# Patient Record
Sex: FEMALE | Race: WHITE | NOT HISPANIC OR LATINO
[De-identification: names, ages, dates, MRNs, and addresses within clinical notes are randomized per-mention and may not be internally consistent; named-entity substitution may affect disease eponyms.]

---

## 2019-09-24 ENCOUNTER — APPOINTMENT (OUTPATIENT)
Dept: INTERNAL MEDICINE | Facility: CLINIC | Age: 34
End: 2019-09-24

## 2019-11-01 ENCOUNTER — INPATIENT (INPATIENT)
Facility: HOSPITAL | Age: 34
LOS: 5 days | Discharge: HOME HEALTH | DRG: 908 | End: 2019-11-07
Attending: COLON & RECTAL SURGERY | Admitting: COLON & RECTAL SURGERY
Payer: MEDICAID

## 2019-11-01 ENCOUNTER — APPOINTMENT (OUTPATIENT)
Dept: COLORECTAL SURGERY | Facility: CLINIC | Age: 34
End: 2019-11-01
Payer: MEDICAID

## 2019-11-01 ENCOUNTER — TRANSCRIPTION ENCOUNTER (OUTPATIENT)
Age: 34
End: 2019-11-01

## 2019-11-01 VITALS
HEIGHT: 63 IN | SYSTOLIC BLOOD PRESSURE: 152 MMHG | DIASTOLIC BLOOD PRESSURE: 98 MMHG | OXYGEN SATURATION: 98 % | TEMPERATURE: 98 F | WEIGHT: 266.1 LBS | HEART RATE: 93 BPM | RESPIRATION RATE: 16 BRPM

## 2019-11-01 VITALS
DIASTOLIC BLOOD PRESSURE: 85 MMHG | BODY MASS INDEX: 48.02 KG/M2 | WEIGHT: 271 LBS | TEMPERATURE: 98 F | HEIGHT: 63 IN | HEART RATE: 94 BPM | SYSTOLIC BLOOD PRESSURE: 130 MMHG

## 2019-11-01 DIAGNOSIS — Z98.890 OTHER SPECIFIED POSTPROCEDURAL STATES: Chronic | ICD-10-CM

## 2019-11-01 DIAGNOSIS — F17.210 NICOTINE DEPENDENCE, CIGARETTES, UNCOMPLICATED: ICD-10-CM

## 2019-11-01 PROCEDURE — 99285 EMERGENCY DEPT VISIT HI MDM: CPT

## 2019-11-01 PROCEDURE — 99205 OFFICE O/P NEW HI 60 MIN: CPT

## 2019-11-01 PROCEDURE — 71045 X-RAY EXAM CHEST 1 VIEW: CPT | Mod: 26

## 2019-11-01 PROCEDURE — 93010 ELECTROCARDIOGRAM REPORT: CPT

## 2019-11-01 RX ORDER — PIPERACILLIN AND TAZOBACTAM 4; .5 G/20ML; G/20ML
3.38 INJECTION, POWDER, LYOPHILIZED, FOR SOLUTION INTRAVENOUS EVERY 6 HOURS
Refills: 0 | Status: DISCONTINUED | OUTPATIENT
Start: 2019-11-01 | End: 2019-11-05

## 2019-11-01 RX ORDER — PIPERACILLIN AND TAZOBACTAM 4; .5 G/20ML; G/20ML
3.38 INJECTION, POWDER, LYOPHILIZED, FOR SOLUTION INTRAVENOUS ONCE
Refills: 0 | Status: COMPLETED | OUTPATIENT
Start: 2019-11-01 | End: 2019-11-01

## 2019-11-01 RX ORDER — IBUPROFEN 200 MG
600 TABLET ORAL EVERY 6 HOURS
Refills: 0 | Status: DISCONTINUED | OUTPATIENT
Start: 2019-11-01 | End: 2019-11-05

## 2019-11-01 RX ORDER — ACETAMINOPHEN 500 MG
650 TABLET ORAL EVERY 6 HOURS
Refills: 0 | Status: DISCONTINUED | OUTPATIENT
Start: 2019-11-01 | End: 2019-11-03

## 2019-11-01 RX ORDER — IBUPROFEN 200 MG
800 TABLET ORAL DAILY
Refills: 0 | Status: DISCONTINUED | OUTPATIENT
Start: 2019-11-01 | End: 2019-11-01

## 2019-11-01 RX ORDER — SODIUM CHLORIDE 9 MG/ML
1000 INJECTION INTRAMUSCULAR; INTRAVENOUS; SUBCUTANEOUS
Refills: 0 | Status: DISCONTINUED | OUTPATIENT
Start: 2019-11-01 | End: 2019-11-05

## 2019-11-01 RX ORDER — ONDANSETRON 8 MG/1
4 TABLET, FILM COATED ORAL EVERY 6 HOURS
Refills: 0 | Status: DISCONTINUED | OUTPATIENT
Start: 2019-11-01 | End: 2019-11-05

## 2019-11-01 RX ORDER — DIPHENHYDRAMINE HCL 50 MG
25 CAPSULE ORAL EVERY 6 HOURS
Refills: 0 | Status: DISCONTINUED | OUTPATIENT
Start: 2019-11-01 | End: 2019-11-05

## 2019-11-01 RX ORDER — HEPARIN SODIUM 5000 [USP'U]/ML
5000 INJECTION INTRAVENOUS; SUBCUTANEOUS EVERY 8 HOURS
Refills: 0 | Status: DISCONTINUED | OUTPATIENT
Start: 2019-11-01 | End: 2019-11-04

## 2019-11-01 RX ORDER — INFLUENZA VIRUS VACCINE 15; 15; 15; 15 UG/.5ML; UG/.5ML; UG/.5ML; UG/.5ML
0.5 SUSPENSION INTRAMUSCULAR ONCE
Refills: 0 | Status: DISCONTINUED | OUTPATIENT
Start: 2019-11-01 | End: 2019-11-07

## 2019-11-01 RX ORDER — ALPRAZOLAM 0.25 MG
0.5 TABLET ORAL
Refills: 0 | Status: DISCONTINUED | OUTPATIENT
Start: 2019-11-01 | End: 2019-11-05

## 2019-11-01 RX ADMIN — Medication 0.5 MILLIGRAM(S): at 21:25

## 2019-11-01 RX ADMIN — SODIUM CHLORIDE 160 MILLILITER(S): 9 INJECTION INTRAMUSCULAR; INTRAVENOUS; SUBCUTANEOUS at 21:07

## 2019-11-01 RX ADMIN — HEPARIN SODIUM 5000 UNIT(S): 5000 INJECTION INTRAVENOUS; SUBCUTANEOUS at 23:44

## 2019-11-01 RX ADMIN — PIPERACILLIN AND TAZOBACTAM 200 GRAM(S): 4; .5 INJECTION, POWDER, LYOPHILIZED, FOR SOLUTION INTRAVENOUS at 21:06

## 2019-11-01 RX ADMIN — Medication 600 MILLIGRAM(S): at 21:26

## 2019-11-01 NOTE — REASON FOR VISIT
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

## 2019-11-01 NOTE — H&P ADULT - NSHPPHYSICALEXAM_GEN_ALL_CORE
Physical Exam:  General: NAD  Pulmonary: Nonlabored breathing, no respiratory distress  Abdominal: Soft, NT/ND, obese, 5cm horizontal incision beneath pannus with feculent output and surrounding erythema/edema  Neuro: AAO x3, no focal deficits

## 2019-11-01 NOTE — PROGRESS NOTE ADULT - SUBJECTIVE AND OBJECTIVE BOX
See my AllScripts full note from this afternoon.  32 yo woman with complex pelvic cyst that was removed by GYN surgeon in June 2019. Developed Pffansnstiehl infection and then colocutaneous fistula (rectosigmoid colon) about 6 weeks after surgery. Patient is morbidly obese.  Has has a pelvic and lower abdominal phlegmon and complex abscess all summer.  IR drainage attempts have failed.  Fistulograms and gastrograffin trans anal studies and CT scans have documented comlex abscess and ? connection to bladder.  No SB or other colonic fistulas noted.  Minimal fistula output, as per pateint, until last few days when stool was noted coming via opening.  In office today, large amount of semiformed stool drained.  No UTI recently but had one initially postop. Pneumaturia x 1-2 times only over last months.    PMH:  smoker  obese    Meds  Ibuprofen  (no antibiotics x 3 weeks    PSH  Above GYN surgery (no ESTEPHANIA or BSO done to our knowledge.  Op report not available.  Path as per patient was benign. )    Afeb, VSS in office  Vital Signs Last 24 Hrs  T(C): 36.7 (01 Nov 2019 16:53), Max: 36.7 (01 Nov 2019 16:53)  T(F): 98.1 (01 Nov 2019 16:53), Max: 98.1 (01 Nov 2019 16:53)  HR: 93 (01 Nov 2019 16:53) (93 - 93)  BP: 152/98 (01 Nov 2019 16:53) (152/98 - 152/98)  BP(mean): --  RR: 16 (01 Nov 2019 16:53) (16 - 16)  SpO2: 98% (01 Nov 2019 16:53) (98% - 98%)    lungs clear  cor  S1S2  abd: obese, non tender, large pannus.  Fistula and open transverse wound at base of pannus close to pubic symphisis  recta: not performed  ext: distal pulses intact                          12.0   16.50 )-----------( 265      ( 01 Nov 2019 17:24 )             38.7   11-01    138  |  102  |  17  ----------------------------<  98  SEE NOTE   |  24  |  0.61    Ca    9.2      01 Nov 2019 17:23    TPro  8.8<H>  /  Alb  3.9  /  TBili  0.2  /  DBili  x   /  AST  SEE NOTE  /  ALT  SEE NOTE  /  AlkPhos  90  11-01

## 2019-11-01 NOTE — PHYSICAL EXAM
[Exam Deferred] : exam was deferred [Normal Breath Sounds] : Normal breath sounds [Normal Heart Sounds] : normal heart sounds [No Rash or Lesion] : No rash or lesion [Alert] : alert [Oriented to Person] : oriented to person [Oriented to Place] : oriented to place [Oriented to Time] : oriented to time [Depressed] : depressed [Stool Sample Taken] : no stool obtained on rectal exam [JVD] : no jugular venous distention  [Thyroid] : the thyroid was abnormal [Carotid Bruits] : no carotid bruits [de-identified] : low pffanenstiehl incision that is open x 10 cm.  Stool exiting from the right side of it.. Very obese. non tender upper abdomen from above the suprapubic area.   [de-identified] : NAD

## 2019-11-01 NOTE — PROGRESS NOTE ADULT - ASSESSMENT
A/P  Complex pelvic phelgmon and colocutaneous fistula with increasing stool output via wound.  Last CT (has had 6-7 over summer) was in early October and showed phlegmon.  Abscessograms and enemas show complex infection and ? bladder connection (must be small or intermittent as she has no bladder sx's presenlty).  I feel that a slow cleanout over next 2-3 days with patient on clear liquids and IV hydration and antibiotcis followed by limited upper abdominal laparotomy and formation of loop tranverse colostomy is the best course of action.    After long period, then she will need to have definitive pelvic operation and takedown of fistula.  This should not be attempted now, in my opinion.   Admit, IV hydration  Golytely, 1 quart tomorrow, another Sunday and Monday  I/O's

## 2019-11-01 NOTE — ED ADULT NURSE NOTE - NSIMPLEMENTINTERV_GEN_ALL_ED
Implemented All Universal Safety Interventions:  [place] to call system. Call bell, personal items and telephone within reach. Instruct patient to call for assistance. Room bathroom lighting operational. Non-slip footwear when patient is off stretcher. Physically safe environment: no spills, clutter or unnecessary equipment. Stretcher in lowest position, wheels locked, appropriate side rails in place.

## 2019-11-01 NOTE — ED ADULT TRIAGE NOTE - CHIEF COMPLAINT QUOTE
pt sent by MD Saeed for colostomy on tuesday; pt with Hx of incisional dehiscence and fistula s/p ovarian cyst removal; denies f/c, abd pain at this time

## 2019-11-01 NOTE — H&P ADULT - NSHPLABSRESULTS_GEN_ALL_CORE
Vital Signs:  Vital Signs Last 24 Hrs  T(C): 36.7 (01 Nov 2019 16:53), Max: 36.7 (01 Nov 2019 16:53)  T(F): 98.1 (01 Nov 2019 16:53), Max: 98.1 (01 Nov 2019 16:53)  HR: 93 (01 Nov 2019 16:53) (93 - 93)  BP: 152/98 (01 Nov 2019 16:53) (152/98 - 152/98)  BP(mean): --  RR: 16 (01 Nov 2019 16:53) (16 - 16)  SpO2: 98% (01 Nov 2019 16:53) (98% - 98%)      Labs:                        12.0   16.50 )-----------( 265      ( 01 Nov 2019 17:24 )             38.7     11-01    138  |  102  |  17  ----------------------------<  98  SEE NOTE   |  24  |  0.61    Ca    9.2      01 Nov 2019 17:23    TPro  8.8<H>  /  Alb  3.9  /  TBili  0.2  /  DBili  x   /  AST  SEE NOTE  /  ALT  SEE NOTE  /  AlkPhos  90  11-01    PT/INR - ( 01 Nov 2019 17:24 )   PT: 13.0 sec;   INR: 1.15          PTT - ( 01 Nov 2019 17:24 )  PTT:34.1 sec    CAPILLARY BLOOD GLUCOSE        LIVER FUNCTIONS - ( 01 Nov 2019 17:23 )  Alb: 3.9 g/dL / Pro: 8.8 g/dL / ALK PHOS: 90 U/L / ALT: SEE NOTE U/L / AST: SEE NOTE U/L / GGT: x

## 2019-11-01 NOTE — ED PROVIDER NOTE - OBJECTIVE STATEMENT
32 yo F, PMH of sciatica, s/p multiple left ovarian cystectomy surgery by obgyn in June 2019 sec to complex cysts complicated by wound dehiscence and drainage with infection and abx at home via PICC line and frequent ED visits and hospitalizations since then with fistulogram done approx 3 weeks ago which showed colon and abd wall fistula presents from Dr. Saeed's office (colorectal surgery) for pre-op and admission to Dr. Saeed for surgery and colostomy placement. Pt admits to purulent dc from abd incision site- today at doctor's office she had fecal matter and food particles from her surgical site. No fevers, chills, abd pain. urinary sxs. No complaints.

## 2019-11-01 NOTE — ED PROVIDER NOTE - CLINICAL SUMMARY MEDICAL DECISION MAKING FREE TEXT BOX
34 yo F, PMH of sciatica, s/p multiple left ovarian cystectomy surgery by obgyn in June 2019 sec to complex cysts complicated by wound dehiscence and drainage with infection and abx at home via PICC line and frequent ED visits and hospitalizations since then with fistulogram done approx 3 weeks ago which showed colon and abd wall fistula presents from Dr. Saeed's office (colorectal surgery) for pre-op and admission to Dr. Saeed for surgery and colostomy placement. Pt admits to purulent dc from abd incision site- today at doctor's office she had fecal matter and food particles from her surgical site. No fevers, chills, abd pain. urinary sxs. No complaints.

## 2019-11-01 NOTE — ED ADULT NURSE NOTE - CHPI ED NUR SYMPTOMS NEG
no dizziness/no fever/no vomiting/no tingling/no weakness/no nausea/no decreased eating/drinking
none

## 2019-11-01 NOTE — ASSESSMENT
[FreeTextEntry1] : Morbidly obese young woman with colocutaneous fistula.\par Early October 2019 CT scan seen. Has complex phlegmon in pelvis and colocutaneous fistula after having a complex L adnexal cyst excised via a low transverse incision (no ESTEPHANIA done).  Multipe IR attempts to place drain - all were unsucessful.  Today, in office, for the first time it started putting out actual stool.  She has been off antibiotics x 3 weeks after having been on and off them all summer.  No fever with stable VS today.  Pathology from GYN op (original op) was peritoneal cyst and ovarian cyst.  No malignancy.  \par Benign middle and upper abdominal exam. \par Advised admission emergently and then IV hydration and slow bowel cleanout and then transverse colostomy via open surgery.  Clears only. \par To ER\par Residents aware of patient. \par NPO minus sips water. \par IV antbioitcs.  \par Long term plan would be to wait at least 6 months after diversion and then consider laparotomy and takedown of colocutaneous fistula.

## 2019-11-01 NOTE — HISTORY OF PRESENT ILLNESS
[FreeTextEntry1] : 33 year old female who developed low pelvic pain in February 2019,  she had pain and fever then. . . Trans vaginal sono found large comple  left adenexal mass. sono identified a large septated adenexal mass.  CT scan was done in March which questions a  left tubo ovarian abscess with a questioned closed loop sbo. (she had no  nausea or vomitng and was having bowel movements.) She was hospitalized and treated with IV antibiotics. IR drainage was attempted april 1st, drain was left in, but didn't drain.  she remained on antibiotics,  Repeat  CT 4/16/19 shows abscess minimally smaller with multiple  interloop abscesses, CT on 4/17 questioned multiple  abscesses with probable fistula to  sigmoid colon . Throughout all has been having bowel movements. was kept on antibiotics from February thru October.  In June they went in and removed  pelvi cyst. path showed benign peritoneal and ovarian cyst.  After op, she  did well for a while , but after 6 weeks she developed what she thought was an abscess and spontaneously drained.  she returned weekly for wound checks.  After a month she returned to the OR for a wound debridement.  Discharged with [facility]. After third dressing change she started draining  pus, and was put on antibiotics.  wound cleaned up and was discharged on dressing changes. Shortly thereafter she  developed a fistula.  Went to IR, was ultimately discharged on antibiotics again. 10/4/19  CT identified multiple abscesses, "Absessogram" finds large complex fistulous network. does not pass stool, but doesn't pass food depending on what she eats.  Is still having normal bms' once or twice a day.   Was to hav esurgery after most recent ED admission but discovered the general surgeon doesn't take her insurance. \par \par Christophe note: 35 yo woman with hx of 9.1 cm complex left adnexal pelvic mass/cyst (imaged 2/8/19, 3/21/19).  The had ? tuboovarian abscess (A/F levels) noted on 3/31/19.  IR did percutaneous drainage 4/1/19 of L hemipelvic collection.  N 4/16 had 11 x 7.9 x 9,7 cm abscess [fatty liver also noted] and interloop abscesses.  Abscess got bigger still.  Had open surgery in June by GYN team and had the abscess and cyst removed.  Initial post op course ok.  Developed wound infection weeks out.  Then open wound. Then wound debridement and then colocutaneous fistula.  Had colonic imaging that showed rectosigmoid fistula.  In and out of hospital.  Low output fistula.  Has persistent abdominal abscesses on f/u CT scans. Been mostly on antibiotics all summer.  CT abscessogram was done 10/7/19 and showed no fistula to SB or colon. Rectal contrast stiudy showed a complex fistulous communication between rectosigmoid colon (probably) and abdominal wall and also linear tracts extending to small collection in the pelvis.and ? communication to the bladder.  Attempts to cannulate the fistula from the skin level were not successful.\par Sees ID MD for infections.  Had IR attempt (?) drainage.  No drains currently. \par Has 1-2 BM’s per day.\par \par

## 2019-11-01 NOTE — H&P ADULT - HISTORY OF PRESENT ILLNESS
33F PMH possible L tuboovarian abscess s/p IR drain 04/01/2019 c/b multiple abscesses and sigmoid colon fistula treated with chronic antibiotics s/p ovarian cyst removal 06/2019 c/b abscess s/p operative debridement c/b multiple abscesses and fistulae presents with colocutaneous fistula. She had a 9.1cm complex L adnexal pelvic mass/cyst (imaged 2/8/2019 and 3/21/2019). She had a questionable tuboovarian abscess noted on 3/31/2019. IR performed a percutaneous drainage of the left hemipelvic collection on 4/1/2019. This was complicated by an abscess that grew.  In 06/2019, gynecology performed an ovarian cystectomy and abscess drainage. This was complicated by a wound infection about 6 weeks postoperatively, that was debrided and then complicated by a colocutaneous fistula. The rectosigmoid fistula is low output with persistent abdominal abscesses on CT imaging. A CT abscessogram on 10/7/2019 showed no fistula between the small bowel or colon, but rectal contrast study showed a complex fistulous communication between the rectosigmoid colon and abdominal wall with tracking to a small collection in the pelvis and possible bladder. Attempts to cannulate the fistula from the skin level were not successful. The patient currently does not have any drains and is having 1-2 bowel movements per day. 33F PMH possible L tuboovarian abscess s/p IR drain 04/01/2019 c/b multiple abscesses and sigmoid colon fistula treated with chronic antibiotics s/p ovarian cyst removal 06/2019 c/b abscess s/p operative debridement c/b multiple abscesses and fistulae presents with colocutaneous fistula. She had a 9.1cm complex L adnexal pelvic mass/cyst (imaged 2/8/2019 and 3/21/2019). She had a questionable tuboovarian abscess noted on 3/31/2019. IR performed a percutaneous drainage of the left hemipelvic collection on 4/1/2019. This was complicated by an abscess that grew.  In 06/2019, gynecology performed an ovarian cystectomy and abscess drainage. This was complicated by a wound infection about 6 weeks postoperatively, that was debrided and then complicated by a colocutaneous fistula. The rectosigmoid fistula is low output with persistent abdominal abscesses on CT imaging. A CT abscessogram on 10/7/2019 showed no fistula between the small bowel or colon, but rectal contrast study showed a complex fistulous communication between the rectosigmoid colon and abdominal wall with tracking to a small collection in the pelvis and possible bladder. Attempts to cannulate the fistula from the skin level were not successful. The patient currently does not have any drains. She has been able to tolerate PO and is having 1-2 bowel movements per day without nausea/vomiting. Her wound drainage has been purulent, but was feculent today. She endorses urinary frequency, but denies other LUTS.

## 2019-11-01 NOTE — ED ADULT NURSE NOTE - OBJECTIVE STATEMENT
33 year old F patient, A+OX3, ambulatory w steady gait, presents to ED with c/o of "my surgeon sent me in for a colostomy due to my fistula".  Pt had ovarian cyst removal surgery in June and has been having complications since.  No distress noted.  Breathing easily and unlabored, denies chest pain.  Has been having BM with no difficulty.  C/o of mild abd pain.

## 2019-11-01 NOTE — H&P ADULT - ASSESSMENT
A/P: 33F PMH possible L tuboovarian abscess s/p IR drain 04/01/2019 c/b multiple abscesses and sigmoid colon fistula treated with chronic antibiotics s/p ovarian cyst removal 06/2019 c/b abscess s/p operative debridement c/b multiple abscesses and fistulae presents with colocutaneous fistula    - Admit to regional bed under Dr. Saeed  - Pain/nausea PRN  - Sips/chips and IVF  - Zosyn  - OOBA/SCDs/SQH  - Tentative plan for diverting colostomy 11/05/2019  - AM labs  - Case discussed with chief resident

## 2019-11-02 LAB
ANION GAP SERPL CALC-SCNC: 9 MMOL/L — SIGNIFICANT CHANGE UP (ref 5–17)
BUN SERPL-MCNC: 12 MG/DL — SIGNIFICANT CHANGE UP (ref 7–23)
CALCIUM SERPL-MCNC: 8.7 MG/DL — SIGNIFICANT CHANGE UP (ref 8.4–10.5)
CHLORIDE SERPL-SCNC: 103 MMOL/L — SIGNIFICANT CHANGE UP (ref 96–108)
CO2 SERPL-SCNC: 27 MMOL/L — SIGNIFICANT CHANGE UP (ref 22–31)
CREAT SERPL-MCNC: 0.69 MG/DL — SIGNIFICANT CHANGE UP (ref 0.5–1.3)
GLUCOSE SERPL-MCNC: 102 MG/DL — HIGH (ref 70–99)
HCT VFR BLD CALC: 36.5 % — SIGNIFICANT CHANGE UP (ref 34.5–45)
HGB BLD-MCNC: 10.7 G/DL — LOW (ref 11.5–15.5)
MAGNESIUM SERPL-MCNC: 1.8 MG/DL — SIGNIFICANT CHANGE UP (ref 1.6–2.6)
MCHC RBC-ENTMCNC: 23.5 PG — LOW (ref 27–34)
MCHC RBC-ENTMCNC: 29.3 GM/DL — LOW (ref 32–36)
MCV RBC AUTO: 80 FL — SIGNIFICANT CHANGE UP (ref 80–100)
NRBC # BLD: 0 /100 WBCS — SIGNIFICANT CHANGE UP (ref 0–0)
PHOSPHATE SERPL-MCNC: 4.3 MG/DL — SIGNIFICANT CHANGE UP (ref 2.5–4.5)
PLATELET # BLD AUTO: 230 K/UL — SIGNIFICANT CHANGE UP (ref 150–400)
POTASSIUM SERPL-MCNC: 4 MMOL/L — SIGNIFICANT CHANGE UP (ref 3.5–5.3)
POTASSIUM SERPL-SCNC: 4 MMOL/L — SIGNIFICANT CHANGE UP (ref 3.5–5.3)
RBC # BLD: 4.56 M/UL — SIGNIFICANT CHANGE UP (ref 3.8–5.2)
RBC # FLD: 15.7 % — HIGH (ref 10.3–14.5)
SODIUM SERPL-SCNC: 139 MMOL/L — SIGNIFICANT CHANGE UP (ref 135–145)
WBC # BLD: 7.94 K/UL — SIGNIFICANT CHANGE UP (ref 3.8–10.5)
WBC # FLD AUTO: 7.94 K/UL — SIGNIFICANT CHANGE UP (ref 3.8–10.5)

## 2019-11-02 PROCEDURE — 99024 POSTOP FOLLOW-UP VISIT: CPT

## 2019-11-02 RX ORDER — HYDROMORPHONE HYDROCHLORIDE 2 MG/ML
0.5 INJECTION INTRAMUSCULAR; INTRAVENOUS; SUBCUTANEOUS ONCE
Refills: 0 | Status: DISCONTINUED | OUTPATIENT
Start: 2019-11-02 | End: 2019-11-02

## 2019-11-02 RX ORDER — SOD SULF/SODIUM/NAHCO3/KCL/PEG
4000 SOLUTION, RECONSTITUTED, ORAL ORAL ONCE
Refills: 0 | Status: DISCONTINUED | OUTPATIENT
Start: 2019-11-02 | End: 2019-11-02

## 2019-11-02 RX ORDER — SOD SULF/SODIUM/NAHCO3/KCL/PEG
2000 SOLUTION, RECONSTITUTED, ORAL ORAL ONCE
Refills: 0 | Status: COMPLETED | OUTPATIENT
Start: 2019-11-03 | End: 2019-11-03

## 2019-11-02 RX ORDER — SOD SULF/SODIUM/NAHCO3/KCL/PEG
2000 SOLUTION, RECONSTITUTED, ORAL ORAL ONCE
Refills: 0 | Status: COMPLETED | OUTPATIENT
Start: 2019-11-02 | End: 2019-11-02

## 2019-11-02 RX ORDER — MAGNESIUM SULFATE 500 MG/ML
2 VIAL (ML) INJECTION ONCE
Refills: 0 | Status: COMPLETED | OUTPATIENT
Start: 2019-11-02 | End: 2019-11-02

## 2019-11-02 RX ADMIN — PIPERACILLIN AND TAZOBACTAM 200 GRAM(S): 4; .5 INJECTION, POWDER, LYOPHILIZED, FOR SOLUTION INTRAVENOUS at 13:44

## 2019-11-02 RX ADMIN — HYDROMORPHONE HYDROCHLORIDE 0.5 MILLIGRAM(S): 2 INJECTION INTRAMUSCULAR; INTRAVENOUS; SUBCUTANEOUS at 16:26

## 2019-11-02 RX ADMIN — Medication 0.5 MILLIGRAM(S): at 22:16

## 2019-11-02 RX ADMIN — HEPARIN SODIUM 5000 UNIT(S): 5000 INJECTION INTRAVENOUS; SUBCUTANEOUS at 13:43

## 2019-11-02 RX ADMIN — PIPERACILLIN AND TAZOBACTAM 200 GRAM(S): 4; .5 INJECTION, POWDER, LYOPHILIZED, FOR SOLUTION INTRAVENOUS at 08:30

## 2019-11-02 RX ADMIN — Medication 600 MILLIGRAM(S): at 15:29

## 2019-11-02 RX ADMIN — Medication 2000 MILLILITER(S): at 16:16

## 2019-11-02 RX ADMIN — PIPERACILLIN AND TAZOBACTAM 200 GRAM(S): 4; .5 INJECTION, POWDER, LYOPHILIZED, FOR SOLUTION INTRAVENOUS at 20:33

## 2019-11-02 RX ADMIN — Medication 0.5 MILLIGRAM(S): at 09:26

## 2019-11-02 RX ADMIN — Medication 50 GRAM(S): at 13:43

## 2019-11-02 RX ADMIN — HEPARIN SODIUM 5000 UNIT(S): 5000 INJECTION INTRAVENOUS; SUBCUTANEOUS at 06:51

## 2019-11-02 RX ADMIN — PIPERACILLIN AND TAZOBACTAM 200 GRAM(S): 4; .5 INJECTION, POWDER, LYOPHILIZED, FOR SOLUTION INTRAVENOUS at 02:33

## 2019-11-02 NOTE — PROGRESS NOTE ADULT - ASSESSMENT
33F PMH possible L tuboovarian abscess s/p IR drain 04/01/2019 c/b multiple abscesses and sigmoid colon fistula treated with chronic antibiotics s/p ovarian cyst removal 06/2019 c/b abscess s/p operative debridement c/b multiple abscesses and fistulae presents with colocutaneous fistula    - Pain/nausea PRN  - Sips/chips and IVF  - Zosyn  - Consult ostomy nurses for stoma markings  - BID dressing changes  - OOBA/SCDs/SQH  - Tentative plan for diverting colostomy 11/05/2019  - AM labs

## 2019-11-02 NOTE — PROGRESS NOTE ADULT - SUBJECTIVE AND OBJECTIVE BOX
HD 2  Patient admitted last PM.  On IV zosyn.  Been on water p/o only.  Has continued to pass some stool via the fistula (open Pffanenstiehl incision)  No abdominal pain but has sciatica related pain for which she takes ibuprofen  Has had a BM this AM. No N/V.    Ambulating in room.  voiding well and frequently.  NO pneumaturia.  Has passed some gas via the fistula.      Vital Signs Last 24 Hrs  T(C): 36.4 (02 Nov 2019 12:29), Max: 36.8 (01 Nov 2019 21:30)  T(F): 97.6 (02 Nov 2019 12:29), Max: 98.3 (01 Nov 2019 21:30)  HR: 68 (02 Nov 2019 12:29) (58 - 93)  BP: 119/69 (02 Nov 2019 12:29) (106/69 - 152/98)  BP(mean): --  RR: 17 (02 Nov 2019 12:29) (16 - 18)  SpO2: 99% (02 Nov 2019 12:29) (96% - 99%)    exam:  abd: obese, non tender, open wound as before, some stool on dressing which is being changed frequently  ext: without calf tenderness    UO adequate                          10.7   7.94  )-----------( 230      ( 02 Nov 2019 06:54 )             36.5   11-02    139  |  103  |  12  ----------------------------<  102<H>  4.0   |  27  |  0.69    Ca    8.7      02 Nov 2019 06:54  Phos  4.3     11-02  Mg     1.8     11-02    TPro  8.8<H>  /  Alb  3.9  /  TBili  0.2  /  DBili  x   /  AST  SEE NOTE  /  ALT  SEE NOTE  /  AlkPhos  90  11-01

## 2019-11-02 NOTE — PROGRESS NOTE ADULT - ASSESSMENT
A/P  Has colocutaneous fistula with increasing amounts of stool draining via fistula.  Also having BM's.  Most of stool exiting via anus, thankfully.  WBC down and remains afebrile.  Holding on repeat imaging since I dont think it will change plan and she is doing ok overall minus the stool drainage and open wound.  Will give 1 quart golytely po today and allow clear liquids.  Second quart prep tomorrow, 2 quarts on Monday.  Surgery (ex lap, limited, and formation of loop stoma) planned for Tuesday afternoon.    Urged to ambulate more in hallway.  Follow exam.

## 2019-11-02 NOTE — PROGRESS NOTE ADULT - SUBJECTIVE AND OBJECTIVE BOX
INTERVAL HPI/OVERNIGHT EVENTS: admitted for management of colocutaneous fistula    SUBJECTIVE:  pt seen at bedside, without complaints at this time. wound clean    MEDICATIONS  (STANDING):  heparin  Injectable 5000 Unit(s) SubCutaneous every 8 hours  influenza   Vaccine 0.5 milliLiter(s) IntraMuscular once  piperacillin/tazobactam IVPB.. 3.375 Gram(s) IV Intermittent every 6 hours  sodium chloride 0.9%. 1000 milliLiter(s) (160 mL/Hr) IV Continuous <Continuous>    MEDICATIONS  (PRN):  acetaminophen   Tablet .. 650 milliGRAM(s) Oral every 6 hours PRN Severe Pain (7 - 10)  ALPRAZolam 0.5 milliGRAM(s) Oral two times a day PRN Anxiety  diphenhydrAMINE   Injectable 25 milliGRAM(s) IV Push every 6 hours PRN Rash and/or Itching  ibuprofen  Tablet. 600 milliGRAM(s) Oral every 6 hours PRN Mild Pain (1 - 3)  ondansetron Injectable 4 milliGRAM(s) IV Push every 6 hours PRN Nausea      Vital Signs Last 24 Hrs  T(C): 36.4 (02 Nov 2019 05:42), Max: 36.8 (01 Nov 2019 21:30)  T(F): 97.5 (02 Nov 2019 05:42), Max: 98.3 (01 Nov 2019 21:30)  HR: 58 (02 Nov 2019 05:42) (58 - 93)  BP: 109/67 (02 Nov 2019 05:42) (109/67 - 152/98)  BP(mean): --  RR: 18 (02 Nov 2019 05:42) (16 - 18)  SpO2: 96% (02 Nov 2019 05:42) (96% - 98%)    PHYSICAL EXAM:      Constitutional: A&Ox3    Respiratory: non labored breathing, no respiratory distress    Cardiovascular: NSR, RRR    Gastrointestinal: soft, nontender, nondistended, wound at suprapubic area clean, without erythema or active discharge    Extremities: (-) edema                  I&O's Detail    01 Nov 2019 07:01  -  02 Nov 2019 07:00  --------------------------------------------------------  IN:    sodium chloride 0.9%.: 1120 mL    Solution: 100 mL  Total IN: 1220 mL    OUT:    Voided: 200 mL  Total OUT: 200 mL    Total NET: 1020 mL          LABS:                        10.7   7.94  )-----------( 230      ( 02 Nov 2019 06:54 )             36.5     11-02    139  |  103  |  12  ----------------------------<  102<H>  4.0   |  27  |  0.69    Ca    8.7      02 Nov 2019 06:54  Phos  4.3     11-02  Mg     1.8     11-02    TPro  8.8<H>  /  Alb  3.9  /  TBili  0.2  /  DBili  x   /  AST  SEE NOTE  /  ALT  SEE NOTE  /  AlkPhos  90  11-01    PT/INR - ( 01 Nov 2019 17:24 )   PT: 13.0 sec;   INR: 1.15          PTT - ( 01 Nov 2019 17:24 )  PTT:34.1 sec      RADIOLOGY & ADDITIONAL STUDIES:

## 2019-11-03 LAB
ANION GAP SERPL CALC-SCNC: 10 MMOL/L — SIGNIFICANT CHANGE UP (ref 5–17)
BUN SERPL-MCNC: 4 MG/DL — LOW (ref 7–23)
CALCIUM SERPL-MCNC: 8.5 MG/DL — SIGNIFICANT CHANGE UP (ref 8.4–10.5)
CHLORIDE SERPL-SCNC: 106 MMOL/L — SIGNIFICANT CHANGE UP (ref 96–108)
CO2 SERPL-SCNC: 23 MMOL/L — SIGNIFICANT CHANGE UP (ref 22–31)
CREAT SERPL-MCNC: 0.57 MG/DL — SIGNIFICANT CHANGE UP (ref 0.5–1.3)
GLUCOSE SERPL-MCNC: 111 MG/DL — HIGH (ref 70–99)
HCT VFR BLD CALC: 32.7 % — LOW (ref 34.5–45)
HGB BLD-MCNC: 9.9 G/DL — LOW (ref 11.5–15.5)
MAGNESIUM SERPL-MCNC: 2 MG/DL — SIGNIFICANT CHANGE UP (ref 1.6–2.6)
MCHC RBC-ENTMCNC: 23.9 PG — LOW (ref 27–34)
MCHC RBC-ENTMCNC: 30.3 GM/DL — LOW (ref 32–36)
MCV RBC AUTO: 78.8 FL — LOW (ref 80–100)
NRBC # BLD: 0 /100 WBCS — SIGNIFICANT CHANGE UP (ref 0–0)
PHOSPHATE SERPL-MCNC: 3.1 MG/DL — SIGNIFICANT CHANGE UP (ref 2.5–4.5)
PLATELET # BLD AUTO: 196 K/UL — SIGNIFICANT CHANGE UP (ref 150–400)
POTASSIUM SERPL-MCNC: 3.6 MMOL/L — SIGNIFICANT CHANGE UP (ref 3.5–5.3)
POTASSIUM SERPL-SCNC: 3.6 MMOL/L — SIGNIFICANT CHANGE UP (ref 3.5–5.3)
RBC # BLD: 4.15 M/UL — SIGNIFICANT CHANGE UP (ref 3.8–5.2)
RBC # FLD: 15.6 % — HIGH (ref 10.3–14.5)
SODIUM SERPL-SCNC: 139 MMOL/L — SIGNIFICANT CHANGE UP (ref 135–145)
WBC # BLD: 5.98 K/UL — SIGNIFICANT CHANGE UP (ref 3.8–10.5)
WBC # FLD AUTO: 5.98 K/UL — SIGNIFICANT CHANGE UP (ref 3.8–10.5)

## 2019-11-03 PROCEDURE — 99024 POSTOP FOLLOW-UP VISIT: CPT

## 2019-11-03 RX ORDER — HYDROMORPHONE HYDROCHLORIDE 2 MG/ML
0.5 INJECTION INTRAMUSCULAR; INTRAVENOUS; SUBCUTANEOUS ONCE
Refills: 0 | Status: DISCONTINUED | OUTPATIENT
Start: 2019-11-03 | End: 2019-11-03

## 2019-11-03 RX ORDER — ACETAMINOPHEN 500 MG
650 TABLET ORAL EVERY 6 HOURS
Refills: 0 | Status: DISCONTINUED | OUTPATIENT
Start: 2019-11-03 | End: 2019-11-05

## 2019-11-03 RX ORDER — HYDROMORPHONE HYDROCHLORIDE 2 MG/ML
0.5 INJECTION INTRAMUSCULAR; INTRAVENOUS; SUBCUTANEOUS EVERY 6 HOURS
Refills: 0 | Status: DISCONTINUED | OUTPATIENT
Start: 2019-11-03 | End: 2019-11-05

## 2019-11-03 RX ORDER — HYDROMORPHONE HYDROCHLORIDE 2 MG/ML
1 INJECTION INTRAMUSCULAR; INTRAVENOUS; SUBCUTANEOUS ONCE
Refills: 0 | Status: DISCONTINUED | OUTPATIENT
Start: 2019-11-03 | End: 2019-11-03

## 2019-11-03 RX ORDER — POTASSIUM CHLORIDE 20 MEQ
40 PACKET (EA) ORAL ONCE
Refills: 0 | Status: COMPLETED | OUTPATIENT
Start: 2019-11-03 | End: 2019-11-03

## 2019-11-03 RX ADMIN — HYDROMORPHONE HYDROCHLORIDE 0.5 MILLIGRAM(S): 2 INJECTION INTRAMUSCULAR; INTRAVENOUS; SUBCUTANEOUS at 00:54

## 2019-11-03 RX ADMIN — HYDROMORPHONE HYDROCHLORIDE 0.5 MILLIGRAM(S): 2 INJECTION INTRAMUSCULAR; INTRAVENOUS; SUBCUTANEOUS at 09:37

## 2019-11-03 RX ADMIN — Medication 0.5 MILLIGRAM(S): at 07:07

## 2019-11-03 RX ADMIN — HEPARIN SODIUM 5000 UNIT(S): 5000 INJECTION INTRAVENOUS; SUBCUTANEOUS at 14:40

## 2019-11-03 RX ADMIN — HYDROMORPHONE HYDROCHLORIDE 0.5 MILLIGRAM(S): 2 INJECTION INTRAMUSCULAR; INTRAVENOUS; SUBCUTANEOUS at 00:24

## 2019-11-03 RX ADMIN — Medication 650 MILLIGRAM(S): at 17:53

## 2019-11-03 RX ADMIN — PIPERACILLIN AND TAZOBACTAM 200 GRAM(S): 4; .5 INJECTION, POWDER, LYOPHILIZED, FOR SOLUTION INTRAVENOUS at 01:02

## 2019-11-03 RX ADMIN — HYDROMORPHONE HYDROCHLORIDE 1 MILLIGRAM(S): 2 INJECTION INTRAMUSCULAR; INTRAVENOUS; SUBCUTANEOUS at 12:58

## 2019-11-03 RX ADMIN — Medication 40 MILLIEQUIVALENT(S): at 09:37

## 2019-11-03 RX ADMIN — Medication 650 MILLIGRAM(S): at 18:53

## 2019-11-03 RX ADMIN — HYDROMORPHONE HYDROCHLORIDE 1 MILLIGRAM(S): 2 INJECTION INTRAMUSCULAR; INTRAVENOUS; SUBCUTANEOUS at 12:43

## 2019-11-03 RX ADMIN — PIPERACILLIN AND TAZOBACTAM 200 GRAM(S): 4; .5 INJECTION, POWDER, LYOPHILIZED, FOR SOLUTION INTRAVENOUS at 14:40

## 2019-11-03 RX ADMIN — HYDROMORPHONE HYDROCHLORIDE 0.5 MILLIGRAM(S): 2 INJECTION INTRAMUSCULAR; INTRAVENOUS; SUBCUTANEOUS at 09:52

## 2019-11-03 RX ADMIN — PIPERACILLIN AND TAZOBACTAM 200 GRAM(S): 4; .5 INJECTION, POWDER, LYOPHILIZED, FOR SOLUTION INTRAVENOUS at 20:44

## 2019-11-03 RX ADMIN — PIPERACILLIN AND TAZOBACTAM 200 GRAM(S): 4; .5 INJECTION, POWDER, LYOPHILIZED, FOR SOLUTION INTRAVENOUS at 07:00

## 2019-11-03 RX ADMIN — Medication 2000 MILLILITER(S): at 09:38

## 2019-11-03 RX ADMIN — Medication 0.5 MILLIGRAM(S): at 20:43

## 2019-11-03 NOTE — PROGRESS NOTE ADULT - ASSESSMENT
33F PMH possible L tuboovarian abscess s/p IR drain 04/01/2019 c/b multiple abscesses and sigmoid colon fistula treated with chronic antibiotics s/p ovarian cyst removal 06/2019 c/b abscess s/p operative debridement c/b multiple abscesses and fistulae presents with colocutaneous fistula    - Pain/nausea PRN  - Sips/chips and IVF  - Zosyn  - Consult ostomy nurses for stoma markings  - BID dressing changes  - OOBA/SCDs/SQH  - Tentative plan for diverting colostomy 11/05/2019  - AM labs  - Continue with bowel prep for OR on 11/05

## 2019-11-03 NOTE — PROGRESS NOTE ADULT - SUBJECTIVE AND OBJECTIVE BOX
HD 3  Taking the bowel prep and having BM's via anus and via fistula.  Consistency of stool now is thinner.  She c/o pain in pelvis and fistula when she strains to have a BM (she feels need to strain).  For this pain the HO have been giving her dilaudid.  She came in on advil.  She is also on clears.  She is ambulating and voiding ok.    Vital Signs Last 24 Hrs  T(C): 36.9 (03 Nov 2019 13:02), Max: 37.1 (03 Nov 2019 09:00)  T(F): 98.4 (03 Nov 2019 13:02), Max: 98.7 (03 Nov 2019 09:00)  HR: 77 (03 Nov 2019 13:02) (68 - 77)  BP: 129/54 (03 Nov 2019 09:00) (115/78 - 131/77)  BP(mean): --  RR: 15 (03 Nov 2019 13:02) (15 - 18)  SpO2: 99% (03 Nov 2019 13:02) (97% - 99%)    abd: no change, no tenderness except near fistula beneath the panus.    ext: without calf tenderness                          9.9    5.98  )-----------( 196      ( 03 Nov 2019 07:22 )             32.7   11-03    139  |  106  |  4<L>  ----------------------------<  111<H>  3.6   |  23  |  0.57    Ca    8.5      03 Nov 2019 07:22  Phos  3.1     11-03  Mg     2.0     11-03    TPro  8.8<H>  /  Alb  3.9  /  TBili  0.2  /  DBili  x   /  AST  SEE NOTE  /  ALT  SEE NOTE  /  AlkPhos  90  11-01

## 2019-11-03 NOTE — PROGRESS NOTE ADULT - ASSESSMENT
A/P Doing ok.  Will give ibuprofen 600 mg q 6 hrs and then use 0.5 mg dialudid for break through pain.   Continue prep  Clear liquids  Ambulate  Watch K levels  Surgery planned for Tuesday late afternoon.

## 2019-11-03 NOTE — PROGRESS NOTE ADULT - SUBJECTIVE AND OBJECTIVE BOX
24 hr events:  O/N: pt drank 1/2 the golytely, dressing changed, AWA   11/2: dressing changed by patient, 1/2 golytely today and 1/2 tomorrow as per Dr. Saeed    SUBJECTIVE:  Pt seen and examined by chief resident. Pt is doing well. Clear liquid diet tolerated. Ambulating out of bed. Drinking prep. No nausea or vomiting. No complaints at this time.    Vital Signs Last 24 Hrs  T(C): 36.7 (03 Nov 2019 05:06), Max: 36.9 (02 Nov 2019 20:15)  T(F): 98 (03 Nov 2019 05:06), Max: 98.5 (02 Nov 2019 20:15)  HR: 70 (03 Nov 2019 05:06) (59 - 76)  BP: 115/78 (03 Nov 2019 05:06) (106/69 - 131/77)  RR: 17 (03 Nov 2019 05:06) (17 - 18)  SpO2: 99% (03 Nov 2019 05:06) (97% - 99%)    Physical Exam:  General: NAD  Pulmonary: Nonlabored breathing, no respiratory distress  Abdominal: soft, nontender, nondistended, wound at suprapubic with minimal active discharge  Extremities: warm, well perfused. SCDs in place.     I&O's Summary    02 Nov 2019 08:01  -  03 Nov 2019 07:00  --------------------------------------------------------  IN: 5820 mL / OUT: 2700 mL / NET: 3120 mL        LABS:                        9.9    5.98  )-----------( 196      ( 03 Nov 2019 07:22 )             32.7     11-02    139  |  103  |  12  ----------------------------<  102<H>  4.0   |  27  |  0.69    Ca    8.7      02 Nov 2019 06:54  Phos  4.3     11-02  Mg     1.8     11-02    TPro  8.8<H>  /  Alb  3.9  /  TBili  0.2  /  DBili  x   /  AST  SEE NOTE  /  ALT  SEE NOTE  /  AlkPhos  90  11-01  PT/INR - ( 01 Nov 2019 17:24 )   PT: 13.0 sec;   INR: 1.15     PTT - ( 01 Nov 2019 17:24 )  PTT:34.1 sec    LIVER FUNCTIONS - ( 01 Nov 2019 17:23 )  Alb: 3.9 g/dL / Pro: 8.8 g/dL / ALK PHOS: 90 U/L / ALT: SEE NOTE U/L / AST: SEE NOTE U/L / GGT: x

## 2019-11-04 PROBLEM — N83.209 UNSPECIFIED OVARIAN CYST, UNSPECIFIED SIDE: Chronic | Status: ACTIVE | Noted: 2019-11-01

## 2019-11-04 PROBLEM — N70.93 SALPINGITIS AND OOPHORITIS, UNSPECIFIED: Chronic | Status: ACTIVE | Noted: 2019-11-01

## 2019-11-04 LAB
ANION GAP SERPL CALC-SCNC: 10 MMOL/L — SIGNIFICANT CHANGE UP (ref 5–17)
BUN SERPL-MCNC: 2 MG/DL — LOW (ref 7–23)
CALCIUM SERPL-MCNC: 8.3 MG/DL — LOW (ref 8.4–10.5)
CHLORIDE SERPL-SCNC: 105 MMOL/L — SIGNIFICANT CHANGE UP (ref 96–108)
CO2 SERPL-SCNC: 25 MMOL/L — SIGNIFICANT CHANGE UP (ref 22–31)
CREAT SERPL-MCNC: 0.58 MG/DL — SIGNIFICANT CHANGE UP (ref 0.5–1.3)
GLUCOSE SERPL-MCNC: 88 MG/DL — SIGNIFICANT CHANGE UP (ref 70–99)
HCT VFR BLD CALC: 31.9 % — LOW (ref 34.5–45)
HGB BLD-MCNC: 9.4 G/DL — LOW (ref 11.5–15.5)
MAGNESIUM SERPL-MCNC: 1.9 MG/DL — SIGNIFICANT CHANGE UP (ref 1.6–2.6)
MCHC RBC-ENTMCNC: 23.5 PG — LOW (ref 27–34)
MCHC RBC-ENTMCNC: 29.5 GM/DL — LOW (ref 32–36)
MCV RBC AUTO: 79.8 FL — LOW (ref 80–100)
NRBC # BLD: 0 /100 WBCS — SIGNIFICANT CHANGE UP (ref 0–0)
PHOSPHATE SERPL-MCNC: 3.8 MG/DL — SIGNIFICANT CHANGE UP (ref 2.5–4.5)
PLATELET # BLD AUTO: 192 K/UL — SIGNIFICANT CHANGE UP (ref 150–400)
POTASSIUM SERPL-MCNC: 3.8 MMOL/L — SIGNIFICANT CHANGE UP (ref 3.5–5.3)
POTASSIUM SERPL-SCNC: 3.8 MMOL/L — SIGNIFICANT CHANGE UP (ref 3.5–5.3)
RBC # BLD: 4 M/UL — SIGNIFICANT CHANGE UP (ref 3.8–5.2)
RBC # FLD: 15.4 % — HIGH (ref 10.3–14.5)
SODIUM SERPL-SCNC: 140 MMOL/L — SIGNIFICANT CHANGE UP (ref 135–145)
WBC # BLD: 5.4 K/UL — SIGNIFICANT CHANGE UP (ref 3.8–10.5)
WBC # FLD AUTO: 5.4 K/UL — SIGNIFICANT CHANGE UP (ref 3.8–10.5)

## 2019-11-04 PROCEDURE — 93010 ELECTROCARDIOGRAM REPORT: CPT

## 2019-11-04 PROCEDURE — 71045 X-RAY EXAM CHEST 1 VIEW: CPT | Mod: 26

## 2019-11-04 RX ORDER — METRONIDAZOLE 500 MG
500 TABLET ORAL ONCE
Refills: 0 | Status: COMPLETED | OUTPATIENT
Start: 2019-11-04 | End: 2019-11-04

## 2019-11-04 RX ORDER — POTASSIUM CHLORIDE 20 MEQ
20 PACKET (EA) ORAL ONCE
Refills: 0 | Status: COMPLETED | OUTPATIENT
Start: 2019-11-04 | End: 2019-11-04

## 2019-11-04 RX ORDER — NEOMYCIN SULFATE 500 MG/1
1000 TABLET ORAL ONCE
Refills: 0 | Status: COMPLETED | OUTPATIENT
Start: 2019-11-04 | End: 2019-11-04

## 2019-11-04 RX ORDER — ENOXAPARIN SODIUM 100 MG/ML
40 INJECTION SUBCUTANEOUS EVERY 12 HOURS
Refills: 0 | Status: DISCONTINUED | OUTPATIENT
Start: 2019-11-04 | End: 2019-11-05

## 2019-11-04 RX ORDER — SOD SULF/SODIUM/NAHCO3/KCL/PEG
4000 SOLUTION, RECONSTITUTED, ORAL ORAL ONCE
Refills: 0 | Status: COMPLETED | OUTPATIENT
Start: 2019-11-04 | End: 2019-11-04

## 2019-11-04 RX ADMIN — Medication 600 MILLIGRAM(S): at 19:29

## 2019-11-04 RX ADMIN — Medication 600 MILLIGRAM(S): at 03:06

## 2019-11-04 RX ADMIN — Medication 500 MILLIGRAM(S): at 15:14

## 2019-11-04 RX ADMIN — NEOMYCIN SULFATE 1000 MILLIGRAM(S): 500 TABLET ORAL at 21:27

## 2019-11-04 RX ADMIN — NEOMYCIN SULFATE 1000 MILLIGRAM(S): 500 TABLET ORAL at 15:14

## 2019-11-04 RX ADMIN — PIPERACILLIN AND TAZOBACTAM 200 GRAM(S): 4; .5 INJECTION, POWDER, LYOPHILIZED, FOR SOLUTION INTRAVENOUS at 07:00

## 2019-11-04 RX ADMIN — Medication 600 MILLIGRAM(S): at 10:42

## 2019-11-04 RX ADMIN — Medication 0.5 MILLIGRAM(S): at 07:37

## 2019-11-04 RX ADMIN — PIPERACILLIN AND TAZOBACTAM 200 GRAM(S): 4; .5 INJECTION, POWDER, LYOPHILIZED, FOR SOLUTION INTRAVENOUS at 02:07

## 2019-11-04 RX ADMIN — Medication 500 MILLIGRAM(S): at 21:27

## 2019-11-04 RX ADMIN — Medication 600 MILLIGRAM(S): at 09:52

## 2019-11-04 RX ADMIN — Medication 4000 MILLILITER(S): at 09:51

## 2019-11-04 RX ADMIN — Medication 500 MILLIGRAM(S): at 13:41

## 2019-11-04 RX ADMIN — NEOMYCIN SULFATE 1000 MILLIGRAM(S): 500 TABLET ORAL at 13:42

## 2019-11-04 RX ADMIN — SODIUM CHLORIDE 160 MILLILITER(S): 9 INJECTION INTRAMUSCULAR; INTRAVENOUS; SUBCUTANEOUS at 09:51

## 2019-11-04 RX ADMIN — Medication 600 MILLIGRAM(S): at 02:06

## 2019-11-04 RX ADMIN — Medication 0.5 MILLIGRAM(S): at 19:29

## 2019-11-04 RX ADMIN — SODIUM CHLORIDE 160 MILLILITER(S): 9 INJECTION INTRAMUSCULAR; INTRAVENOUS; SUBCUTANEOUS at 19:29

## 2019-11-04 RX ADMIN — Medication 20 MILLIEQUIVALENT(S): at 09:08

## 2019-11-04 RX ADMIN — HEPARIN SODIUM 5000 UNIT(S): 5000 INJECTION INTRAVENOUS; SUBCUTANEOUS at 15:13

## 2019-11-04 RX ADMIN — PIPERACILLIN AND TAZOBACTAM 200 GRAM(S): 4; .5 INJECTION, POWDER, LYOPHILIZED, FOR SOLUTION INTRAVENOUS at 15:14

## 2019-11-04 RX ADMIN — ENOXAPARIN SODIUM 40 MILLIGRAM(S): 100 INJECTION SUBCUTANEOUS at 21:27

## 2019-11-04 RX ADMIN — PIPERACILLIN AND TAZOBACTAM 200 GRAM(S): 4; .5 INJECTION, POWDER, LYOPHILIZED, FOR SOLUTION INTRAVENOUS at 21:22

## 2019-11-04 NOTE — PROGRESS NOTE ADULT - SUBJECTIVE AND OBJECTIVE BOX
HD 4  Doing well overall.  Taking second gallon Golytely  Having looser BM's and more liquid fistula output.  Most fecal material passing via the anus.  Still having pain and cramps when has BM's but is straining less.  Ambulating  On clears  On same antibiotics     Vital Signs Last 24 Hrs  T(C): 36.2 (04 Nov 2019 13:03), Max: 36.9 (03 Nov 2019 17:51)  T(F): 97.2 (04 Nov 2019 13:03), Max: 98.5 (03 Nov 2019 17:51)  HR: 64 (04 Nov 2019 13:03) (50 - 66)  BP: 125/68 (04 Nov 2019 13:03) (112/68 - 139/80)  BP(mean): --  RR: 17 (04 Nov 2019 13:03) (17 - 18)  SpO2: 99% (04 Nov 2019 13:03) (97% - 99%)    abd: no change, not tender, obese, fistula with dressing in place, brown liquid drainage  no blood                          9.4    5.40  )-----------( 192      ( 04 Nov 2019 07:18 )             31.9   11-04    140  |  105  |  2<L>  ----------------------------<  88  3.8   |  25  |  0.58    Ca    8.3<L>      04 Nov 2019 07:18  Phos  3.8     11-04  Mg     1.9     11-04

## 2019-11-04 NOTE — PROGRESS NOTE ADULT - ASSESSMENT
To complete prep  IV hydration  NPO after midnight  To be marked for trasnverse loop stoma by Sharron the WOC in hospital  Continue antibiotics  Open transverse colectomy planned for tommorow PM.

## 2019-11-04 NOTE — PROGRESS NOTE ADULT - SUBJECTIVE AND OBJECTIVE BOX
INTERVAL HPI/OVERNIGHT EVENTS: NAEO. AFVSS.    SUBJECTIVE: Pt seen and examined at bedside this AM by surgery team. Reports changing dressings herself without difficulty. Pain well controlled, tolerating diet. Bowel prepped as directed. Will give golytely and abx bowel prep today. Denies f/n/v/cp/sob.    MEDICATIONS  (STANDING):  acetaminophen   Tablet .. 650 milliGRAM(s) Oral every 6 hours  heparin  Injectable 5000 Unit(s) SubCutaneous every 8 hours  influenza   Vaccine 0.5 milliLiter(s) IntraMuscular once  piperacillin/tazobactam IVPB.. 3.375 Gram(s) IV Intermittent every 6 hours  polyethylene glycol/electrolyte Solution. 4000 milliLiter(s) Oral once  potassium chloride    Tablet ER 20 milliEquivalent(s) Oral once  sodium chloride 0.9%. 1000 milliLiter(s) (160 mL/Hr) IV Continuous <Continuous>    MEDICATIONS  (PRN):  ALPRAZolam 0.5 milliGRAM(s) Oral two times a day PRN Anxiety  diphenhydrAMINE   Injectable 25 milliGRAM(s) IV Push every 6 hours PRN Rash and/or Itching  HYDROmorphone  Injectable 0.5 milliGRAM(s) IV Push every 6 hours PRN Severe Pain (7 - 10)  ibuprofen  Tablet. 600 milliGRAM(s) Oral every 6 hours PRN Mild Pain (1 - 3)  ondansetron Injectable 4 milliGRAM(s) IV Push every 6 hours PRN Nausea      Vital Signs Last 24 Hrs  T(C): 36.6 (04 Nov 2019 08:48), Max: 36.9 (03 Nov 2019 13:02)  T(F): 97.9 (04 Nov 2019 08:48), Max: 98.5 (03 Nov 2019 17:51)  HR: 62 (04 Nov 2019 08:48) (50 - 77)  BP: 131/75 (04 Nov 2019 08:48) (112/68 - 139/80)  BP(mean): --  RR: 18 (04 Nov 2019 08:48) (15 - 18)  SpO2: 98% (04 Nov 2019 08:48) (97% - 99%)    PHYSICAL EXAM:    Constitutional: A&Ox3, NAD     Respiratory: non labored breathing, no respiratory distress    Cardiovascular: NSR, RRR    Gastrointestinal: Abdomen soft and obese, nd, nt. Wound at suprapubic site, overlying ABD w/ active feculent drainage    Extremities: wwp, no calf tenderness or edema. SCDs in place.      I&O's Detail    03 Nov 2019 07:01  -  04 Nov 2019 07:00  --------------------------------------------------------  IN:    Oral Fluid: 820 mL    sodium chloride 0.9%.: 3680 mL    Solution: 200 mL  Total IN: 4700 mL    OUT:    Voided: 3000 mL  Total OUT: 3000 mL    Total NET: 1700 mL      04 Nov 2019 07:01  -  04 Nov 2019 09:09  --------------------------------------------------------  IN:    Oral Fluid: 340 mL  Total IN: 340 mL    OUT:  Total OUT: 0 mL    Total NET: 340 mL          LABS:                        9.4    5.40  )-----------( 192      ( 04 Nov 2019 07:18 )             31.9     11-04    140  |  105  |  2<L>  ----------------------------<  88  3.8   |  25  |  0.58    Ca    8.3<L>      04 Nov 2019 07:18  Phos  3.8     11-04  Mg     1.9     11-04            RADIOLOGY & ADDITIONAL STUDIES:

## 2019-11-04 NOTE — PROGRESS NOTE ADULT - ASSESSMENT
33F PMH possible L tuboovarian abscess s/p IR drain 04/01/2019 c/b multiple abscesses and sigmoid colon fistula treated with chronic antibiotics s/p ovarian cyst removal 06/2019 c/b abscess s/p operative debridement c/b multiple abscesses and fistulae presents with colocutaneous fistula    - Pain/nausea PRN  - CLD/IVF  - Zosyn  - Golytely/abx bowel prep today  - Consult ostomy nurses for stoma markings  - BID dressing changes  - OOBA/SCDs/SQH  - Plan for diverting colostomy 11/05/2019  - Pre op   - AM labs

## 2019-11-05 LAB
ANION GAP SERPL CALC-SCNC: 12 MMOL/L — SIGNIFICANT CHANGE UP (ref 5–17)
BLD GP AB SCN SERPL QL: NEGATIVE — SIGNIFICANT CHANGE UP
BUN SERPL-MCNC: 2 MG/DL — LOW (ref 7–23)
CALCIUM SERPL-MCNC: 8.4 MG/DL — SIGNIFICANT CHANGE UP (ref 8.4–10.5)
CHLORIDE SERPL-SCNC: 107 MMOL/L — SIGNIFICANT CHANGE UP (ref 96–108)
CO2 SERPL-SCNC: 23 MMOL/L — SIGNIFICANT CHANGE UP (ref 22–31)
CREAT SERPL-MCNC: 0.6 MG/DL — SIGNIFICANT CHANGE UP (ref 0.5–1.3)
GLUCOSE SERPL-MCNC: 90 MG/DL — SIGNIFICANT CHANGE UP (ref 70–99)
HCT VFR BLD CALC: 29.7 % — LOW (ref 34.5–45)
HGB BLD-MCNC: 9.2 G/DL — LOW (ref 11.5–15.5)
INR BLD: 1.31 — HIGH (ref 0.88–1.16)
MAGNESIUM SERPL-MCNC: 1.8 MG/DL — SIGNIFICANT CHANGE UP (ref 1.6–2.6)
MCHC RBC-ENTMCNC: 24.3 PG — LOW (ref 27–34)
MCHC RBC-ENTMCNC: 31 GM/DL — LOW (ref 32–36)
MCV RBC AUTO: 78.6 FL — LOW (ref 80–100)
NRBC # BLD: 0 /100 WBCS — SIGNIFICANT CHANGE UP (ref 0–0)
PHOSPHATE SERPL-MCNC: 3.9 MG/DL — SIGNIFICANT CHANGE UP (ref 2.5–4.5)
PLATELET # BLD AUTO: 176 K/UL — SIGNIFICANT CHANGE UP (ref 150–400)
POTASSIUM SERPL-MCNC: 3.7 MMOL/L — SIGNIFICANT CHANGE UP (ref 3.5–5.3)
POTASSIUM SERPL-SCNC: 3.7 MMOL/L — SIGNIFICANT CHANGE UP (ref 3.5–5.3)
PROTHROM AB SERPL-ACNC: 14.9 SEC — HIGH (ref 10–12.9)
RBC # BLD: 3.78 M/UL — LOW (ref 3.8–5.2)
RBC # FLD: 15.4 % — HIGH (ref 10.3–14.5)
RH IG SCN BLD-IMP: POSITIVE — SIGNIFICANT CHANGE UP
SODIUM SERPL-SCNC: 142 MMOL/L — SIGNIFICANT CHANGE UP (ref 135–145)
WBC # BLD: 4.13 K/UL — SIGNIFICANT CHANGE UP (ref 3.8–10.5)
WBC # FLD AUTO: 4.13 K/UL — SIGNIFICANT CHANGE UP (ref 3.8–10.5)

## 2019-11-05 PROCEDURE — 44320 COLOSTOMY: CPT

## 2019-11-05 RX ORDER — POTASSIUM CHLORIDE 20 MEQ
20 PACKET (EA) ORAL
Refills: 0 | Status: DISCONTINUED | OUTPATIENT
Start: 2019-11-05 | End: 2019-11-05

## 2019-11-05 RX ORDER — SODIUM CHLORIDE 9 MG/ML
1000 INJECTION, SOLUTION INTRAVENOUS
Refills: 0 | Status: DISCONTINUED | OUTPATIENT
Start: 2019-11-05 | End: 2019-11-05

## 2019-11-05 RX ORDER — ACETAMINOPHEN 500 MG
1000 TABLET ORAL ONCE
Refills: 0 | Status: COMPLETED | OUTPATIENT
Start: 2019-11-05 | End: 2019-11-05

## 2019-11-05 RX ORDER — HYDROMORPHONE HYDROCHLORIDE 2 MG/ML
0.25 INJECTION INTRAMUSCULAR; INTRAVENOUS; SUBCUTANEOUS
Refills: 0 | Status: COMPLETED | OUTPATIENT
Start: 2019-11-05 | End: 2019-11-05

## 2019-11-05 RX ORDER — MAGNESIUM SULFATE 500 MG/ML
4 VIAL (ML) INJECTION ONCE
Refills: 0 | Status: DISCONTINUED | OUTPATIENT
Start: 2019-11-05 | End: 2019-11-05

## 2019-11-05 RX ORDER — HEPARIN SODIUM 5000 [USP'U]/ML
5000 INJECTION INTRAVENOUS; SUBCUTANEOUS EVERY 8 HOURS
Refills: 0 | Status: DISCONTINUED | OUTPATIENT
Start: 2019-11-05 | End: 2019-11-05

## 2019-11-05 RX ORDER — OXYCODONE AND ACETAMINOPHEN 5; 325 MG/1; MG/1
1 TABLET ORAL EVERY 4 HOURS
Refills: 0 | Status: DISCONTINUED | OUTPATIENT
Start: 2019-11-05 | End: 2019-11-07

## 2019-11-05 RX ORDER — MAGNESIUM SULFATE 500 MG/ML
1 VIAL (ML) INJECTION ONCE
Refills: 0 | Status: DISCONTINUED | OUTPATIENT
Start: 2019-11-05 | End: 2019-11-05

## 2019-11-05 RX ORDER — HEPARIN SODIUM 5000 [USP'U]/ML
7500 INJECTION INTRAVENOUS; SUBCUTANEOUS EVERY 8 HOURS
Refills: 0 | Status: DISCONTINUED | OUTPATIENT
Start: 2019-11-05 | End: 2019-11-07

## 2019-11-05 RX ADMIN — SODIUM CHLORIDE 120 MILLILITER(S): 9 INJECTION, SOLUTION INTRAVENOUS at 14:54

## 2019-11-05 RX ADMIN — OXYCODONE AND ACETAMINOPHEN 1 TABLET(S): 5; 325 TABLET ORAL at 19:06

## 2019-11-05 RX ADMIN — HEPARIN SODIUM 7500 UNIT(S): 5000 INJECTION INTRAVENOUS; SUBCUTANEOUS at 21:33

## 2019-11-05 RX ADMIN — HEPARIN SODIUM 7500 UNIT(S): 5000 INJECTION INTRAVENOUS; SUBCUTANEOUS at 14:48

## 2019-11-05 RX ADMIN — HYDROMORPHONE HYDROCHLORIDE 0.25 MILLIGRAM(S): 2 INJECTION INTRAMUSCULAR; INTRAVENOUS; SUBCUTANEOUS at 12:52

## 2019-11-05 RX ADMIN — Medication 400 MILLIGRAM(S): at 14:54

## 2019-11-05 RX ADMIN — HYDROMORPHONE HYDROCHLORIDE 0.25 MILLIGRAM(S): 2 INJECTION INTRAMUSCULAR; INTRAVENOUS; SUBCUTANEOUS at 12:30

## 2019-11-05 RX ADMIN — HYDROMORPHONE HYDROCHLORIDE 0.25 MILLIGRAM(S): 2 INJECTION INTRAMUSCULAR; INTRAVENOUS; SUBCUTANEOUS at 12:50

## 2019-11-05 RX ADMIN — PIPERACILLIN AND TAZOBACTAM 200 GRAM(S): 4; .5 INJECTION, POWDER, LYOPHILIZED, FOR SOLUTION INTRAVENOUS at 03:32

## 2019-11-05 RX ADMIN — Medication 1000 MILLIGRAM(S): at 15:10

## 2019-11-05 RX ADMIN — OXYCODONE AND ACETAMINOPHEN 1 TABLET(S): 5; 325 TABLET ORAL at 23:23

## 2019-11-05 NOTE — PROGRESS NOTE ADULT - SUBJECTIVE AND OBJECTIVE BOX
INTERVAL HPI: Patient was prepped and marked by the stoma nurses yesterday evening in preparation for the OR this AM. Had the rest of her bowel prep yesterday and finished it completely. Has some cramping abdominal pain. No N/V. No other complaints.     piperacillin/tazobactam IVPB.. 3.375  enoxaparin Injectable 40  piperacillin/tazobactam IVPB.. 3.375        Vital Signs Last 24 Hrs  T(C): 36.4 (05 Nov 2019 05:05), Max: 36.6 (04 Nov 2019 08:48)  T(F): 97.5 (05 Nov 2019 05:05), Max: 97.9 (04 Nov 2019 08:48)  HR: 72 (05 Nov 2019 05:05) (58 - 72)  BP: 130/73 (05 Nov 2019 05:05) (121/77 - 131/75)  BP(mean): --  RR: 17 (05 Nov 2019 05:05) (17 - 18)  SpO2: 98% (05 Nov 2019 05:05) (98% - 99%)  I&O's Summary    04 Nov 2019 07:01  -  05 Nov 2019 07:00  --------------------------------------------------------  IN: 2520 mL / OUT: 850 mL / NET: 1670 mL        Physical Exam:  General: NAD  Pulmonary: Nonlabored breathing, no respiratory distress  Abdominal: Soft, obese, markings on abdomen from stoma nurses, minimal tenderness,  fistula w/ dressing in place, brown liquid drainage.  Extremities: WWP        LABS:                        9.2    4.13  )-----------( 176      ( 05 Nov 2019 05:51 )             29.7     11-05  	  142  |  107  |  2<L>  ----------------------------<  90  3.7   |  23  |  0.60    Ca    8.4      05 Nov 2019 05:51  Phos  3.9     11-05  Mg     1.8     11-05      PT/INR - ( 05 Nov 2019 05:51 )   PT: 14.9 sec;   INR: 1.31                Assessment and Plan:

## 2019-11-05 NOTE — PROGRESS NOTE ADULT - ASSESSMENT
33F PMH possible L tuboovarian abscess s/p IR drain 04/01/2019 c/b multiple abscesses and sigmoid colon fistula treated with chronic antibiotics s/p ovarian cyst removal 06/2019 c/b abscess s/p operative debridement c/b multiple abscesses and fistulae presents with colocutaneous fistula    - NPO for OR this AM  - IV Zosyn  - Pain/nausea PRN  - BID dressing changes  - OOBA/SCDs/SQH  - AM labs

## 2019-11-05 NOTE — PROGRESS NOTE ADULT - SUBJECTIVE AND OBJECTIVE BOX
POST-OPERATIVE NOTE    Procedure: Creation of diverting loop colostomy    Diagnosis/Indication: Colocutaneous fistula     Surgeon: Christophe    S: Pt has no complaints. Denies CP, SOB, nausea, or vomiting. Pain controlled with medication.    O:  T(C): 36.7 (11-05-19 @ 14:58), Max: 36.7 (11-05-19 @ 14:58)  T(F): 98.1 (11-05-19 @ 14:58), Max: 98.1 (11-05-19 @ 14:58)  HR: 55 (11-05-19 @ 14:58) (50 - 60)  BP: 117/72 (11-05-19 @ 14:58) (111/73 - 128/70)  RR: 17 (11-05-19 @ 14:58) (13 - 23)  SpO2: 96% (11-05-19 @ 14:58) (96% - 97%)  Wt(kg): --                        11.1   11.34 )-----------( 223      ( 05 Nov 2019 14:17 )             36.2     11-05    142  |  107  |  2<L>  ----------------------------<  90  3.7   |  23  |  0.60    Ca    8.4      05 Nov 2019 05:51  Phos  3.9     11-05  Mg     1.8     11-05        Gen: NAD, resting comfortably in bed  C/V: NSR  Pulm: Nonlabored breathing, no respiratory distress  Abd: soft, obese abdomen, ND, attp, midline incision C/D/I, ostomy with appliance without significant output  Extrem: WWP

## 2019-11-05 NOTE — PRE-OP CHECKLIST - SELECT TESTS ORDERED
CXR/BMP/CBC/PT/PTT/INR/CMP/Type and Screen/Urinalysis/UCG/EKG CXR/EKG/BMP/PT/PTT/CBC/Type and Screen/Urinalysis/INR/CMP/HCG

## 2019-11-05 NOTE — BRIEF OPERATIVE NOTE - OPERATION/FINDINGS
Exploratory laparotomy made at midline. Transverse colon mobilized by lysis of adhesions at omentum using ligasure and bovie electrocautery. Transverse colon brought through left sided abdominal wall incision through rectus, ensuring bowel was under no tension. Midline incision fascia closed with 1PDS. Wound irrigated with betadyne. Drains placed over fascia and skin closed loosely with staples. Incision covered with 4x8 gauze and tegaderm. Next, ostomy was brooked over plastic vivek. Ostomy appliance applied.

## 2019-11-05 NOTE — BRIEF OPERATIVE NOTE - NSICDXBRIEFPROCEDURE_GEN_ALL_CORE_FT
PROCEDURES:  Exploratory laparotomy 05-Nov-2019 12:31:07  Alma Mcintosh  Creation of diverting loop colostomy 05-Nov-2019 12:30:57  Alma Mcintosh

## 2019-11-05 NOTE — PROGRESS NOTE ADULT - ASSESSMENT
33F w/ multiple previous intra-abdominal abscesses & fistulas who presented with colocutaneous fistula now s/p creation of diverting loop colostomy, POD0    - CLD  - continue IV LR until increased PO intake  - post-op labs  - Walters  - Pain/nausea PRN  - Ostomy teaching  - AM labs

## 2019-11-06 LAB
ANION GAP SERPL CALC-SCNC: 11 MMOL/L — SIGNIFICANT CHANGE UP (ref 5–17)
BUN SERPL-MCNC: 2 MG/DL — LOW (ref 7–23)
CALCIUM SERPL-MCNC: 8.4 MG/DL — SIGNIFICANT CHANGE UP (ref 8.4–10.5)
CHLORIDE SERPL-SCNC: 102 MMOL/L — SIGNIFICANT CHANGE UP (ref 96–108)
CO2 SERPL-SCNC: 24 MMOL/L — SIGNIFICANT CHANGE UP (ref 22–31)
CREAT SERPL-MCNC: 0.64 MG/DL — SIGNIFICANT CHANGE UP (ref 0.5–1.3)
GLUCOSE SERPL-MCNC: 101 MG/DL — HIGH (ref 70–99)
HCT VFR BLD CALC: 30.9 % — LOW (ref 34.5–45)
HGB BLD-MCNC: 9.4 G/DL — LOW (ref 11.5–15.5)
MAGNESIUM SERPL-MCNC: 1.8 MG/DL — SIGNIFICANT CHANGE UP (ref 1.6–2.6)
MCHC RBC-ENTMCNC: 23.7 PG — LOW (ref 27–34)
MCHC RBC-ENTMCNC: 30.4 GM/DL — LOW (ref 32–36)
MCV RBC AUTO: 78 FL — LOW (ref 80–100)
NRBC # BLD: 0 /100 WBCS — SIGNIFICANT CHANGE UP (ref 0–0)
PHOSPHATE SERPL-MCNC: 4 MG/DL — SIGNIFICANT CHANGE UP (ref 2.5–4.5)
PLATELET # BLD AUTO: 199 K/UL — SIGNIFICANT CHANGE UP (ref 150–400)
POTASSIUM SERPL-MCNC: 3.8 MMOL/L — SIGNIFICANT CHANGE UP (ref 3.5–5.3)
POTASSIUM SERPL-SCNC: 3.8 MMOL/L — SIGNIFICANT CHANGE UP (ref 3.5–5.3)
RBC # BLD: 3.96 M/UL — SIGNIFICANT CHANGE UP (ref 3.8–5.2)
RBC # FLD: 15.4 % — HIGH (ref 10.3–14.5)
SODIUM SERPL-SCNC: 137 MMOL/L — SIGNIFICANT CHANGE UP (ref 135–145)
WBC # BLD: 7.72 K/UL — SIGNIFICANT CHANGE UP (ref 3.8–10.5)
WBC # FLD AUTO: 7.72 K/UL — SIGNIFICANT CHANGE UP (ref 3.8–10.5)

## 2019-11-06 RX ORDER — POTASSIUM CHLORIDE 20 MEQ
20 PACKET (EA) ORAL ONCE
Refills: 0 | Status: COMPLETED | OUTPATIENT
Start: 2019-11-06 | End: 2019-11-06

## 2019-11-06 RX ORDER — KETOROLAC TROMETHAMINE 30 MG/ML
15 SYRINGE (ML) INJECTION EVERY 6 HOURS
Refills: 0 | Status: DISCONTINUED | OUTPATIENT
Start: 2019-11-06 | End: 2019-11-07

## 2019-11-06 RX ORDER — ALPRAZOLAM 0.25 MG
0.5 TABLET ORAL
Refills: 0 | Status: DISCONTINUED | OUTPATIENT
Start: 2019-11-06 | End: 2019-11-07

## 2019-11-06 RX ORDER — MAGNESIUM SULFATE 500 MG/ML
1 VIAL (ML) INJECTION ONCE
Refills: 0 | Status: COMPLETED | OUTPATIENT
Start: 2019-11-06 | End: 2019-11-06

## 2019-11-06 RX ADMIN — OXYCODONE AND ACETAMINOPHEN 1 TABLET(S): 5; 325 TABLET ORAL at 19:24

## 2019-11-06 RX ADMIN — HEPARIN SODIUM 7500 UNIT(S): 5000 INJECTION INTRAVENOUS; SUBCUTANEOUS at 05:38

## 2019-11-06 RX ADMIN — Medication 15 MILLIGRAM(S): at 13:38

## 2019-11-06 RX ADMIN — Medication 15 MILLIGRAM(S): at 20:36

## 2019-11-06 RX ADMIN — OXYCODONE AND ACETAMINOPHEN 1 TABLET(S): 5; 325 TABLET ORAL at 04:09

## 2019-11-06 RX ADMIN — Medication 100 GRAM(S): at 07:49

## 2019-11-06 RX ADMIN — HEPARIN SODIUM 7500 UNIT(S): 5000 INJECTION INTRAVENOUS; SUBCUTANEOUS at 20:36

## 2019-11-06 RX ADMIN — OXYCODONE AND ACETAMINOPHEN 1 TABLET(S): 5; 325 TABLET ORAL at 18:30

## 2019-11-06 RX ADMIN — OXYCODONE AND ACETAMINOPHEN 1 TABLET(S): 5; 325 TABLET ORAL at 09:14

## 2019-11-06 RX ADMIN — OXYCODONE AND ACETAMINOPHEN 1 TABLET(S): 5; 325 TABLET ORAL at 00:23

## 2019-11-06 RX ADMIN — Medication 0.5 MILLIGRAM(S): at 21:37

## 2019-11-06 RX ADMIN — OXYCODONE AND ACETAMINOPHEN 1 TABLET(S): 5; 325 TABLET ORAL at 05:09

## 2019-11-06 RX ADMIN — Medication 15 MILLIGRAM(S): at 14:09

## 2019-11-06 RX ADMIN — Medication 20 MILLIEQUIVALENT(S): at 07:49

## 2019-11-06 RX ADMIN — Medication 15 MILLIGRAM(S): at 20:59

## 2019-11-06 RX ADMIN — OXYCODONE AND ACETAMINOPHEN 1 TABLET(S): 5; 325 TABLET ORAL at 10:20

## 2019-11-06 RX ADMIN — HEPARIN SODIUM 7500 UNIT(S): 5000 INJECTION INTRAVENOUS; SUBCUTANEOUS at 13:43

## 2019-11-06 NOTE — PROGRESS NOTE ADULT - ASSESSMENT
A/P Ok thus far  Ambulate  Clears as tolerated  Enterostomal instruction needed  Low residue diet once passes flatus

## 2019-11-06 NOTE — ADVANCED PRACTICE NURSE CONSULT - ASSESSMENT
33F w/ multiple previous intra-abdominal abscesses & fistulas who presented with colocutaneous fistula now s/p creation of diverting loop colostomy, POD1. Pt up and ambulating, no c/o pain. Stoma pink and functioning, measures 2", serosanguinous drainage noted in old pouch, no gas. New 2 3/4" Barak 2 piece appliance used, pt assisted partially in procedure. Reviewed frequency of emptying and changing appliance. Stoma vivek in place beneath stoma, stoma ring used around stoma prior to applying new appliance. Extra supplies left at bedside.

## 2019-11-06 NOTE — DIETITIAN INITIAL EVALUATION ADULT. - OTHER INFO
33F PMH possible L tuboovarian abscess s/p IR drain 04/01/2019 c/b multiple abscesses and sigmoid colon fistula treated with chronic antibiotics s/p ovarian cyst removal 06/2019 c/b abscess s/p operative debridement c/b multiple abscesses and fistulae now presents with colocutaneous fistula. S/p ex lap, creation of diverting loop colostomy 11/5. Pt feeling well after surgery, Pain and nausea well controlled, but some c/o mild cramping. Minimal drainage through fistula. On assessment, pt resting in bed. Currently on CLD tolerating PO well, denying N/V/Abd pain. Pt consuming 75% of CLD tray and wishing to be advanced to a solid food diet. Discussed diet advancement process from CLD to low fiber diet per MDs discression. Educated pt on colostomy nutrition therapy and low fiber diet- patient appears receptive to education, all questions answered. Educational handout left at bedside. NKFA. Pt following a general healthy diet at home cutting out concentrated sweets. Pt noted intentional weight loss over the last few years. Per Pt, she used to weigh 400lbs and changed her diet in attempt to lose weight. Weight loss was exacerbated 2/2 fistula x1 year ago in conjunction with healthy diet. Admit weight is 266lbs (-134lbs/ 33.5% intentional weight loss over 2-3 years). Pt notes good appetite at home. NFPE was unremarkable. GI: BM, colostomy. Skin: WDL with suprapubic wound, rené score 20. Pain: denied at this time. Please see nutrition recommendations below. RD to follow per protocol.

## 2019-11-06 NOTE — PROGRESS NOTE ADULT - SUBJECTIVE AND OBJECTIVE BOX
POD 1 s/p colostomy formation.  Ambulating a bit.  Walters out.  On clears.  On toradol and prn narcotics for pain.  No N/V    Vital Signs Last 24 Hrs  T(C): 37.4 (06 Nov 2019 15:59), Max: 37.4 (06 Nov 2019 15:59)  T(F): 99.3 (06 Nov 2019 15:59), Max: 99.3 (06 Nov 2019 15:59)  HR: 63 (06 Nov 2019 15:59) (58 - 63)  BP: 133/68 (06 Nov 2019 15:59) (118/73 - 133/68)  BP(mean): --  RR: 18 (06 Nov 2019 15:59) (17 - 18)  SpO2: 96% (06 Nov 2019 15:59) (96% - 97%)    exam  Abd: dressing intact, stoma viable minus ouitput  non tender except at incision                          9.4    7.72  )-----------( 199      ( 06 Nov 2019 05:39 )             30.9   11-06    137  |  102  |  2<L>  ----------------------------<  101<H>  3.8   |  24  |  0.64    Ca    8.4      06 Nov 2019 05:39  Phos  4.0     11-06  Mg     1.8     11-06

## 2019-11-06 NOTE — PROGRESS NOTE ADULT - SUBJECTIVE AND OBJECTIVE BOX
STATUS POST:  11/5: Diverting transverse loop colostomy creation     SUBJECTIVE: Patient seen and examined bedside by chief resident.  patient feel well. pain well controlled. some mild cramping however feeling overall well. minimal drainage from fistula, slowing down significantly since surgery. some incisional pain     heparin  Injectable 7500 Unit(s) SubCutaneous every 8 hours      Vital Signs Last 24 Hrs  T(C): 37.2 (06 Nov 2019 05:51), Max: 37.2 (06 Nov 2019 05:51)  T(F): 98.9 (06 Nov 2019 05:51), Max: 98.9 (06 Nov 2019 05:51)  HR: 58 (06 Nov 2019 05:51) (50 - 60)  BP: 132/76 (06 Nov 2019 05:51) (111/73 - 132/76)  BP(mean): 87 (05 Nov 2019 14:30) (84 - 98)  RR: 17 (06 Nov 2019 05:51) (13 - 23)  SpO2: 97% (06 Nov 2019 05:51) (96% - 98%)  I&O's Detail    05 Nov 2019 07:01  -  06 Nov 2019 07:00  --------------------------------------------------------  IN:    Other: 1600 mL  Total IN: 1600 mL    OUT:    Indwelling Catheter - Urethral: 2300 mL  Total OUT: 2300 mL    Total NET: -700 mL          General: NAD, resting comfortably in bed  C/V: NSR  Pulm: Nonlabored breathing, no respiratory distress  Abd: soft, ND, midline tenderness around incision, midline dressing c/d/i, ostomy with some liquid output, fistula with minimal output on dressing   Extrem: WWP, no edema, SCDs in place        LABS:                        9.4    7.72  )-----------( 199      ( 06 Nov 2019 05:39 )             30.9     11-06    137  |  102  |  2<L>  ----------------------------<  101<H>  3.8   |  24  |  0.64    Ca    8.4      06 Nov 2019 05:39  Phos  4.0     11-06  Mg     1.8     11-06      PT/INR - ( 05 Nov 2019 05:51 )   PT: 14.9 sec;   INR: 1.31                RADIOLOGY & ADDITIONAL STUDIES:

## 2019-11-06 NOTE — PROGRESS NOTE ADULT - ASSESSMENT
33F PMH possible L tuboovarian abscess s/p IR drain 04/01/2019 c/b multiple abscesses and sigmoid colon fistula treated with chronic antibiotics s/p ovarian cyst removal 06/2019 c/b abscess s/p operative debridement c/b multiple abscesses and fistulae presents with colocutaneous fistula    - Pain/nausea PRN  - CLD/IVF  - OOBA/SCDs/SQH  - ostomy nurse  - [facility]  - AM labs

## 2019-11-06 NOTE — DIETITIAN INITIAL EVALUATION ADULT. - ENERGY NEEDS
Ht: 63”, Wt: 120.7kg/266 lbs, IBW: 115lbs, %IBW: 231%, BMI: 47.1 kg/m2 (obese)  IBW used for calculations as pt >120% of IBW. Needs adjusted for overweight and post-op.

## 2019-11-06 NOTE — DIETITIAN INITIAL EVALUATION ADULT. - ADD RECOMMEND
1. CLD 2. Recommend advance to low fiber diet when medically feasible. 3. RD to attempt diet reenforcement on follow up

## 2019-11-07 ENCOUNTER — TRANSCRIPTION ENCOUNTER (OUTPATIENT)
Age: 34
End: 2019-11-07

## 2019-11-07 VITALS
DIASTOLIC BLOOD PRESSURE: 69 MMHG | OXYGEN SATURATION: 97 % | TEMPERATURE: 98 F | HEART RATE: 57 BPM | RESPIRATION RATE: 17 BRPM | SYSTOLIC BLOOD PRESSURE: 126 MMHG

## 2019-11-07 LAB
ANION GAP SERPL CALC-SCNC: 11 MMOL/L — SIGNIFICANT CHANGE UP (ref 5–17)
BUN SERPL-MCNC: 3 MG/DL — LOW (ref 7–23)
CALCIUM SERPL-MCNC: 8.6 MG/DL — SIGNIFICANT CHANGE UP (ref 8.4–10.5)
CHLORIDE SERPL-SCNC: 102 MMOL/L — SIGNIFICANT CHANGE UP (ref 96–108)
CO2 SERPL-SCNC: 25 MMOL/L — SIGNIFICANT CHANGE UP (ref 22–31)
CREAT SERPL-MCNC: 0.58 MG/DL — SIGNIFICANT CHANGE UP (ref 0.5–1.3)
GLUCOSE SERPL-MCNC: 92 MG/DL — SIGNIFICANT CHANGE UP (ref 70–99)
HCT VFR BLD CALC: 31.2 % — LOW (ref 34.5–45)
HGB BLD-MCNC: 9.1 G/DL — LOW (ref 11.5–15.5)
MAGNESIUM SERPL-MCNC: 1.6 MG/DL — SIGNIFICANT CHANGE UP (ref 1.6–2.6)
MCHC RBC-ENTMCNC: 23.7 PG — LOW (ref 27–34)
MCHC RBC-ENTMCNC: 29.2 GM/DL — LOW (ref 32–36)
MCV RBC AUTO: 81.3 FL — SIGNIFICANT CHANGE UP (ref 80–100)
NRBC # BLD: 0 /100 WBCS — SIGNIFICANT CHANGE UP (ref 0–0)
PHOSPHATE SERPL-MCNC: 3.3 MG/DL — SIGNIFICANT CHANGE UP (ref 2.5–4.5)
PLATELET # BLD AUTO: 143 K/UL — LOW (ref 150–400)
POTASSIUM SERPL-MCNC: 3.9 MMOL/L — SIGNIFICANT CHANGE UP (ref 3.5–5.3)
POTASSIUM SERPL-SCNC: 3.9 MMOL/L — SIGNIFICANT CHANGE UP (ref 3.5–5.3)
RBC # BLD: 3.84 M/UL — SIGNIFICANT CHANGE UP (ref 3.8–5.2)
RBC # FLD: 15.6 % — HIGH (ref 10.3–14.5)
SODIUM SERPL-SCNC: 138 MMOL/L — SIGNIFICANT CHANGE UP (ref 135–145)
WBC # BLD: 7.17 K/UL — SIGNIFICANT CHANGE UP (ref 3.8–10.5)
WBC # FLD AUTO: 7.17 K/UL — SIGNIFICANT CHANGE UP (ref 3.8–10.5)

## 2019-11-07 RX ORDER — OXYCODONE HYDROCHLORIDE 5 MG/1
1 TABLET ORAL
Qty: 6 | Refills: 0
Start: 2019-11-07 | End: 2019-11-08

## 2019-11-07 RX ORDER — MAGNESIUM SULFATE 500 MG/ML
2 VIAL (ML) INJECTION EVERY 6 HOURS
Refills: 0 | Status: COMPLETED | OUTPATIENT
Start: 2019-11-07 | End: 2019-11-07

## 2019-11-07 RX ADMIN — Medication 15 MILLIGRAM(S): at 14:36

## 2019-11-07 RX ADMIN — Medication 15 MILLIGRAM(S): at 06:11

## 2019-11-07 RX ADMIN — Medication 15 MILLIGRAM(S): at 14:55

## 2019-11-07 RX ADMIN — Medication 50 GRAM(S): at 10:15

## 2019-11-07 RX ADMIN — Medication 15 MILLIGRAM(S): at 06:30

## 2019-11-07 NOTE — PROGRESS NOTE ADULT - ASSESSMENT
Doing well overall.  Low residue diet today.  More enterostomal teaching.   If able to empty bag and change base today and if also tolerated solid food then could go home today.  Otherwise, home in AM.  VNS for stoma care and DPO f/u next week in my office.  Want to send home on oral antibiotics.  ID consult: Will ask ID what they recommend for outpatient use.  Probably 2 week course of antibiotics, at least.  Will repeat imaging in 1 month unless problems arise.

## 2019-11-07 NOTE — DISCHARGE NOTE PROVIDER - HOSPITAL COURSE
A/P: 33F PMH possible L tuboovarian abscess s/p IR drain 04/01/2019 c/b multiple abscesses and sigmoid colon fistula treated with chronic antibiotics s/p ovarian cyst removal 06/2019 c/b abscess s/p operative debridement c/b multiple abscesses and fistulae presents with colocutaneous fistula                *INCOMPLETE* A/P: 33F PMH possible L tuboovarian abscess s/p IR drain 04/01/2019 c/b multiple abscesses and sigmoid colon fistula treated with chronic antibiotics s/p ovarian cyst removal 06/2019 c/b abscess s/p operative debridement c/b multiple abscesses and fistulae presented to [facility] on 11/01 with colocutaneous fistula. Pt was admitted under the care of Dr. Saeed and was started on bowel prep'ed. On 11/05 pt underwent diverting transeverse loop colostomy creation. Her postoperative course was unremarkable with advancement of diet, passing trial of void, and pain control. Pt underwent ostomy teachings with ostomy nurse while admitted and pt was swenm by ID team who recommended that no antibiotics were necessary for discharge. On day of discharge patient was stable to be d/c'd home.

## 2019-11-07 NOTE — CONSULT NOTE ADULT - ASSESSMENT
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 ID consulted for antibiotic prophylaxis?     Recommendations:     1. pt with no leukocytosis and afebrile. Continue current management for now.     Discussed with Dr. Valdivia. 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 ID consulted for antibiotic prophylaxis?     Recommendations:     1. pt with no leukocytosis and afebrile. Continue current management for now, Dr Valdivia will see patient tomorrow     Discussed with Dr. Valdivia. 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 ID consulted for antibiotic prophylaxis?     Recommendations:     Patient currently without any symptoms of infection. She was previously followed at another hospital. With multiple cultures and CAT Scans  The patient has all these reports on her electronic records    The patient currently does not need antibiotics however more information is required. She wants to transfer her care to [facility]  The patient should be given an appointment in the outpatient infectious diseases for next week    Tel number  [phone] 33F PMH possible L tuboovarian abscess s/p IR drain 04/01/2019 c/b multiple abscesses and sigmoid colon fistula treated with chronic antibiotics s/p ovarian cyst removal 06/2019 c/b abscess s/p operative debridement c/b multiple abscesses and fistulae presents with colocutaneous fistula now s/p diverting transverse loop colostomy creation 11/5. ID consulted for antibiotic prophylaxis?     Recommendations:     Patient currently without any symptoms of infection. She was previously followed at another hospital. With multiple cultures and CAT Scans  The patient has all these reports on her electronic records    The patient currently does not need antibiotics however more information is required. She wants to transfer her care to [facility]  The patient should be given an appointment in the outpatient infectious diseases for next week    Tel number  [phone]  The infectious diseases service will no longer follow this patient

## 2019-11-07 NOTE — DISCHARGE NOTE PROVIDER - NSDCFUADDINST_GEN_ALL_CORE_FT
Please empty colostomy output daily or as needed and record outputs. Instructions as per ostomy nurses. Ostomy Care:  Please empty colostomy output daily or as needed and record outputs. Instructions as per ostomy nurses.    Follow Up:  Please follow up with Dr. Saeed in one week; you may call the office to make an appointment at your earliest convenience.    General Discharge Instructions:  Please resume all regular home medications unless specifically advised not to take a particular medication. Also, please take any new medications as prescribed.  Please get plenty of rest, continue to ambulate several times per day, and drink adequate amounts of fluids. Avoid lifting weights greater than 5-10 lbs until you follow-up with your surgeon, who will instruct you further regarding activity restrictions.  Avoid driving or operating heavy machinery while taking pain medications.  Please follow-up with your surgeon and Primary Care Provider (PCP) as advised.    Incision Care:  *Please call your doctor or nurse practitioner if you have increased pain, swelling, redness, or drainage from the incision site.  *Avoid swimming and baths until your follow-up appointment.  *You may shower, and wash surgical incisions with a mild soap and warm water. Gently pat the area dry.    Warning Signs:  Please call your doctor if you experience the following:  *You experience new chest pain, pressure, squeezing or tightness.  *New or worsening cough, shortness of breath, or wheeze.  *If you are vomiting and cannot keep down fluids or your medications.  *You are getting dehydrated due to continued vomiting, diarrhea, or other reasons. Signs of dehydration include dry mouth, rapid heartbeat, or feeling dizzy or faint when standing.  *You see blood or dark/black material when you vomit or have a bowel movement.  *You experience burning when you urinate, have blood in your urine, or experience a discharge.  *Your pain is not improving within 8-12 hours or is not gone within 24 hours. Call or return immediately if your pain is getting worse, changes location, or moves to your chest or back.  *You have shaking chills, or fever greater than 101.5 degrees Fahrenheit or 38 degrees Celsius.  *Any change in your symptoms, or any new symptoms that concern you. Ostomy Care:  Please empty colostomy output daily or as needed and record outputs. Instructions as per ostomy nurses.    Surgery Follow Up:  Please follow up with Dr. Saeed in one week; you may call the office to make an appointment at your earliest convenience.    Infectious Disease Follow Up:  Please follow up with Dr. Valdivia in 1-2 weeks; you may call the office to make an appointment at your earliest convenience at  [phone].      General Discharge Instructions:  Please resume all regular home medications unless specifically advised not to take a particular medication. Also, please take any new medications as prescribed. Take 2 tabs tylenol every 6 hours as needed for pain management. You have also been prescribed oxycodone for severe breakthrough pain not controlled by tylenol. Take 1 tabs as prescribed for severe breakthrough pain.   Please get plenty of rest, continue to ambulate several times per day, and drink adequate amounts of fluids. Avoid lifting weights greater than 5-10 lbs until you follow-up with your surgeon, who will instruct you further regarding activity restrictions.  Avoid driving or operating heavy machinery while taking pain medications.  Please follow-up with your surgeon and Primary Care Provider (PCP) as advised.    Incision Care:  *Please call your doctor or nurse practitioner if you have increased pain, swelling, redness, or drainage from the incision site.  *Avoid swimming and baths until your follow-up appointment.  *You may shower, and wash surgical incisions with a mild soap and warm water. Gently pat the area dry.    Warning Signs:  Please call your doctor if you experience the following:  *You experience new chest pain, pressure, squeezing or tightness.  *New or worsening cough, shortness of breath, or wheeze.  *If you are vomiting and cannot keep down fluids or your medications.  *You are getting dehydrated due to continued vomiting, diarrhea, or other reasons. Signs of dehydration include dry mouth, rapid heartbeat, or feeling dizzy or faint when standing.  *You see blood or dark/black material when you vomit or have a bowel movement.  *You experience burning when you urinate, have blood in your urine, or experience a discharge.  *Your pain is not improving within 8-12 hours or is not gone within 24 hours. Call or return immediately if your pain is getting worse, changes location, or moves to your chest or back.  *You have shaking chills, or fever greater than 101.5 degrees Fahrenheit or 38 degrees Celsius.  *Any change in your symptoms, or any new symptoms that concern you.

## 2019-11-07 NOTE — PROGRESS NOTE ADULT - SUBJECTIVE AND OBJECTIVE BOX
INTERVAL HPI/OVERNIGHT EVENTS: NAEO. Restarted home xanax. AFVSS. Reported gas in ostomy bag.    STATUS POST:  Diverting transverse loop colostomy creation 11/5    POST OPERATIVE DAY #: 2    SUBJECTIVE: Pt seen and examined at bedside this AM by surgery team. Pain is well controlled, tolerating diet. No acute complaints. Denies f/n/v/cp/sob.    MEDICATIONS  (STANDING):  heparin  Injectable 7500 Unit(s) SubCutaneous every 8 hours  influenza   Vaccine 0.5 milliLiter(s) IntraMuscular once  magnesium sulfate  IVPB 2 Gram(s) IV Intermittent every 6 hours    MEDICATIONS  (PRN):  ALPRAZolam 0.5 milliGRAM(s) Oral two times a day PRN Anxiety  ketorolac   Injectable 15 milliGRAM(s) IV Push every 6 hours PRN Severe Pain (7 - 10)  oxycodone    5 mG/acetaminophen 325 mG 1 Tablet(s) Oral every 4 hours PRN Moderate Pain (4 - 6)      Vital Signs Last 24 Hrs  T(C): 37.1 (07 Nov 2019 09:35), Max: 37.4 (06 Nov 2019 15:59)  T(F): 98.8 (07 Nov 2019 09:35), Max: 99.3 (06 Nov 2019 15:59)  HR: 58 (07 Nov 2019 09:35) (58 - 76)  BP: 137/76 (07 Nov 2019 09:35) (122/67 - 138/71)  BP(mean): --  RR: 18 (07 Nov 2019 09:35) (17 - 18)  SpO2: 98% (07 Nov 2019 09:35) (96% - 98%)    PHYSICAL EXAM:      Constitutional: A&Ox3, NAD    Respiratory: non labored breathing, no respiratory distress    Cardiovascular: NSR, RRR    Gastrointestinal: Abdomen soft, nd, appropriately ttp to incisions. Midline dressing c/d/i, ostomy with some liquid output, fistula with minimal output on dressing.     Extremities: wwp, no calf tenderness or edema. SCDs in place.      I&O's Detail    06 Nov 2019 07:01  -  07 Nov 2019 07:00  --------------------------------------------------------  IN:    Oral Fluid: 550 mL  Total IN: 550 mL    OUT:    Indwelling Catheter - Urethral: 500 mL    Voided: 1150 mL  Total OUT: 1650 mL    Total NET: -1100 mL          LABS:                        9.1    7.17  )-----------( 143      ( 07 Nov 2019 07:04 )             31.2     11-07    138  |  102  |  3<L>  ----------------------------<  92  3.9   |  25  |  0.58    Ca    8.6      07 Nov 2019 07:04  Phos  3.3     11-07  Mg     1.6     11-07            RADIOLOGY & ADDITIONAL STUDIES:

## 2019-11-07 NOTE — DISCHARGE NOTE PROVIDER - NSDCMRMEDTOKEN_GEN_ALL_CORE_FT
ibuprofen 800 mg oral tablet: 1 tab(s) orally once a day  Xanax 0.5 mg oral tablet: 1 tab(s) orally 2 times a day Adapt Barrier Rings #8805: [employer] Barrier Rings #8805 - 3 month supply  Adapt Stoma Powder #7906: Adapt Stoma Powder #7906 - 3 month supply  [employer] Drainable Pouches 2.75&quot; #08376: [employer] Drainable Pouches 2.75&quot; #87647 - 3 month supply  [employer] Skin Barriers #25940: [employer]ak Skin Barriers 2.75&quot; #34821 - 3 month supply  Oxaydo 5 mg oral tablet: 1 tab(s) orally every 6 hours as needed for severe breakthrough pain MDD:4  Skin Barrier Wipes (Alcohol Free): Skin Barrier Wipes (alcohol free) - 3 month supply  Xanax 0.5 mg oral tablet: 1 tab(s) orally 2 times a day

## 2019-11-07 NOTE — DISCHARGE NOTE PROVIDER - CARE PROVIDERS DIRECT ADDRESSES
[email or web address]

## 2019-11-07 NOTE — CONSULT NOTE ADULT - SUBJECTIVE AND OBJECTIVE BOX
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 She had a 9.1cm complex L adnexal pelvic mass/cyst (imaged 2/8/2019 and 3/21/2019). She had a questionable tuboovarian abscess noted on 3/31/2019. IR performed a percutaneous drainage of the left hemipelvic collection on 4/1/2019. This was complicated by an abscess that grew.  In 06/2019, gynecology performed an ovarian cystectomy and abscess drainage. This was complicated by a wound infection about 6 weeks postoperatively, that was debrided and then complicated by a colocutaneous fistula. The rectosigmoid fistula is low output with persistent abdominal abscesses on CT imaging. A CT abscessogram on 10/7/2019 showed no fistula between the small bowel or colon, but rectal contrast study showed a complex fistulous communication between the rectosigmoid colon and abdominal wall with tracking to a small collection in the pelvis and possible bladder. Attempts to cannulate the fistula from the skin level were not successful. The patient currently does not have any drains. She has been able to tolerate PO and is having 1-2 bowel movements per day without nausea/vomiting. Her wound drainage has been purulent, but was feculent today. She endorses urinary frequency, but denies other LUTS. (01 Nov 2019 19:26)      REVIEW OF SYSTEMS:    CONSTITUTIONAL: No weakness, fevers or chills  EYES/ENT: No visual changes;  No vertigo or throat pain   NECK: No pain or stiffness  RESPIRATORY: No cough, wheezing, hemoptysis; No shortness of breath  CARDIOVASCULAR: No chest pain or palpitations  GASTROINTESTINAL: No abdominal or epigastric pain. No nausea, vomiting, or hematemesis; No diarrhea or constipation. No melena or hematochezia.  GENITOURINARY: No dysuria, frequency or hematuria  NEUROLOGICAL: No numbness or weakness  SKIN: No itching, rashes      Recent Ill Contacts:	[] No	[] Yes:  Recent Travel History:	[] No	[] Yes:  Recent Animal/Insect Exposure/Tick Bites:	[] No	[] Yes:    Allergies    No Known Allergies    Intolerances      Antimicrobials:      Other Medications:  ALPRAZolam 0.5 milliGRAM(s) Oral two times a day PRN  heparin  Injectable 7500 Unit(s) SubCutaneous every 8 hours  influenza   Vaccine 0.5 milliLiter(s) IntraMuscular once  ketorolac   Injectable 15 milliGRAM(s) IV Push every 6 hours PRN  magnesium sulfate  IVPB 2 Gram(s) IV Intermittent every 6 hours  oxycodone    5 mG/acetaminophen 325 mG 1 Tablet(s) Oral every 4 hours PRN      FAMILY HISTORY:    PAST MEDICAL & SURGICAL HISTORY:  Colocutaneous fistula  Ovarian cyst  Tubo-ovarian abscess  S/P ovarian cystectomy    SOCIAL HISTORY:    IMMUNIZATIONS  [] Up to Date		[] Not Up to Date:  Recent Immunizations:	[] No	[] Yes:    Daily     Daily   Head Circumference:  Vital Signs Last 24 Hrs  T(C): 37 (07 Nov 2019 12:30), Max: 37.4 (06 Nov 2019 15:59)  T(F): 98.6 (07 Nov 2019 12:30), Max: 99.3 (06 Nov 2019 15:59)  HR: 61 (07 Nov 2019 12:30) (58 - 76)  BP: 117/62 (07 Nov 2019 12:30) (117/62 - 138/71)  BP(mean): --  RR: 18 (07 Nov 2019 12:30) (17 - 18)  SpO2: 95% (07 Nov 2019 12:30) (95% - 98%)    PHYSICAL EXAM  General: NAD, resting comfortably in bed  C/V: NSR  Pulm: Nonlabored breathing, no respiratory distress  Abd: soft, ND, midline tenderness around incision, midline dressing c/d/i, ostomy with some liquid output, fistula with minimal output on dressing   Extrem: WWP, no edema, SCDs in place     Lab Results:                        9.1    7.17  )-----------( 143      ( 07 Nov 2019 07:04 )             31.2     11-07    138  |  102  |  3<L>  ----------------------------<  92  3.9   |  25  |  0.58    Ca    8.6      07 Nov 2019 07:04  Phos  3.3     11-07  Mg     1.6     11-07

## 2019-11-07 NOTE — DISCHARGE NOTE NURSING/CASE MANAGEMENT/SOCIAL WORK - PATIENT PORTAL LINK FT
You can access the FollowMyHealth Patient Portal offered by [facility] by registering at the following website: [email or web address]. By joining [employer]’s FollowMyHealth portal, you will also be able to view your health information using other applications (apps) compatible with our system.

## 2019-11-07 NOTE — PROGRESS NOTE ADULT - ASSESSMENT
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    - Pain/nausea PRN  - Advance to LFD  - OOBA/SCDs/SQH  - Ostomy nurse consult   - AM labs  - Possible d/c today w/ 2 wk course of PO abx

## 2019-11-07 NOTE — DISCHARGE NOTE PROVIDER - CARE PROVIDER_API CALL
Jerry Saeed)  ColonRectal Surgery  [address], [address]  [place], [place] [address]  Phone: [phone]  Follow Up Time: Jerry Saeed (MD)  ColonRectal Surgery  [address], [address]  [place] [address]  Phone: [phone]  Follow Up Time:     Henry Valdivia)  Infectious Disease; Internal Medicine  [address] [facility], [address]  [place] [address]  Phone: [phone]  Fax: [phone]  Follow Up Time:

## 2019-11-07 NOTE — PROGRESS NOTE ADULT - SUBJECTIVE AND OBJECTIVE BOX
POD 2 s/p colostomy formation  Passing flatus and very small amount of stool  Ambulating  Voiding well.  Starting low residue diet today.  Enterostomal RN worked with patient yesterday    Vital Signs Last 24 Hrs  T(C): 37 (07 Nov 2019 05:05), Max: 37.4 (06 Nov 2019 15:59)  T(F): 98.6 (07 Nov 2019 05:05), Max: 99.3 (06 Nov 2019 15:59)  HR: 76 (07 Nov 2019 05:05) (60 - 76)  BP: 138/71 (07 Nov 2019 05:05) (122/67 - 138/71)  BP(mean): --  RR: 18 (07 Nov 2019 05:05) (17 - 18)  SpO2: 97% (07 Nov 2019 05:05) (96% - 97%)    abd: not distended, no change distension wise  stoma in place, small amount of stool seen in bag  incision intact and clean, dressing has been changed  ext: without calf tenderness                          9.1    7.17  )-----------( 143      ( 07 Nov 2019 07:04 )             31.2   11-07    138  |  102  |  3<L>  ----------------------------<  92  3.9   |  25  |  0.58    Ca    8.6      07 Nov 2019 07:04  Phos  3.3     11-07  Mg     1.6     11-07

## 2019-11-07 NOTE — DISCHARGE NOTE PROVIDER - NSDCCPCAREPLAN_GEN_ALL_CORE_FT
PRINCIPAL DISCHARGE DIAGNOSIS  Diagnosis: Fistula  Assessment and Plan of Treatment: transverse diverting loop colostomy creation

## 2019-11-07 NOTE — DISCHARGE NOTE PROVIDER - PROVIDER TOKENS
PROVIDER:[TOKEN:[[id number]]] PROVIDER:[TOKEN:[[id number]]],PROVIDER:[[id number]]]

## 2019-11-07 NOTE — ADVANCED PRACTICE NURSE CONSULT - ASSESSMENT
Pt demonstrated placing new 2 3/4" Barak appliance independently with minimal verbal prompting. Small amount of brown liquid effluent in appliance, pt also demonstrated emptying appliance in toilet. Explained to pt how to order supplies, extra supplies given to patient for discharge. Handout on diet tips with ostomy also left with patient.

## 2019-11-13 DIAGNOSIS — Z98.890 OTHER SPECIFIED POSTPROCEDURAL STATES: ICD-10-CM

## 2019-11-13 DIAGNOSIS — T81.83XA PERSISTENT POSTPROCEDURAL FISTULA, INITIAL ENCOUNTER: ICD-10-CM

## 2019-11-13 DIAGNOSIS — R10.9 UNSPECIFIED ABDOMINAL PAIN: ICD-10-CM

## 2019-11-13 DIAGNOSIS — E66.01 MORBID (SEVERE) OBESITY DUE TO EXCESS CALORIES: ICD-10-CM

## 2019-11-13 DIAGNOSIS — D64.9 ANEMIA, UNSPECIFIED: ICD-10-CM
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
## 2019-11-13 DIAGNOSIS — Z87.891 PERSONAL HISTORY OF NICOTINE DEPENDENCE: ICD-10-CM

## 2019-11-13 PROCEDURE — 99285 EMERGENCY DEPT VISIT HI MDM: CPT | Mod: 25

## 2019-11-13 PROCEDURE — 84702 CHORIONIC GONADOTROPIN TEST: CPT

## 2019-11-13 PROCEDURE — 86901 BLOOD TYPING SEROLOGIC RH(D): CPT

## 2019-11-13 PROCEDURE — 85027 COMPLETE CBC AUTOMATED: CPT

## 2019-11-13 PROCEDURE — 86900 BLOOD TYPING SEROLOGIC ABO: CPT

## 2019-11-13 PROCEDURE — 84100 ASSAY OF PHOSPHORUS: CPT

## 2019-11-13 PROCEDURE — 85730 THROMBOPLASTIN TIME PARTIAL: CPT

## 2019-11-13 PROCEDURE — 85025 COMPLETE CBC W/AUTO DIFF WBC: CPT

## 2019-11-13 PROCEDURE — 36415 COLL VENOUS BLD VENIPUNCTURE: CPT

## 2019-11-13 PROCEDURE — 80048 BASIC METABOLIC PNL TOTAL CA: CPT

## 2019-11-13 PROCEDURE — 86850 RBC ANTIBODY SCREEN: CPT

## 2019-11-13 PROCEDURE — 71045 X-RAY EXAM CHEST 1 VIEW: CPT

## 2019-11-13 PROCEDURE — 85610 PROTHROMBIN TIME: CPT

## 2019-11-13 PROCEDURE — 80053 COMPREHEN METABOLIC PANEL: CPT

## 2019-11-13 PROCEDURE — 83735 ASSAY OF MAGNESIUM: CPT

## 2019-11-13 PROCEDURE — 93005 ELECTROCARDIOGRAM TRACING: CPT

## 2019-11-13 RX ORDER — OXYCODONE HYDROCHLORIDE 5 MG/1
1 TABLET ORAL
Qty: 12 | Refills: 0
Start: 2019-11-13 | End: 2019-11-15

## 2019-11-15 ENCOUNTER — APPOINTMENT (OUTPATIENT)
Dept: COLORECTAL SURGERY | Facility: CLINIC | Age: 34
End: 2019-11-15

## 2019-11-15 VITALS
TEMPERATURE: 97.6 F | DIASTOLIC BLOOD PRESSURE: 80 MMHG | HEIGHT: 63 IN | HEART RATE: 72 BPM | WEIGHT: 260.88 LBS | SYSTOLIC BLOOD PRESSURE: 121 MMHG | BODY MASS INDEX: 46.22 KG/M2

## 2019-11-15 NOTE — PHYSICAL EXAM
[FreeTextEntry1] : PE:\par AOA, looks well\par Chest: CTA bilaterally\par CV: S1S2, RRR\par Abdomen: soft, non tender.  LLQ stoma edematous, pink with vivek in place. + moist rash with satellite lesions at base and also some proximally.  Midline staple line clean with staples and penrose drains in place, no erythema and minimal drainage.     EC fistula within pannicular fold with minimal discharge. ++ moist erythematous fungal rash.

## 2019-11-15 NOTE — HISTORY OF PRESENT ILLNESS
[FreeTextEntry1] : Patient with colocutaneous fistula who is now post op  loop colostomy. Discharged on 11/7/2019.  Doing well, denies pain, nausea or vomiting.  No fevers. Fistula output has becomes small amount of mucous only.   Is on no antibiotics, currently has appointment as out patient scheduled with ID.  Doing stoma  care on own.  Is managing a two piece pouch. Does complain of itching and a "rash" on the bottom.

## 2019-11-15 NOTE — ASSESSMENT
[FreeTextEntry1] : S/P ex lap with creation of loop colostomy. Doing well\par Penrose drains and some staples removed. \par Onur left in place. \par Probably fungal rash will treat with nystatin,\par follow up in one week for remainder of staples. and onur removal.

## 2019-11-18 ENCOUNTER — APPOINTMENT (OUTPATIENT)
Dept: INFECTIOUS DISEASE | Facility: CLINIC | Age: 34
End: 2019-11-18
Payer: MEDICAID

## 2019-11-18 ENCOUNTER — OUTPATIENT (OUTPATIENT)
Dept: OUTPATIENT SERVICES | Facility: HOSPITAL | Age: 34
LOS: 1 days | End: 2019-11-18
Payer: MEDICAID

## 2019-11-18 VITALS
TEMPERATURE: 98.1 F | DIASTOLIC BLOOD PRESSURE: 80 MMHG | WEIGHT: 263 LBS | OXYGEN SATURATION: 99 % | BODY MASS INDEX: 46.6 KG/M2 | HEART RATE: 78 BPM | SYSTOLIC BLOOD PRESSURE: 126 MMHG | HEIGHT: 63 IN

## 2019-11-18 DIAGNOSIS — Z98.890 OTHER SPECIFIED POSTPROCEDURAL STATES: Chronic | ICD-10-CM

## 2019-11-18 DIAGNOSIS — Z78.9 OTHER SPECIFIED HEALTH STATUS: ICD-10-CM

## 2019-11-18 DIAGNOSIS — B97.89 OTHER VIRAL AGENTS AS THE CAUSE OF DISEASES CLASSIFIED ELSEWHERE: ICD-10-CM

## 2019-11-18 PROCEDURE — G0463: CPT

## 2019-11-18 PROCEDURE — 99203 OFFICE O/P NEW LOW 30 MIN: CPT

## 2019-11-18 NOTE — ASSESSMENT
[FreeTextEntry1] : 34 F h/o possible tubo-ovarian abscess, complicated by enterocutaneous fistula to abdominal wall and bladder, s/p loop colostomy 11/7/19.\par \par Presents today to discuss need for antibiotics and continued drainage from prior surgery site.\par \par Overall the patient is feeling better. WBC returned to normal postoperatively. Was 16,000 on admission and she left [facility] with normal WBC count. She has not had fevers or chills.\par \par I suspect her fistula site will continue to drain.  It is not clear to me if this will eventually cease or if it may drain continually. It has already decreased in the amount of drainage and she just changes the pad once per day.\par \par For now I recommended observation and to monitor the pattern of drainage.\par I do not think antibiotics will alter this drainage.\par She has a fungal rash and she should take the nystatin powder prescribed to her.\par If she is to develop dysuria she was advised to check urine for infection as it seems the fistual was also to the bladder on the 10/7/19 CT scan.\par \par I asked that she return in 2-3 months for f/up but she can come sooner if needed.\par \par

## 2019-11-18 NOTE — REVIEW OF SYSTEMS
[Recent Weight Loss (___ Lbs)] : recent [unfilled] ~Ulb weight loss [As Noted in HPI] : as noted in HPI [Skin Lesions] : skin lesion [Negative] : Heme/Lymph [de-identified] : lower abdomen wound with drainage

## 2019-11-18 NOTE — CONSULT LETTER
[Dear  ___] : Dear  [unfilled], [Consult Letter:] : I had the pleasure of evaluating your patient, [unfilled]. [Please see my note below.] : Please see my note below. [Consult Closing:] : Thank you very much for allowing me to participate in the care of this patient.  If you have any questions, please do not hesitate to contact me. [FreeTextEntry2] : Dr. Jerry Saeed [Sincerely,] : Sincerely, [FreeTextEntry3] : \par Margarita Davidson MD\par  of Medicine\par Division of Infectious Diseases\par [facility]\par [address] [place] [address] DrUmang\[place] [address]\par Tel: [phone]\par Fax: [phone] [DrUmang  ___] : Dr. CLARKE

## 2019-11-18 NOTE — PHYSICAL EXAM
[General Appearance - Alert] : alert [General Appearance - In No Acute Distress] : in no acute distress [General Appearance - Well-Appearing] : healthy appearing [PERRL With Normal Accommodation] : pupils were equal in size, round, reactive to light [Sclera] : the sclera and conjunctiva were normal [Outer Ear] : the ears and nose were normal in appearance [Oropharynx] : the oropharynx was normal with no thrush [Neck Appearance] : the appearance of the neck was normal [] : the neck was supple [Auscultation Breath Sounds / Voice Sounds] : lungs were clear to auscultation bilaterally [Heart Rate And Rhythm] : heart rate was normal and rhythm regular [Heart Sounds] : normal S1 and S2 [Heart Sounds Gallop] : no gallops [Murmurs] : no murmurs [Bowel Sounds] : normal bowel sounds [Heart Sounds Pericardial Friction Rub] : no pericardial rub [Abdomen Tenderness] : non-tender [Abdomen Soft] : soft [Cervical Lymph Nodes Enlarged Posterior Bilaterally] : posterior cervical [Cervical Lymph Nodes Enlarged Anterior Bilaterally] : anterior cervical [Supraclavicular Lymph Nodes Enlarged Bilaterally] : supraclavicular [FreeTextEntry1] : +ostomy, pelvic incision with white thick discharge.  Midline incision with staples, c/d/i.  Fungal rash noted in skin fold of abdomen

## 2019-11-18 NOTE — HISTORY OF PRESENT ILLNESS
[FreeTextEntry1] : 34 F h/o possible tubo-ovarian abscess, 2/2019.  She had multiple CT scans from Feb thru April 2019.  \par She had removal of TOA 6/19/2019.  \par \par In Feb 2019 she had faint pain at LLQ.  She was sent to ER and saw OB-GYN.  Admitted to [facility].  CT scan did not visualize colon well.  \par \par She reports she took many different antibiotics during this time: doxy, zosyn, cipro.\par June 2019 has surgery to remove pelvic/ovarian cysts.  Per surgeon note, pathology revealed benign peritoneal and ovarian cyst.\par \par Noted on incision about 8 weeks after surgery noticed a small "pimple" on her suture line.  \par Pt was on bactrim at this point b/c she had UTI.\par \par Then incision line dehisced and drained pus.  Went back to OBGYN.  Swab was taken and wound was packed and cleaned. She was off antibiotics at this time.  Few weeks later, she was given cipro.  After cipro drainage improved.\par \par Pt went for debridement at that time and had surgery 9/10/2019 at [facility].  Went home with VAC in place. \par \par For 3 days she was doing well and returned to normal but then developed urinary frequency and she did have some diarrhea.  Every time she had her wound vac changed they noted that the packing/foam was soaked with fluid, ?purulence.\par \par Went back and forth to [facility] b/c wound was left open. \par Eventualy wound vac was not put back on. Had home care nurse doing daily wound care changes.\par Drainage again increased and went back to hospital.  \par \par Thought she started to see sesame seeds in the drainage and noted dark pieces of something. \par Eventually this was found to be a fistula.  She was sent back to [facility].\par Culture grew ESBL E coli and pt was placed on contact for this when she went back to surgery.\par She was given antibiotics for this.  \par \par She went for a rectal CT at [facility].  Contrast ended up coming out of her wound and found to have fistula.  Saw IR for intervention but unable to be done. \par \par Pt ultimately reports they found a network of fistulas from rectosigmoid colon to skin and bladder.\par \par She ultimately went to [facility] for surgery consult and had surgery 11/7/19 for loop colostomy.  She found Dr. Rodríguez at [facility] and followed up with him.\par \par She feels much better now.  Still has some drainage from old incision but much less. \par Saw ID in [facility] and was told she didn't need antibiotics.  Discharged 11/9/19.\par \par Doing okay now. No fevers/chills. Still off of antibiotics.\par She has some urinary incontinence. \par Can sleep through the night now though.\luh Has taken probiotics in the past and eats greek yogurt.\par Lost 100 lbs in the past 1 year.\par Still has some drainage at prior incision site but overall is much better.  \par \par She ultimately plans for more definitive surgery possible next summer to repair the fistula.  \par

## 2019-11-20 RX ORDER — IBUPROFEN 800 MG/1
800 TABLET, FILM COATED ORAL
Refills: 0 | Status: DISCONTINUED | COMMUNITY
End: 2019-11-20

## 2019-11-21 ENCOUNTER — APPOINTMENT (OUTPATIENT)
Dept: COLORECTAL SURGERY | Facility: CLINIC | Age: 34
End: 2019-11-21
Payer: MEDICAID

## 2019-11-21 VITALS
DIASTOLIC BLOOD PRESSURE: 82 MMHG | WEIGHT: 259.88 LBS | HEART RATE: 88 BPM | BODY MASS INDEX: 46.05 KG/M2 | SYSTOLIC BLOOD PRESSURE: 146 MMHG | HEIGHT: 63 IN

## 2019-11-21 PROCEDURE — 99024 POSTOP FOLLOW-UP VISIT: CPT

## 2019-12-05 DIAGNOSIS — B36.9 SUPERFICIAL MYCOSIS, UNSPECIFIED: ICD-10-CM

## 2019-12-05 DIAGNOSIS — K63.2 FISTULA OF INTESTINE: ICD-10-CM

## 2019-12-20 ENCOUNTER — APPOINTMENT (OUTPATIENT)
Dept: COLORECTAL SURGERY | Facility: CLINIC | Age: 34
End: 2019-12-20
Payer: MEDICAID

## 2019-12-20 VITALS
WEIGHT: 276.75 LBS | HEIGHT: 63 IN | HEART RATE: 78 BPM | SYSTOLIC BLOOD PRESSURE: 143 MMHG | TEMPERATURE: 97.2 F | DIASTOLIC BLOOD PRESSURE: 85 MMHG | BODY MASS INDEX: 49.04 KG/M2

## 2019-12-20 PROCEDURE — 99024 POSTOP FOLLOW-UP VISIT: CPT

## 2019-12-20 NOTE — PHYSICAL EXAM
[Exam Deferred] : exam was deferred [de-identified] : obese, low transverse incision is open with some output, no abdominal tenderness. stoma is fine (AgNO3 used on granulation) and convexity applied [de-identified] : no masses noted (but hard to exdamine due to obesity)

## 2019-12-20 NOTE — HISTORY OF PRESENT ILLNESS
[FreeTextEntry1] : 6 weeks s/p urgent loop transverse colostomy via midline open apporach for a complex colocutaneous fistula (and multiple side tracks, one of which fistulized to the bladder by imaging but not symptomatically as regards pneumaturia or fecaluria.\par \par Doing well. Eating well.  Has daily fistula output that is lower volume than pre-stoma.  Had been on antibiotics x months but is now off them x over 5 weeks and doing fine minus fever or abdominal pain.\par JOSE Howell is handing the stoma care.\par \par She initially had GYN cystectomy done that led to late wound infection (1.5 months later) anmd then spontaneousl drainage and then fecal drainage.\par

## 2019-12-20 NOTE — ASSESSMENT
[FreeTextEntry1] : Doing ok.\par Would wait at least 6 months prior to attempting fistula takedown and probable colectomy.\par Urged to loose 70 or more pounds.  She is 273 today. Goal is 210 or less.\par She understands that next op will likley be quite difficult and would need to be open procedure.  Also, it might not be doable if pelvic scarring is 4 + bad in all areas.  Stoma likely needed after next op at least temporarily.\par To call for problems.\par Exercise program advised \par Nutritional consult also recommended.

## 2019-12-23 NOTE — ASSESSMENT
[FreeTextEntry1] : Overall doing better. staples d/c'd and vivek removed.  will need convexity. \par will need convexity.   Will have deeper convex sent. \par \par To follow up with Christophe and me in one month. \par call for problems.

## 2019-12-23 NOTE — HISTORY OF PRESENT ILLNESS
[FreeTextEntry1] : Patient with colocutaneous fistula who is now post op  loop colostomy. Discharged on 11/7/2019.  Doing well, denies pain, nausea or vomiting.  No fevers. Fistula output has becomes small amount of mucous only.   Is on no antibiotics, currently has appointment as out patient scheduled with ID.  Doing stoma  care on own.  Is managing a two piece pouch. Does complain of itching and a "rash" on the bottom. \par \par Has seen ID, Dr. Margarita Hernández who is not going to treat with abx at this time.   The patient was advised to take probiotics. \par \par Patient is complaining of pain at the site of the vivek.  Denies fever, nausea or vomiting.  Bowel movements are daily, no issues.  Still using the lurdes 2 piece pouch. \par \par fistula continues to drain, more serous now but still some thicker white discharge.  \par

## 2019-12-23 NOTE — PHYSICAL EXAM
[FreeTextEntry1] : PE:\par \par AOA, looks well\par abdomen: soft, non tender. midline staples clean and dry, well approximated.\par LLQ stoma well formed,  retracts in fold distally, vivek is cutting into patient distally\par peristomal rash is much improved. \par \par Fistula below pannus is draining moderate amounts clear fluid

## 2020-01-06 ENCOUNTER — APPOINTMENT (OUTPATIENT)
Dept: INFECTIOUS DISEASE | Facility: CLINIC | Age: 35
End: 2020-01-06
Payer: MEDICAID

## 2020-01-06 ENCOUNTER — OUTPATIENT (OUTPATIENT)
Dept: OUTPATIENT SERVICES | Facility: HOSPITAL | Age: 35
LOS: 1 days | End: 2020-01-06
Payer: MEDICAID

## 2020-01-06 VITALS
BODY MASS INDEX: 49.96 KG/M2 | RESPIRATION RATE: 14 BRPM | WEIGHT: 282 LBS | TEMPERATURE: 97.2 F | HEART RATE: 80 BPM | DIASTOLIC BLOOD PRESSURE: 77 MMHG | SYSTOLIC BLOOD PRESSURE: 120 MMHG | OXYGEN SATURATION: 100 % | HEIGHT: 63 IN

## 2020-01-06 DIAGNOSIS — Z98.890 OTHER SPECIFIED POSTPROCEDURAL STATES: Chronic | ICD-10-CM

## 2020-01-06 DIAGNOSIS — B97.89 OTHER VIRAL AGENTS AS THE CAUSE OF DISEASES CLASSIFIED ELSEWHERE: ICD-10-CM

## 2020-01-06 PROCEDURE — 99213 OFFICE O/P EST LOW 20 MIN: CPT

## 2020-01-06 PROCEDURE — G0463: CPT

## 2020-01-06 NOTE — REVIEW OF SYSTEMS
[Recent Weight Loss (___ Lbs)] : recent [unfilled] ~Ulb weight loss [As Noted in HPI] : as noted in HPI [Skin Lesions] : skin lesion [Negative] : Heme/Lymph [de-identified] : lower abdomen wound with drainage

## 2020-01-06 NOTE — HISTORY OF PRESENT ILLNESS
[FreeTextEntry1] : 34 F presents for f/up today.\par \par Per first visit:\par H/o possible tubo-ovarian abscess, 2/2019.  She had multiple CT scans from Feb thru April 2019.  \par She had removal of TOA 6/19/2019.  \par \par In Feb 2019 she had faint pain at LLQ.  She was sent to ER and saw OB-GYN.  Admitted to [facility].  CT scan did not visualize colon well.  \par \par She reports she took many different antibiotics during this time: doxy, zosyn, cipro from 2/2019 to 6/2019.\par \par June 2019 has surgery to remove pelvic/ovarian cysts.  Per surgeon note, pathology revealed benign peritoneal and ovarian cyst.\par \par Noted on incision about 8 weeks after surgery -  noticed a small "pimple" on her suture line.  \par Pt was on bactrim at this point b/c she had UTI.\par \par Then incision line dehisced and drained pus.  Went back to OBGYN.  Swab was taken and wound was packed and cleaned. She was off antibiotics at this time.  Few weeks later, she was given cipro.  After cipro drainage improved.\par \par Pt went for debridement at that time and had surgery 9/10/2019 at [facility].  Went home with VAC in place. \par \par For 3 days she was doing well and returned to normal but then developed urinary frequency and she did have some diarrhea.  Every time she had her wound vac changed they noted that the packing/foam was soaked with fluid, ?purulence.\par \par Went back and forth to [facility] b/c wound was left open. \par Eventually wound vac was not put back on. Had home care nurse doing daily wound care changes.\par Drainage again increased and went back to hospital.  \par \par Thought she started to see sesame seeds in the drainage and noted dark pieces of something. \par Eventually this was found to be a fistula.  She was sent back to [facility].\par Culture grew ESBL E coli and pt was placed on contact for this when she went back to surgery.\par She was given antibiotics for this.  \par \par She went for a rectal CT at [facility].  Contrast ended up coming out of her wound and found to have fistula.  Saw IR for intervention but unable to be done. \par \par Pt ultimately reports they found a network of fistulas from rectosigmoid colon to skin and bladder.\par \par She ultimately went to [facility] for surgery consult and had surgery 11/7/19 for loop colostomy. \par \par She feels much better now.  Still has some drainage from old incision but much less. \par Saw ID in [facility] and was told she didn't need antibiotics.  Discharged 11/9/19.\par \par NEW INFO: Visit 1/6/2020\par Doing okay now. No fevers/chills. Still off of antibiotics.  Has not had any antibiotics since 11/9/19.\par She has some urinary incontinence which was increased last week, now returned to normal for her. \par Lost 100 lbs in the past 1 year.  Told she needs to lose more weight. \par Still has some drainage at prior incision site but overall is much better.  \par Denies dysuria. Does see some mucus in urine at times.\par \par She ultimately plans for more definitive surgery possible next summer to repair the fistula.  \par

## 2020-01-06 NOTE — CONSULT LETTER
[Dear  ___] : Dear  [unfilled], [Please see my note below.] : Please see my note below. [Consult Letter:] : I had the pleasure of evaluating your patient, [unfilled]. [Consult Closing:] : Thank you very much for allowing me to participate in the care of this patient.  If you have any questions, please do not hesitate to contact me. [Sincerely,] : Sincerely, [FreeTextEntry2] : Dr. Jerry Saeed [FreeTextEntry3] : \par Margarita Davidson MD\par  of Medicine\par Division of Infectious Diseases\par [facility] [employer] [facility] [employer] [facility]\par [address] [place] [address] [address]\[place] [address]\par Tel: [phone]\par Fax: [phone] [DrUmang  ___] : Dr. CLARKE

## 2020-01-06 NOTE — PHYSICAL EXAM
[General Appearance - Alert] : alert [General Appearance - In No Acute Distress] : in no acute distress [General Appearance - Well-Appearing] : healthy appearing [Sclera] : the sclera and conjunctiva were normal [PERRL With Normal Accommodation] : pupils were equal in size, round, reactive to light [Outer Ear] : the ears and nose were normal in appearance [Oropharynx] : the oropharynx was normal with no thrush [Neck Appearance] : the appearance of the neck was normal [Auscultation Breath Sounds / Voice Sounds] : lungs were clear to auscultation bilaterally [] : no respiratory distress [Heart Rate And Rhythm] : heart rate was normal and rhythm regular [Heart Sounds] : normal S1 and S2 [Heart Sounds Gallop] : no gallops [Murmurs] : no murmurs [Heart Sounds Pericardial Friction Rub] : no pericardial rub [Bowel Sounds] : normal bowel sounds [Abdomen Tenderness] : non-tender [Abdomen Soft] : soft [Cervical Lymph Nodes Enlarged Posterior Bilaterally] : posterior cervical [Supraclavicular Lymph Nodes Enlarged Bilaterally] : supraclavicular [Cervical Lymph Nodes Enlarged Anterior Bilaterally] : anterior cervical [FreeTextEntry1] : see above

## 2020-01-06 NOTE — ASSESSMENT
[FreeTextEntry1] : 34 F h/o possible tubo-ovarian abscess, complicated by enterocutaneous fistula to abdominal wall and bladder, s/p loop colostomy 11/7/19.\par \par Presents today for f/up. Noted increased urinary frequency last week but this resolved on its own.  Denies fevers/chills.  \par \par Overall the patient is feeling better and she has been stable off of antibiotics.\par \par Plans a short trip to [place] next week and wanted to be sure to see before going.  \par \par Will continue to monitor patient. No role for more antibiotics now.\par \par Pt told to call me if develops fevers, dysuria, or other signs of infection.\par \par I suspect her fistula site will continue to drain.\par \par For now I recommended observation.\par I do not think antibiotics will alter this drainage.\par She had a fungal rash in the past and if this returns, she should take the nystatin powder prescribed to her.\par \par Will f/up in 3 months before trip to [place]. \par She can return sooner if needed. \par

## 2020-01-08 DIAGNOSIS — K63.2 FISTULA OF INTESTINE: ICD-10-CM

## 2020-02-27 ENCOUNTER — APPOINTMENT (OUTPATIENT)
Dept: COLORECTAL SURGERY | Facility: CLINIC | Age: 35
End: 2020-02-27
Payer: MEDICAID

## 2020-02-27 VITALS
WEIGHT: 291 LBS | HEIGHT: 63 IN | DIASTOLIC BLOOD PRESSURE: 84 MMHG | HEART RATE: 75 BPM | TEMPERATURE: 98.2 F | BODY MASS INDEX: 51.56 KG/M2 | SYSTOLIC BLOOD PRESSURE: 128 MMHG

## 2020-02-27 PROCEDURE — 99212 OFFICE O/P EST SF 10 MIN: CPT

## 2020-02-27 NOTE — PHYSICAL EXAM
[FreeTextEntry1] : PE:\par \par AOA looks well\par Chest: CTA bilaterally\par abdomen: soft, non tender, midline incision well healed, LLQ stoma with appliance in place, no leakage, no rash. \par fistula site is clean, draining minimally, no rash or skin breakdown.

## 2020-02-27 NOTE — ASSESSMENT
[FreeTextEntry1] : Patient is overall doing much better.  Is getting "her life back".  Knows she must lose weight before doing next surgery to potentially close stoma.  is considering weight reduction surgery. \par to follow up bariatric surgeon and also with us in 1-2 months.  Is follow ing with ID as well.

## 2020-02-27 NOTE — HISTORY OF PRESENT ILLNESS
[FreeTextEntry1] : Patient with colocutaneous fistula who is now post op  loop colostomy. Discharged on 11/7/2019.  Doing well, denies pain, nausea or vomiting.  No fevers.  Doing stoma  care on own.  Is managing a two piece pouch.\par \par Patient is here for follow up.   Doing much better over all, happy.   No issues with ostomy appliance. fistula is still open and draining, but no pain.  Patient does admit to gaining weight ( 9 lbs).  \par

## 2020-03-24 ENCOUNTER — APPOINTMENT (OUTPATIENT)
Dept: SURGERY | Facility: CLINIC | Age: 35
End: 2020-03-24

## 2020-04-20 ENCOUNTER — APPOINTMENT (OUTPATIENT)
Dept: INFECTIOUS DISEASE | Facility: CLINIC | Age: 35
End: 2020-04-20

## 2020-04-27 ENCOUNTER — APPOINTMENT (OUTPATIENT)
Dept: COLORECTAL SURGERY | Facility: CLINIC | Age: 35
End: 2020-04-27

## 2020-08-14 ENCOUNTER — APPOINTMENT (OUTPATIENT)
Dept: SURGERY | Facility: CLINIC | Age: 35
End: 2020-08-14
Payer: MEDICAID

## 2020-08-14 VITALS — WEIGHT: 293 LBS | HEIGHT: 63 IN | BODY MASS INDEX: 51.91 KG/M2

## 2020-09-08 ENCOUNTER — APPOINTMENT (OUTPATIENT)
Dept: SURGERY | Facility: CLINIC | Age: 35
End: 2020-09-08
Payer: MEDICAID

## 2020-09-08 VITALS
DIASTOLIC BLOOD PRESSURE: 82 MMHG | HEART RATE: 84 BPM | TEMPERATURE: 98 F | WEIGHT: 293 LBS | BODY MASS INDEX: 51.91 KG/M2 | HEIGHT: 63 IN | OXYGEN SATURATION: 96 % | SYSTOLIC BLOOD PRESSURE: 137 MMHG

## 2020-09-08 PROCEDURE — 99204 OFFICE O/P NEW MOD 45 MIN: CPT

## 2020-09-16 NOTE — PHYSICAL EXAM
[Obese] : obese [Normal] : normal appearance, no rash, nodules, vesicles, ulcers, erythema [de-identified] : +colostomy bag, +incisions well healed, clean, no signs of infection, +draining pelvic incision

## 2020-09-16 NOTE — ADDENDUM
[FreeTextEntry1] : This note was written by Naheed Roche on 08/28/2020 acting as scribe for MOO Schultz.

## 2020-09-16 NOTE — PLAN
[FreeTextEntry1] : Will begin bariatric work up including a clearance from colorectal doctors and sleep study to rule out any sleep disorders associated with morbid obesity. \par Pt to return in 1 month to meet with  to discuss hx and sx during next weigh in

## 2020-09-16 NOTE — ASSESSMENT
[FreeTextEntry1] : 35 y/o F with h/o obesity and very complex abdominal and pelvic surgical history with colostomy bag currently in place presents today for bariatric consult to lose weight prior to colon resection with Dr. Saeed. Will begin bariatric work up including sleep study to rule out any sleep disorders associated with morbid obesity. Pt was also informed she will need clearance from her colorectal doctors for the operation.\par

## 2020-09-16 NOTE — HISTORY OF PRESENT ILLNESS
[de-identified] : Pt is a 33 y/o F with h/o obesity and enterocutaneous fistula  presents today for bariatric consult with complex surgical history. In February of 2019, pt reported to the ED for severe LLQ pain. Pt states that they called in OBGYN specialists who told her she may have an ovarian cyst and an infection that was causing the pain, although they were unclear on the source of the infection. She was put on abx but the infection did not clear so she had an operation to remove a cyst from her left ovary in June of 2019. Pt states she was doing well for about a month after the operation but then she felt some pelvic pain, fell, and then the incision site of the operation opened up and drained blood and pus. Pt then went to the ER where they packed the wound. Then in September 2019, pt reports she had a debridement in the OR and a wound vac was placed post operatively. VNS was coming to change the dressing and pt reports that during the third visit, pus was draining from the wound so the nurse advised pt to go to the hospital. Pt states that she noticed the drainage and symptoms of discomfort were more severe after consuming solids and that the nurse noticed some undigested food in the drainage. In the hospital, the OBGYN team that was taking care of pt previously brought in a general surgeon and pt had a rectal CT that showed a fistula in the rectosigmoid. Pt ultimately saw colorectal surgeons Dr. Saeed and Dr. Howell who have told her that she needs a colon resection but that she should lose weight first, so patient presents here for bariatric surgery consult.\par \par Reports long standing hx of obesity despite multiple diet and exercise regimens. Pt denies any other medical problems. Denies any other sxhx. 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 Denies alcohol or tobacco use. Pt currently with colostomy in LUQ, denies any issues at the site and is following colorectal. Pt with open draining lower abdominal incision covered with gauze at site of fistula.

## 2020-10-06 ENCOUNTER — APPOINTMENT (OUTPATIENT)
Dept: SURGERY | Facility: CLINIC | Age: 35
End: 2020-10-06
Payer: MEDICAID

## 2020-10-06 PROCEDURE — 99204 OFFICE O/P NEW MOD 45 MIN: CPT | Mod: 95

## 2020-10-06 NOTE — HISTORY OF PRESENT ILLNESS
[Home] : at home, [unfilled] , at the time of the visit. [Medical Office: ([facility])___] : at the medical office located in  [Verbal consent obtained from patient] : the patient, [unfilled] [de-identified] : Pt is a 35 y/o F with h/o obesity and enterocutaneous fistula presents today for bariatric consult with complex surgical history. In February of 2019, pt reported to the ED for severe LLQ pain. Pt states that they called in OBGYN specialists who told her she may have an ovarian cyst and an infection that was causing the pain, although they were unclear on the source of the infection. She was put on abx but the infection did not clear so she had an operation to remove a cyst from her left ovary in June of 2019. Pt states she was doing well for about a month after the operation but then she felt some pelvic pain, fell, and then the incision site of the operation opened up and drained blood and pus. Pt then went to the ER where they packed the wound. Then in September 2019, pt reports she had a debridement in the OR and a wound vac was placed post operatively. VNS was coming to change the dressing and pt reports that during the third visit, pus was draining from the wound so the nurse advised pt to go to the hospital. Pt states that she noticed the drainage and symptoms of discomfort were more severe after consuming solids and that the nurse noticed some undigested food in the drainage. In the hospital, the OBGYN team that was taking care of pt previously brought in a general surgeon and pt had a rectal CT that showed a fistula in the rectosigmoid. Pt ultimately saw colorectal surgeons Dr. Saeed and Dr. Howell who have told her that she needs a colon resection but that she should lose weight first, so patient presents here for bariatric surgery consult.\par \par Reports long standing hx of obesity despite multiple diet and exercise regimens. Pt denies any other medical problems. Denies any other sxhx. 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 Denies alcohol or tobacco use. Pt currently with colostomy in LUQ, denies any issues at the site and is following colorectal. Pt with open draining lower abdominal incision covered with gauze at site of fistula.  [de-identified] : Pt is a 35 y/o F who presents via telehealth today to discuss having a bariatric operation prior to colectomy with Dr. Saeed. She states that she has considered bariatric surgery in the past and wasn't ready at the time but now is, especially because it will help her remedy her other medical problems. Pt states that obesity runs in her family and her sister had a bariatric operation and has lost over 100 lb. Pt states that she has tried multiple diets with no lasting success. Pt denies DM, HTN, cardiac problems. She denies problems urinating. She is not currently on any medication. She reports that she's been told she was allergic to Zosyn because of a reaction she had to an IV but that she was given it recently and is actually not allergic. Pt hadn't been exercising because of her medical condition but walks her dog daily and says that she is starting to play softball again. Pt lives on [address] now but is moving to [facility] soon. Pt is single and does not have any children. She smokes ~6 cigarettes/day but states she is currently quitting. Pt drank socially in the past but doesn't now because of her medical problems.

## 2020-10-06 NOTE — ADDENDUM
[FreeTextEntry1] : This note was written by Naheed Roche on 10/06/2020 acting as scribe for Dr. Chaparro

## 2020-10-06 NOTE — ASSESSMENT
[FreeTextEntry1] : Pt is a 35 y/o F with h/o obesity and enterocutaneous fistula presents today for bariatric consult with complex surgical history. Will begin bariatric work up including sleep study to rule out any sleep disorders associated with morbid obesity. \par \par

## 2020-10-06 NOTE — END OF VISIT
[FreeTextEntry3] : All medical record entries made by the Scribe were at my, Dr. Chaparro's, discretion and personally dictated by me on 10/06/2020. I have reviewed the chart and agree that the record accurately reflects my personal performance of the history, physical exam, assessment and plan. I have also personally directed, reviewed and agreed to the chart.

## 2020-10-06 NOTE — PLAN
[FreeTextEntry1] : Will begin bariatric work up including sleep study to rule out any sleep disorders associated with morbid obesity.

## 2020-10-14 LAB
25(OH)D3 SERPL-MCNC: 17.5 NG/ML
A-TOCOPHEROL VIT E SERPL-MCNC: 8.3 MG/L
ALBUMIN SERPL ELPH-MCNC: 4.6 G/DL
ALP BLD-CCNC: 76 U/L
ALT SERPL-CCNC: 21 U/L
ANION GAP SERPL CALC-SCNC: 14 MMOL/L
APPEARANCE: CLEAR
AST SERPL-CCNC: 20 U/L
BACTERIA: NEGATIVE
BASOPHILS # BLD AUTO: 0.03 K/UL
BASOPHILS NFR BLD AUTO: 0.4 %
BETA+GAMMA TOCOPHEROL SERPL-MCNC: 2.1 MG/L
BILIRUB SERPL-MCNC: 0.5 MG/DL
BILIRUBIN URINE: NEGATIVE
BLOOD URINE: NEGATIVE
BUN SERPL-MCNC: 13 MG/DL
CA-I SERPL-SCNC: 1.21 MMOL/L
CALCIUM SERPL-MCNC: 9.4 MG/DL
CALCIUM SERPL-MCNC: 9.4 MG/DL
CHLORIDE SERPL-SCNC: 100 MMOL/L
CHOLEST SERPL-MCNC: 178 MG/DL
CHOLEST/HDLC SERPL: 4.4 RATIO
CO2 SERPL-SCNC: 25 MMOL/L
COLOR: YELLOW
CREAT SERPL-MCNC: 0.63 MG/DL
EOSINOPHIL # BLD AUTO: 0.17 K/UL
EOSINOPHIL NFR BLD AUTO: 2.4 %
ESTIMATED AVERAGE GLUCOSE: 100 MG/DL
FOLATE SERPL-MCNC: 6.7 NG/ML
GLUCOSE QUALITATIVE U: NEGATIVE
GLUCOSE SERPL-MCNC: 97 MG/DL
HBA1C MFR BLD HPLC: 5.1 %
HCG SERPL QL: NEGATIVE
HCT VFR BLD CALC: 46.6 %
HDLC SERPL-MCNC: 41 MG/DL
HGB BLD-MCNC: 14.7 G/DL
HYALINE CASTS: 3 /LPF
IMM GRANULOCYTES NFR BLD AUTO: 0.6 %
INR PPP: 1.01 RATIO
IRON SATN MFR SERPL: 15 %
IRON SERPL-MCNC: 60 UG/DL
KETONES URINE: NEGATIVE
LDLC SERPL CALC-MCNC: 116 MG/DL
LEUKOCYTE ESTERASE URINE: ABNORMAL
LYMPHOCYTES # BLD AUTO: 1.4 K/UL
LYMPHOCYTES NFR BLD AUTO: 19.4 %
MAN DIFF?: NORMAL
MCHC RBC-ENTMCNC: 28.6 PG
MCHC RBC-ENTMCNC: 31.5 GM/DL
MCV RBC AUTO: 90.7 FL
MICROSCOPIC-UA: NORMAL
MONOCYTES # BLD AUTO: 0.49 K/UL
MONOCYTES NFR BLD AUTO: 6.8 %
NEUTROPHILS # BLD AUTO: 5.08 K/UL
NEUTROPHILS NFR BLD AUTO: 70.4 %
NITRITE URINE: NEGATIVE
PAPP-A SERPL-ACNC: <1 MIU/ML
PARATHYROID HORMONE INTACT: 32 PG/ML
PH URINE: 6.5
PLATELET # BLD AUTO: 202 K/UL
POTASSIUM SERPL-SCNC: 4.8 MMOL/L
PREALB SERPL NEPH-MCNC: 20 MG/DL
PROT SERPL-MCNC: 7.9 G/DL
PROTEIN URINE: ABNORMAL
PT BLD: 11.9 SEC
RBC # BLD: 5.14 M/UL
RBC # FLD: 13.5 %
RED BLOOD CELLS URINE: 1 /HPF
SODIUM SERPL-SCNC: 138 MMOL/L
SPECIFIC GRAVITY URINE: 1.02
SQUAMOUS EPITHELIAL CELLS: 14 /HPF
TIBC SERPL-MCNC: 405 UG/DL
TRIGL SERPL-MCNC: 107 MG/DL
TSH SERPL-ACNC: 1.16 UIU/ML
UIBC SERPL-MCNC: 345 UG/DL
UROBILINOGEN URINE: NORMAL
VIT A SERPL-MCNC: 33.4 UG/DL
VIT B1 SERPL-MCNC: 170 NMOL/L
VIT B12 SERPL-MCNC: 600 PG/ML
WBC # FLD AUTO: 7.21 K/UL
WHITE BLOOD CELLS URINE: 15 /HPF
ZINC SERPL-MCNC: 59 UG/DL

## 2020-10-15 VITALS — BODY MASS INDEX: 51.91 KG/M2 | WEIGHT: 293 LBS | HEIGHT: 63 IN

## 2020-11-10 ENCOUNTER — NON-APPOINTMENT (OUTPATIENT)
Age: 35
End: 2020-11-10

## 2020-11-13 VITALS — WEIGHT: 283.56 LBS | BODY MASS INDEX: 50.24 KG/M2 | HEIGHT: 63 IN

## 2020-12-25 VITALS — HEIGHT: 63 IN | WEIGHT: 282.5 LBS | BODY MASS INDEX: 50.05 KG/M2

## 2021-01-18 ENCOUNTER — APPOINTMENT (OUTPATIENT)
Dept: BARIATRICS | Facility: CLINIC | Age: 36
End: 2021-01-18
Payer: MEDICAID

## 2021-01-18 PROCEDURE — 97802 MEDICAL NUTRITION INDIV IN: CPT

## 2021-01-18 PROCEDURE — 99072 ADDL SUPL MATRL&STAF TM PHE: CPT

## 2021-01-22 ENCOUNTER — OUTPATIENT (OUTPATIENT)
Dept: OUTPATIENT SERVICES | Facility: HOSPITAL | Age: 36
LOS: 1 days | End: 2021-01-22
Payer: MEDICAID

## 2021-01-22 ENCOUNTER — APPOINTMENT (OUTPATIENT)
Dept: PULMONOLOGY | Facility: CLINIC | Age: 36
End: 2021-01-22
Payer: MEDICAID

## 2021-01-22 ENCOUNTER — APPOINTMENT (OUTPATIENT)
Dept: COLORECTAL SURGERY | Facility: CLINIC | Age: 36
End: 2021-01-22
Payer: MEDICAID

## 2021-01-22 VITALS
HEART RATE: 57 BPM | BODY MASS INDEX: 51.91 KG/M2 | HEIGHT: 63 IN | DIASTOLIC BLOOD PRESSURE: 78 MMHG | OXYGEN SATURATION: 98 % | TEMPERATURE: 97.6 F | SYSTOLIC BLOOD PRESSURE: 128 MMHG | WEIGHT: 293 LBS

## 2021-01-22 VITALS
OXYGEN SATURATION: 98 % | SYSTOLIC BLOOD PRESSURE: 124 MMHG | BODY MASS INDEX: 51.91 KG/M2 | TEMPERATURE: 98.1 F | HEIGHT: 63 IN | WEIGHT: 293 LBS | HEART RATE: 66 BPM | DIASTOLIC BLOOD PRESSURE: 79 MMHG

## 2021-01-22 DIAGNOSIS — Z98.890 OTHER SPECIFIED POSTPROCEDURAL STATES: Chronic | ICD-10-CM

## 2021-01-22 PROCEDURE — 71046 X-RAY EXAM CHEST 2 VIEWS: CPT

## 2021-01-22 PROCEDURE — 99072 ADDL SUPL MATRL&STAF TM PHE: CPT

## 2021-01-22 PROCEDURE — 71046 X-RAY EXAM CHEST 2 VIEWS: CPT | Mod: 26

## 2021-01-22 PROCEDURE — 99204 OFFICE O/P NEW MOD 45 MIN: CPT

## 2021-01-22 PROCEDURE — 99213 OFFICE O/P EST LOW 20 MIN: CPT

## 2021-01-26 NOTE — HISTORY OF PRESENT ILLNESS
[TextBox_4] : 35 year old female, current smoker with obesity was referred to pulmonary clinic by Dr. Chaparro for pulmonary evaluation before bariatric surgery. \par Patient denies cough, SOB, chest pain or pulmonary illness in last 1 year. She is able to go three flights of stairs without any difficulty in breathing. Patient c/o snoring occasionally but no witnessed apnea, early morning headache or choking at night time or excessive daytime sleepiness. ESS score is 4.\par

## 2021-01-26 NOTE — DISCUSSION/SUMMARY
[FreeTextEntry1] : 35 year old female with obesity for preop Pulmonary evaluation before bariatric surgery\par \par Review:\par Bariatric surgery note\par Labs\par \par Plan:\par - CXR, PFT\par - Sleep study\par - Follow up PFt and sleep study\par

## 2021-01-26 NOTE — REVIEW OF SYSTEMS
[Fever] : no fever [Chills] : no chills [Dry Eyes] : no dry eyes [Eye Irritation] : no eye irritation [Sinus Problems] : no sinus problems [Cough] : no cough [Sputum] : no sputum [TextBox_151] : rest of ROS negative except in HPI

## 2021-01-26 NOTE — PHYSICAL EXAM
[No Acute Distress] : no acute distress [Well Nourished] : well nourished [Normal Oropharynx] : normal oropharynx [II] : Mallampati Class: II [Normal Appearance] : normal appearance [No Neck Mass] : no neck mass [Normal Rate/Rhythm] : normal rate/rhythm [Normal S1, S2] : normal s1, s2 [No Resp Distress] : no resp distress [Clear to Auscultation Bilaterally] : clear to auscultation bilaterally [Benign] : benign [Not Tender] : not tender [No Clubbing] : no clubbing [No Edema] : no edema [Normal Color/ Pigmentation] : normal color/ pigmentation [No Rash] : no rash [No Focal Deficits] : no focal deficits [No Sensory Deficits] : no sensory deficits [Oriented x3] : oriented x3 [Normal Affect] : normal affect

## 2021-01-26 NOTE — CONSULT LETTER
[Dear  ___] : Dear  [unfilled], [Consult Letter:] : I had the pleasure of evaluating your patient, [unfilled]. [Please see my note below.] : Please see my note below. [Consult Closing:] : Thank you very much for allowing me to participate in the care of this patient.  If you have any questions, please do not hesitate to contact me. [FreeTextEntry3] : Sincerely\par \par David Greenberg MD [facility]\par , [employer] [facility] [employer]\par Associate , Pulmonary and Critical Care Fellowship\par Pulmonary and Critical Care\par [facility]\par Phone: [phone]\par

## 2021-01-27 DIAGNOSIS — E55.9 VITAMIN D DEFICIENCY, UNSPECIFIED: ICD-10-CM

## 2021-01-27 LAB
25(OH)D3 SERPL-MCNC: 11.2 NG/ML
ALBUMIN SERPL ELPH-MCNC: 4.4 G/DL
ALP BLD-CCNC: 74 U/L
ALT SERPL-CCNC: 13 U/L
ANION GAP SERPL CALC-SCNC: 13 MMOL/L
APPEARANCE: ABNORMAL
AST SERPL-CCNC: 12 U/L
BACTERIA: ABNORMAL
BASOPHILS # BLD AUTO: 0.02 K/UL
BASOPHILS NFR BLD AUTO: 0.3 %
BILIRUB SERPL-MCNC: 0.4 MG/DL
BILIRUBIN URINE: NEGATIVE
BLOOD URINE: ABNORMAL
BUN SERPL-MCNC: 15 MG/DL
CA-I SERPL-SCNC: 1.2 MMOL/L
CALCIUM SERPL-MCNC: 9 MG/DL
CALCIUM SERPL-MCNC: 9 MG/DL
CHLORIDE SERPL-SCNC: 102 MMOL/L
CHOLEST SERPL-MCNC: 177 MG/DL
CO2 SERPL-SCNC: 24 MMOL/L
COLOR: ABNORMAL
CREAT SERPL-MCNC: 0.7 MG/DL
EOSINOPHIL # BLD AUTO: 0.28 K/UL
EOSINOPHIL NFR BLD AUTO: 3.9 %
ESTIMATED AVERAGE GLUCOSE: 91 MG/DL
FOLATE SERPL-MCNC: 6.7 NG/ML
GLUCOSE QUALITATIVE U: NEGATIVE
GLUCOSE SERPL-MCNC: 82 MG/DL
HBA1C MFR BLD HPLC: 4.8 %
HCG SERPL QL: NEGATIVE
HCT VFR BLD CALC: 43.9 %
HDLC SERPL-MCNC: 47 MG/DL
HGB BLD-MCNC: 14.3 G/DL
HYALINE CASTS: 0 /LPF
IMM GRANULOCYTES NFR BLD AUTO: 0.4 %
INR PPP: 1.01 RATIO
IRON SATN MFR SERPL: 12 %
IRON SERPL-MCNC: 48 UG/DL
KETONES URINE: NEGATIVE
LDLC SERPL CALC-MCNC: 112 MG/DL
LEUKOCYTE ESTERASE URINE: ABNORMAL
LYMPHOCYTES # BLD AUTO: 1.79 K/UL
LYMPHOCYTES NFR BLD AUTO: 24.7 %
MAN DIFF?: NORMAL
MCHC RBC-ENTMCNC: 28.5 PG
MCHC RBC-ENTMCNC: 32.6 GM/DL
MCV RBC AUTO: 87.6 FL
MICROSCOPIC-UA: NORMAL
MONOCYTES # BLD AUTO: 0.37 K/UL
MONOCYTES NFR BLD AUTO: 5.1 %
NEUTROPHILS # BLD AUTO: 4.75 K/UL
NEUTROPHILS NFR BLD AUTO: 65.6 %
NITRITE URINE: NEGATIVE
NONHDLC SERPL-MCNC: 131 MG/DL
PAPP-A SERPL-ACNC: <1 MIU/ML
PARATHYROID HORMONE INTACT: 60 PG/ML
PH URINE: 6
PLATELET # BLD AUTO: 173 K/UL
POTASSIUM SERPL-SCNC: 4.5 MMOL/L
PREALB SERPL NEPH-MCNC: 22 MG/DL
PROT SERPL-MCNC: 7.7 G/DL
PROTEIN URINE: ABNORMAL
PT BLD: 12 SEC
RBC # BLD: 5.01 M/UL
RBC # FLD: 13.2 %
RED BLOOD CELLS URINE: 11 /HPF
SODIUM SERPL-SCNC: 139 MMOL/L
SPECIFIC GRAVITY URINE: 1.02
SQUAMOUS EPITHELIAL CELLS: 25 /HPF
TIBC SERPL-MCNC: 390 UG/DL
TRIGL SERPL-MCNC: 95 MG/DL
TSH SERPL-ACNC: 1.07 UIU/ML
UIBC SERPL-MCNC: 341 UG/DL
UREA BREATH TEST QL: NEGATIVE
UROBILINOGEN URINE: NORMAL
VIT A SERPL-MCNC: 39.1 UG/DL
VIT B12 SERPL-MCNC: 439 PG/ML
WBC # FLD AUTO: 7.24 K/UL
WHITE BLOOD CELLS URINE: 76 /HPF
ZINC SERPL-MCNC: 112 UG/DL

## 2021-02-01 ENCOUNTER — OUTPATIENT (OUTPATIENT)
Dept: OUTPATIENT SERVICES | Facility: HOSPITAL | Age: 36
LOS: 1 days | End: 2021-02-01
Payer: MEDICAID

## 2021-02-01 ENCOUNTER — APPOINTMENT (OUTPATIENT)
Dept: SLEEP CENTER | Facility: HOME HEALTH | Age: 36
End: 2021-02-01
Payer: MEDICAID

## 2021-02-01 DIAGNOSIS — Z98.890 OTHER SPECIFIED POSTPROCEDURAL STATES: Chronic | ICD-10-CM

## 2021-02-01 LAB
A-TOCOPHEROL VIT E SERPL-MCNC: 9.2 MG/L
BETA+GAMMA TOCOPHEROL SERPL-MCNC: 2.9 MG/L
VIT B1 SERPL-MCNC: 171 NMOL/L

## 2021-02-01 PROCEDURE — 95800 SLP STDY UNATTENDED: CPT

## 2021-02-01 PROCEDURE — 95800 SLP STDY UNATTENDED: CPT | Mod: 26

## 2021-02-02 ENCOUNTER — RX RENEWAL (OUTPATIENT)
Age: 36
End: 2021-02-02

## 2021-02-02 ENCOUNTER — TRANSCRIPTION ENCOUNTER (OUTPATIENT)
Age: 36
End: 2021-02-02

## 2021-02-02 DIAGNOSIS — G47.33 OBSTRUCTIVE SLEEP APNEA (ADULT) (PEDIATRIC): ICD-10-CM

## 2021-02-04 ENCOUNTER — APPOINTMENT (OUTPATIENT)
Dept: PULMONOLOGY | Facility: CLINIC | Age: 36
End: 2021-02-04
Payer: MEDICAID

## 2021-02-04 DIAGNOSIS — Z01.811 ENCOUNTER FOR PREPROCEDURAL RESPIRATORY EXAMINATION: ICD-10-CM

## 2021-02-04 PROCEDURE — 99443: CPT

## 2021-02-05 PROBLEM — Z01.811 PREOP PULMONARY/RESPIRATORY EXAM: Status: ACTIVE | Noted: 2021-01-22

## 2021-02-05 NOTE — HISTORY OF PRESENT ILLNESS
[Home] : at home, [unfilled] , at the time of the visit. [Medical Office: ([facility])___] : at the medical office located in  [Verbal consent obtained from patient] : the patient, [unfilled] [TextBox_4] : 35 year old female, current smoker with obesity was referred to pulmonary clinic by Dr. Chaparro for pulmonary evaluation before bariatric surgery. \par Patient denies cough, SOB, chest pain or pulmonary illness in last 1 year. She is able to go three flights of stairs without any difficulty in breathing. Patient c/o snoring occasionally but no witnessed apnea, early morning headache or choking at night time or excessive daytime sleepiness. ESS score is 4.\par \par 2/4/21:\par Patient has not got PFt done so far. Sleep study is done. Call to discuss the results and further management.

## 2021-02-05 NOTE — CONSULT LETTER
[Dear  ___] : Dear  [unfilled], [Consult Letter:] : I had the pleasure of evaluating your patient, [unfilled]. [Please see my note below.] : Please see my note below. [Consult Closing:] : Thank you very much for allowing me to participate in the care of this patient.  If you have any questions, please do not hesitate to contact me. [FreeTextEntry3] : Sincerely\par \par David Greenberg MD [facility]\par , [facility] [employer]\par Associate , Pulmonary and Critical Care Fellowship\par Pulmonary and Critical Care\par [facility]\par Phone: [phone]\par

## 2021-02-05 NOTE — DISCUSSION/SUMMARY
[FreeTextEntry1] : 35 year old female with obesity for Preop Pulmonary evaluation before bariatric surgery\par \par Review:\par Bariatric surgery note\par Labs\par Sleep Study (2/21): Mild sleep apnea. AHI 14.5\par \par A/P\par Patient has good functional status. She has mild SOB on exertion.She denied history of previous pulmonary illness or admission to hospital for pulmonary illness. Pulmonary exam was normal with no hypoxia on ambulation. CXR did not reveal any acute parenchymal abnormality. Sleep study showed mild sleep apnea.\par Pre-Op Pulmonary examination:\par Patient may undergo bariatric surgery. Her ARISCAT score 18. She has low risk (1.6% risk) of in-hospital post-op pulmonary complications (composite including respiratory failure, respiratory infection, pleural effusion, atelectasis, pneumothorax, bronchospasm, aspiration pneumonitis).\par Recommendation:\par - Avoid Opioids during surgery\par - Extubate in upright position\par - Would recommend to extubate once patient is awake\par - BIPAP may be required after extubation\par \par Patient will follow up after 6 months and may need repeat sleep study as her AHI was near to moderatre sleep apnea criteria. \par \par

## 2021-02-07 ENCOUNTER — APPOINTMENT (OUTPATIENT)
Dept: DISASTER EMERGENCY | Facility: CLINIC | Age: 36
End: 2021-02-07

## 2021-02-07 DIAGNOSIS — Z01.818 ENCOUNTER FOR OTHER PREPROCEDURAL EXAMINATION: ICD-10-CM

## 2021-02-07 LAB — SARS-COV-2 N GENE NPH QL NAA+PROBE: NOT DETECTED

## 2021-02-10 ENCOUNTER — APPOINTMENT (OUTPATIENT)
Dept: PULMONOLOGY | Facility: CLINIC | Age: 36
End: 2021-02-10
Payer: MEDICAID

## 2021-02-10 PROCEDURE — 99072 ADDL SUPL MATRL&STAF TM PHE: CPT

## 2021-02-10 PROCEDURE — 94729 DIFFUSING CAPACITY: CPT

## 2021-02-10 PROCEDURE — 94726 PLETHYSMOGRAPHY LUNG VOLUMES: CPT

## 2021-02-10 PROCEDURE — 94010 BREATHING CAPACITY TEST: CPT

## 2021-03-16 ENCOUNTER — APPOINTMENT (OUTPATIENT)
Dept: SURGERY | Facility: CLINIC | Age: 36
End: 2021-03-16
Payer: MEDICAID

## 2021-03-16 VITALS
HEIGHT: 63 IN | HEART RATE: 52 BPM | DIASTOLIC BLOOD PRESSURE: 82 MMHG | WEIGHT: 293 LBS | OXYGEN SATURATION: 98 % | TEMPERATURE: 97.8 F | BODY MASS INDEX: 51.91 KG/M2 | SYSTOLIC BLOOD PRESSURE: 129 MMHG

## 2021-03-16 DIAGNOSIS — E66.01 MORBID (SEVERE) OBESITY DUE TO EXCESS CALORIES: ICD-10-CM

## 2021-03-16 PROCEDURE — 99072 ADDL SUPL MATRL&STAF TM PHE: CPT

## 2021-03-16 PROCEDURE — 99212 OFFICE O/P EST SF 10 MIN: CPT

## 2021-03-16 NOTE — END OF VISIT
[FreeTextEntry3] : All medical record entries made by the Scribe were at my, MOO Schultz, discretion and personally dictated by me on 03/16/2021 . I have reviewed the chart and agree that the record accurately reflects my personal performance of the history, physical exam, assessment and plan. I have also personally directed, reviewed and agreed to the chart.

## 2021-03-16 NOTE — ADDENDUM
[FreeTextEntry1] : This note was written by Naheed Roche on 03/16/2021 acting as scribe for MOO Schultz.

## 2021-03-16 NOTE — HISTORY OF PRESENT ILLNESS
[de-identified] : Pt is a 33 y/o F with h/o obesity and enterocutaneous fistula presents today for bariatric consult with complex surgical history. In February of 2019, pt reported to the ED for severe LLQ pain. Pt states that they called in OBGYN specialists who told her she may have an ovarian cyst and an infection that was causing the pain, although they were unclear on the source of the infection. She was put on abx but the infection did not clear so she had an operation to remove a cyst from her left ovary in June of 2019. Pt states she was doing well for about a month after the operation but then she felt some pelvic pain, fell, and then the incision site of the operation opened up and drained blood and pus. Pt then went to the ER where they packed the wound. Then in September 2019, pt reports she had a debridement in the OR and a wound vac was placed post operatively. VNS was coming to change the dressing and pt reports that during the third visit, pus was draining from the wound so the nurse advised pt to go to the hospital. Pt states that she noticed the drainage and symptoms of discomfort were more severe after consuming solids and that the nurse noticed some undigested food in the drainage. In the hospital, the OBGYN team that was taking care of pt previously brought in a general surgeon and pt had a rectal CT that showed a fistula in the rectosigmoid. Pt ultimately saw colorectal surgeons Dr. Saeed and Dr. Howell who have told her that she needs a colon resection but that she should lose weight first, so patient presents here for bariatric surgery consult. [de-identified] : Pt is a 34 y/o F with BMI 53 who presents today feeling well. She states she stopped smoking over 2 months ago. Pt reports she has completed her bariatric workup and is ready to schedule surgery. Pt states her incision is still open and drains slightly.

## 2021-03-19 ENCOUNTER — APPOINTMENT (OUTPATIENT)
Dept: SURGERY | Facility: CLINIC | Age: 36
End: 2021-03-19

## 2021-04-09 ENCOUNTER — OUTPATIENT (OUTPATIENT)
Dept: OUTPATIENT SERVICES | Facility: HOSPITAL | Age: 36
LOS: 1 days | End: 2021-04-09
Payer: MEDICAID

## 2021-04-09 ENCOUNTER — APPOINTMENT (OUTPATIENT)
Dept: ULTRASOUND IMAGING | Facility: HOSPITAL | Age: 36
End: 2021-04-09
Payer: MEDICAID

## 2021-04-09 ENCOUNTER — APPOINTMENT (OUTPATIENT)
Dept: RADIOLOGY | Facility: HOSPITAL | Age: 36
End: 2021-04-09
Payer: MEDICAID

## 2021-04-09 DIAGNOSIS — Z98.890 OTHER SPECIFIED POSTPROCEDURAL STATES: Chronic | ICD-10-CM

## 2021-04-09 PROCEDURE — 74240 X-RAY XM UPR GI TRC 1CNTRST: CPT

## 2021-04-09 PROCEDURE — 76700 US EXAM ABDOM COMPLETE: CPT | Mod: 26

## 2021-04-09 PROCEDURE — 74240 X-RAY XM UPR GI TRC 1CNTRST: CPT | Mod: 26

## 2021-04-09 PROCEDURE — 76700 US EXAM ABDOM COMPLETE: CPT

## 2021-04-16 ENCOUNTER — APPOINTMENT (OUTPATIENT)
Dept: SURGERY | Facility: CLINIC | Age: 36
End: 2021-04-16
Payer: MEDICAID

## 2021-04-16 VITALS
DIASTOLIC BLOOD PRESSURE: 77 MMHG | WEIGHT: 293 LBS | SYSTOLIC BLOOD PRESSURE: 118 MMHG | BODY MASS INDEX: 51.91 KG/M2 | HEART RATE: 67 BPM | OXYGEN SATURATION: 97 % | HEIGHT: 63 IN | TEMPERATURE: 97.1 F

## 2021-04-16 PROCEDURE — 99072 ADDL SUPL MATRL&STAF TM PHE: CPT

## 2021-04-16 PROCEDURE — 99213 OFFICE O/P EST LOW 20 MIN: CPT

## 2021-04-16 NOTE — END OF VISIT
[FreeTextEntry3] : All medical record entries made by the Scribe were at my, Dr. Chaparro's, discretion and personally dictated by me on 04/16/2021. I have reviewed the chart and agree that the record accurately reflects my personal performance of the history, physical exam, assessment and plan. I have also personally directed, reviewed and agreed to the chart.

## 2021-04-16 NOTE — ADDENDUM
[FreeTextEntry1] : This note was written by Naheed Roche on 04/16/2021 acting as scribe for Dr. Chaparro

## 2021-04-16 NOTE — HISTORY OF PRESENT ILLNESS
[de-identified] : Pt is a 36 y/o F with h/o obesity and enterocutaneous fistula presents today for bariatric consult with complex surgical history. In February of 2019, pt reported to the ED for severe LLQ pain. Pt states that they called in OBGYN specialists who told her she may have an ovarian cyst and an infection that was causing the pain, although they were unclear on the source of the infection. She was put on abx but the infection did not clear so she had an operation to remove a cyst from her left ovary in June of 2019. Pt states she was doing well for about a month after the operation but then she felt some pelvic pain, fell, and then the incision site of the operation opened up and drained blood and pus. Pt then went to the ER where they packed the wound. Then in September 2019, pt reports she had a debridement in the OR and a wound vac was placed post operatively. VNS was coming to change the dressing and pt reports that during the third visit, pus was draining from the wound so the nurse advised pt to go to the hospital. Pt states that she noticed the drainage and symptoms of discomfort were more severe after consuming solids and that the nurse noticed some undigested food in the drainage. In the hospital, the OBGYN team that was taking care of pt previously brought in a general surgeon and pt had a rectal CT that showed a fistula in the rectosigmoid. Pt ultimately saw colorectal surgeons Dr. Saeed and Dr. Howell who have told her that she needs a colon resection but that she should lose weight first, so patient presents here for bariatric surgery consult.  [de-identified] : Pt presents today doing well. States she is ready for surgery and will see her PCP to do the final surgical clearances.

## 2021-04-16 NOTE — ASSESSMENT
[FreeTextEntry1] : Pt is a 34 y/o obese F with complicated medical/surgical history and colostomy bag in place who presents for final bariatric visit. The risks, benefits, and alternatives to the proposed procedure were discussed with the patient and all questions were answered to their satisfaction. Will schedule VSG.

## 2021-04-16 NOTE — PHYSICAL EXAM
[Obese] : obese [Normal] : affect appropriate [de-identified] : normal respiration [de-identified] : colostomy bag

## 2021-04-17 ENCOUNTER — OUTPATIENT (OUTPATIENT)
Dept: OUTPATIENT SERVICES | Facility: HOSPITAL | Age: 36
LOS: 1 days | End: 2021-04-17
Payer: MEDICAID

## 2021-04-17 DIAGNOSIS — Z01.818 ENCOUNTER FOR OTHER PREPROCEDURAL EXAMINATION: ICD-10-CM

## 2021-04-17 DIAGNOSIS — Z98.890 OTHER SPECIFIED POSTPROCEDURAL STATES: Chronic | ICD-10-CM

## 2021-04-17 LAB
A1C WITH ESTIMATED AVERAGE GLUCOSE RESULT: 5 % — SIGNIFICANT CHANGE UP (ref 4–5.6)
ALBUMIN SERPL ELPH-MCNC: 4.3 G/DL — SIGNIFICANT CHANGE UP (ref 3.3–5)
ALP SERPL-CCNC: 73 U/L — SIGNIFICANT CHANGE UP (ref 40–120)
ALT FLD-CCNC: 14 U/L — SIGNIFICANT CHANGE UP (ref 10–45)
ANION GAP SERPL CALC-SCNC: 8 MMOL/L — SIGNIFICANT CHANGE UP (ref 5–17)
APPEARANCE UR: ABNORMAL
APTT BLD: 33.7 SEC — SIGNIFICANT CHANGE UP (ref 27.5–35.5)
AST SERPL-CCNC: 14 U/L — SIGNIFICANT CHANGE UP (ref 10–40)
BACTERIA # UR AUTO: PRESENT /HPF
BASOPHILS # BLD AUTO: 0.03 K/UL — SIGNIFICANT CHANGE UP (ref 0–0.2)
BASOPHILS NFR BLD AUTO: 0.4 % — SIGNIFICANT CHANGE UP (ref 0–2)
BILIRUB SERPL-MCNC: 0.3 MG/DL — SIGNIFICANT CHANGE UP (ref 0.2–1.2)
BILIRUB UR-MCNC: NEGATIVE — SIGNIFICANT CHANGE UP
BUN SERPL-MCNC: 13 MG/DL — SIGNIFICANT CHANGE UP (ref 7–23)
CALCIUM SERPL-MCNC: 9 MG/DL — SIGNIFICANT CHANGE UP (ref 8.4–10.5)
CHLORIDE SERPL-SCNC: 102 MMOL/L — SIGNIFICANT CHANGE UP (ref 96–108)
CO2 SERPL-SCNC: 29 MMOL/L — SIGNIFICANT CHANGE UP (ref 22–31)
COLOR SPEC: ABNORMAL
CREAT SERPL-MCNC: 0.67 MG/DL — SIGNIFICANT CHANGE UP (ref 0.5–1.3)
DIFF PNL FLD: ABNORMAL
EOSINOPHIL # BLD AUTO: 0.42 K/UL — SIGNIFICANT CHANGE UP (ref 0–0.5)
EOSINOPHIL NFR BLD AUTO: 5.8 % — SIGNIFICANT CHANGE UP (ref 0–6)
ESTIMATED AVERAGE GLUCOSE: 97 MG/DL — SIGNIFICANT CHANGE UP (ref 68–114)
GLUCOSE SERPL-MCNC: 95 MG/DL — SIGNIFICANT CHANGE UP (ref 70–99)
GLUCOSE UR QL: NEGATIVE — SIGNIFICANT CHANGE UP
HCG UR QL: NEGATIVE — SIGNIFICANT CHANGE UP
HCT VFR BLD CALC: 43.7 % — SIGNIFICANT CHANGE UP (ref 34.5–45)
HGB BLD-MCNC: 14.3 G/DL — SIGNIFICANT CHANGE UP (ref 11.5–15.5)
IMM GRANULOCYTES NFR BLD AUTO: 0.6 % — SIGNIFICANT CHANGE UP (ref 0–1.5)
INR BLD: 0.99 — SIGNIFICANT CHANGE UP (ref 0.88–1.16)
KETONES UR-MCNC: NEGATIVE — SIGNIFICANT CHANGE UP
LEUKOCYTE ESTERASE UR-ACNC: ABNORMAL
LYMPHOCYTES # BLD AUTO: 1.6 K/UL — SIGNIFICANT CHANGE UP (ref 1–3.3)
LYMPHOCYTES # BLD AUTO: 22.2 % — SIGNIFICANT CHANGE UP (ref 13–44)
MCHC RBC-ENTMCNC: 28.6 PG — SIGNIFICANT CHANGE UP (ref 27–34)
MCHC RBC-ENTMCNC: 32.7 GM/DL — SIGNIFICANT CHANGE UP (ref 32–36)
MCV RBC AUTO: 87.4 FL — SIGNIFICANT CHANGE UP (ref 80–100)
MONOCYTES # BLD AUTO: 0.44 K/UL — SIGNIFICANT CHANGE UP (ref 0–0.9)
MONOCYTES NFR BLD AUTO: 6.1 % — SIGNIFICANT CHANGE UP (ref 2–14)
NEUTROPHILS # BLD AUTO: 4.69 K/UL — SIGNIFICANT CHANGE UP (ref 1.8–7.4)
NEUTROPHILS NFR BLD AUTO: 64.9 % — SIGNIFICANT CHANGE UP (ref 43–77)
NITRITE UR-MCNC: POSITIVE
NRBC # BLD: 0 /100 WBCS — SIGNIFICANT CHANGE UP (ref 0–0)
PH UR: 6.5 — SIGNIFICANT CHANGE UP (ref 5–8)
PLATELET # BLD AUTO: 189 K/UL — SIGNIFICANT CHANGE UP (ref 150–400)
POTASSIUM SERPL-MCNC: 4.6 MMOL/L — SIGNIFICANT CHANGE UP (ref 3.5–5.3)
POTASSIUM SERPL-SCNC: 4.6 MMOL/L — SIGNIFICANT CHANGE UP (ref 3.5–5.3)
PROT SERPL-MCNC: 7.6 G/DL — SIGNIFICANT CHANGE UP (ref 6–8.3)
PROT UR-MCNC: 100 MG/DL
PROTHROM AB SERPL-ACNC: 11.9 SEC — SIGNIFICANT CHANGE UP (ref 10.6–13.6)
RBC # BLD: 5 M/UL — SIGNIFICANT CHANGE UP (ref 3.8–5.2)
RBC # FLD: 13.5 % — SIGNIFICANT CHANGE UP (ref 10.3–14.5)
RBC CASTS # UR COMP ASSIST: ABNORMAL /HPF
SODIUM SERPL-SCNC: 139 MMOL/L — SIGNIFICANT CHANGE UP (ref 135–145)
SP GR SPEC: 1.02 — SIGNIFICANT CHANGE UP (ref 1–1.03)
T4 FREE+ TSH PNL SERPL: 1.09 UIU/ML — SIGNIFICANT CHANGE UP (ref 0.27–4.2)
UROBILINOGEN FLD QL: 0.2 E.U./DL — SIGNIFICANT CHANGE UP
WBC # BLD: 7.22 K/UL — SIGNIFICANT CHANGE UP (ref 3.8–10.5)
WBC # FLD AUTO: 7.22 K/UL — SIGNIFICANT CHANGE UP (ref 3.8–10.5)
WBC UR QL: < 5 /HPF — SIGNIFICANT CHANGE UP

## 2021-04-17 PROCEDURE — 81025 URINE PREGNANCY TEST: CPT

## 2021-04-17 PROCEDURE — 84443 ASSAY THYROID STIM HORMONE: CPT

## 2021-04-17 PROCEDURE — 93005 ELECTROCARDIOGRAM TRACING: CPT

## 2021-04-17 PROCEDURE — 83036 HEMOGLOBIN GLYCOSYLATED A1C: CPT

## 2021-04-17 PROCEDURE — 85610 PROTHROMBIN TIME: CPT

## 2021-04-17 PROCEDURE — 80053 COMPREHEN METABOLIC PANEL: CPT

## 2021-04-17 PROCEDURE — 93010 ELECTROCARDIOGRAM REPORT: CPT

## 2021-04-17 PROCEDURE — 85730 THROMBOPLASTIN TIME PARTIAL: CPT

## 2021-04-17 PROCEDURE — 87086 URINE CULTURE/COLONY COUNT: CPT

## 2021-04-17 PROCEDURE — 81001 URINALYSIS AUTO W/SCOPE: CPT

## 2021-04-17 PROCEDURE — 85025 COMPLETE CBC W/AUTO DIFF WBC: CPT

## 2021-04-18 LAB
CULTURE RESULTS: SIGNIFICANT CHANGE UP
SPECIMEN SOURCE: SIGNIFICANT CHANGE UP

## 2021-04-22 ENCOUNTER — NON-APPOINTMENT (OUTPATIENT)
Age: 36
End: 2021-04-22

## 2021-04-22 NOTE — ADDENDUM
[FreeTextEntry1] : April 22, 2021\par Patient is scheduled in the near future for bariatric surgery.  We are being asked for documentation for the insurance company stating that this surgery is ok to do prior tot he surgery for the abdominal fistula.\par \par Dr. Saeed and Dr. Herzog have discussed this.  We cannot do both surgeries together.  It is reasonable to complete the bariatric surgery prior to the fistula surgery while she is still diverted.  Bariatric surgery with presumed ensuing weight loss will actually make surgery for the abdominal fistula less complicated.

## 2021-04-22 NOTE — HISTORY OF PRESENT ILLNESS
[FreeTextEntry1] : 35 year old female with colocutaneous fistula s/p transverse loop colostomy  in November 2019. \par Here for follow up.  In the loop for bariatric surgery.   Fistula is still open,  some discharge at times. \par no belly pain.  Stoma functioning  no problems with appliance.  \par On occasion has rectal dishcarge. \par \par Has seen Riley Fisher and is considering morbid obesity surgery.   She has gone through the protocol for consideration for obesity surgery.

## 2021-04-22 NOTE — ASSESSMENT
[FreeTextEntry1] : A/P Colovesical fistula and diverted.  Remains morbidly obese.  Considering bypass or sleeve.  Saw Phillip.  Would prefer obesity surgery first.  Email below sent to Riley.\par \par She needs to lose weight.  about 300 lbs now.  \par Stoma working fine.\par Fistula about 2 cm wide beneath main pannus.\par Patient understands that the fistula takedown operation will likely be open and may be full length incision.\par She also knows that it is likely that she will need a temporary stoma after the colectomy and fistula takedown operation (vs keeping the transverse colon stoma).   \par Patient is doing fine with the stoma.  \par \par \par \par \par \par Re: Daniel Estrada ( 1985)\par Dear Riley,\par Hope you are well.  This patient is 35 years old and has a LUQ distal transverse loop colostomy for a colocutaneous fistula she developed after some type of GYN operation done to deal with recurrent pelvic infections.\par She weighs 297.  She has had the stoma for a year. She has the fistula still.  Taking the fistula down and resection the colon will likely end up being an open procedure given her bad infection and probable fibrosisis, etc. It is an elective procedure.   She is willing to have obesity surgery, as you know.   Would it be possible to do a sleeve or bypass with the colostomy in place?   Doing them together is not a good idea.  What do you think?\par Thanks.\par Marc\par \par \par

## 2021-04-22 NOTE — PHYSICAL EXAM
[Exam Deferred] : exam was deferred [Normal Breath Sounds] : Normal breath sounds [Normal Heart Sounds] : normal heart sounds [Alert] : alert [Oriented to Person] : oriented to person [Oriented to Place] : oriented to place [Oriented to Time] : oriented to time [Abdomen Masses] : No abdominal masses [Abdomen Tenderness] : ~T No ~M abdominal tenderness [de-identified] : istula about 2 cm wide beneath main pannus\par colsotomy in LUQ, intact, viable, ? small parastomal hernia, other incisions healed.  has fistula in middle at base of pannus in suprapubic area (2 cm wide) [de-identified] : deferred [de-identified] : NAD

## 2021-04-23 ENCOUNTER — NON-APPOINTMENT (OUTPATIENT)
Age: 36
End: 2021-04-23

## 2021-04-27 ENCOUNTER — LABORATORY RESULT (OUTPATIENT)
Age: 36
End: 2021-04-27

## 2021-04-29 ENCOUNTER — RESULT REVIEW (OUTPATIENT)
Age: 36
End: 2021-04-29

## 2021-04-29 ENCOUNTER — INPATIENT (INPATIENT)
Facility: HOSPITAL | Age: 36
LOS: 1 days | Discharge: HOME | DRG: 620 | End: 2021-05-01
Attending: SURGERY | Admitting: SURGERY
Payer: MEDICAID

## 2021-04-29 ENCOUNTER — APPOINTMENT (OUTPATIENT)
Dept: SURGERY | Facility: HOSPITAL | Age: 36
End: 2021-04-29

## 2021-04-29 VITALS
WEIGHT: 293 LBS | HEART RATE: 64 BPM | DIASTOLIC BLOOD PRESSURE: 73 MMHG | RESPIRATION RATE: 16 BRPM | OXYGEN SATURATION: 99 % | TEMPERATURE: 98 F | HEIGHT: 63 IN | SYSTOLIC BLOOD PRESSURE: 117 MMHG

## 2021-04-29 DIAGNOSIS — Z98.890 OTHER SPECIFIED POSTPROCEDURAL STATES: Chronic | ICD-10-CM

## 2021-04-29 DIAGNOSIS — E66.01 MORBID (SEVERE) OBESITY DUE TO EXCESS CALORIES: ICD-10-CM

## 2021-04-29 LAB
BLD GP AB SCN SERPL QL: NEGATIVE — SIGNIFICANT CHANGE UP
HCT VFR BLD CALC: 39.9 % — SIGNIFICANT CHANGE UP (ref 34.5–45)
HGB BLD-MCNC: 13.3 G/DL — SIGNIFICANT CHANGE UP (ref 11.5–15.5)
MCHC RBC-ENTMCNC: 29 PG — SIGNIFICANT CHANGE UP (ref 27–34)
MCHC RBC-ENTMCNC: 33.3 GM/DL — SIGNIFICANT CHANGE UP (ref 32–36)
MCV RBC AUTO: 86.9 FL — SIGNIFICANT CHANGE UP (ref 80–100)
NRBC # BLD: 0 /100 WBCS — SIGNIFICANT CHANGE UP (ref 0–0)
PLATELET # BLD AUTO: 206 K/UL — SIGNIFICANT CHANGE UP (ref 150–400)
RBC # BLD: 4.59 M/UL — SIGNIFICANT CHANGE UP (ref 3.8–5.2)
RBC # FLD: 13.1 % — SIGNIFICANT CHANGE UP (ref 10.3–14.5)
RH IG SCN BLD-IMP: POSITIVE — SIGNIFICANT CHANGE UP
WBC # BLD: 14.56 K/UL — HIGH (ref 3.8–10.5)
WBC # FLD AUTO: 14.56 K/UL — HIGH (ref 3.8–10.5)

## 2021-04-29 PROCEDURE — 43775 LAP SLEEVE GASTRECTOMY: CPT | Mod: GC

## 2021-04-29 PROCEDURE — 88307 TISSUE EXAM BY PATHOLOGIST: CPT | Mod: 26

## 2021-04-29 PROCEDURE — S2900 ROBOTIC SURGICAL SYSTEM: CPT | Mod: NC

## 2021-04-29 RX ORDER — ENOXAPARIN SODIUM 100 MG/ML
40 INJECTION SUBCUTANEOUS ONCE
Refills: 0 | Status: COMPLETED | OUTPATIENT
Start: 2021-04-29 | End: 2021-04-29

## 2021-04-29 RX ORDER — ACETAMINOPHEN 500 MG
1000 TABLET ORAL ONCE
Refills: 0 | Status: COMPLETED | OUTPATIENT
Start: 2021-04-29 | End: 2021-04-29

## 2021-04-29 RX ORDER — ACETAMINOPHEN 500 MG
1000 TABLET ORAL EVERY 6 HOURS
Refills: 0 | Status: DISCONTINUED | OUTPATIENT
Start: 2021-04-29 | End: 2021-04-29

## 2021-04-29 RX ORDER — BUPIVACAINE 13.3 MG/ML
20 INJECTION, SUSPENSION, LIPOSOMAL INFILTRATION ONCE
Refills: 0 | Status: DISCONTINUED | OUTPATIENT
Start: 2021-04-29 | End: 2021-04-29

## 2021-04-29 RX ORDER — SCOPALAMINE 1 MG/3D
1 PATCH, EXTENDED RELEASE TRANSDERMAL
Refills: 0 | Status: DISCONTINUED | OUTPATIENT
Start: 2021-04-29 | End: 2021-04-29

## 2021-04-29 RX ORDER — METOCLOPRAMIDE HCL 10 MG
10 TABLET ORAL ONCE
Refills: 0 | Status: COMPLETED | OUTPATIENT
Start: 2021-04-29 | End: 2021-04-29

## 2021-04-29 RX ORDER — HYDROMORPHONE HYDROCHLORIDE 2 MG/ML
0.5 INJECTION INTRAMUSCULAR; INTRAVENOUS; SUBCUTANEOUS
Refills: 0 | Status: DISCONTINUED | OUTPATIENT
Start: 2021-04-29 | End: 2021-05-01

## 2021-04-29 RX ORDER — GABAPENTIN 400 MG/1
300 CAPSULE ORAL ONCE
Refills: 0 | Status: COMPLETED | OUTPATIENT
Start: 2021-04-29 | End: 2021-04-29

## 2021-04-29 RX ORDER — ONDANSETRON 8 MG/1
4 TABLET, FILM COATED ORAL EVERY 6 HOURS
Refills: 0 | Status: DISCONTINUED | OUTPATIENT
Start: 2021-04-29 | End: 2021-05-01

## 2021-04-29 RX ORDER — SODIUM CHLORIDE 9 MG/ML
1000 INJECTION, SOLUTION INTRAVENOUS
Refills: 0 | Status: DISCONTINUED | OUTPATIENT
Start: 2021-04-29 | End: 2021-05-01

## 2021-04-29 RX ORDER — PANTOPRAZOLE SODIUM 20 MG/1
40 TABLET, DELAYED RELEASE ORAL DAILY
Refills: 0 | Status: DISCONTINUED | OUTPATIENT
Start: 2021-04-29 | End: 2021-05-01

## 2021-04-29 RX ORDER — KETOROLAC TROMETHAMINE 30 MG/ML
15 SYRINGE (ML) INJECTION EVERY 6 HOURS
Refills: 0 | Status: DISCONTINUED | OUTPATIENT
Start: 2021-04-29 | End: 2021-04-30

## 2021-04-29 RX ADMIN — PANTOPRAZOLE SODIUM 40 MILLIGRAM(S): 20 TABLET, DELAYED RELEASE ORAL at 15:51

## 2021-04-29 RX ADMIN — Medication 10 MILLIGRAM(S): at 19:04

## 2021-04-29 RX ADMIN — HYDROMORPHONE HYDROCHLORIDE 0.5 MILLIGRAM(S): 2 INJECTION INTRAMUSCULAR; INTRAVENOUS; SUBCUTANEOUS at 16:52

## 2021-04-29 RX ADMIN — SCOPALAMINE 1 PATCH: 1 PATCH, EXTENDED RELEASE TRANSDERMAL at 10:35

## 2021-04-29 RX ADMIN — ENOXAPARIN SODIUM 40 MILLIGRAM(S): 100 INJECTION SUBCUTANEOUS at 10:36

## 2021-04-29 RX ADMIN — Medication 400 MILLIGRAM(S): at 19:21

## 2021-04-29 RX ADMIN — Medication 15 MILLIGRAM(S): at 23:40

## 2021-04-29 RX ADMIN — ONDANSETRON 4 MILLIGRAM(S): 8 TABLET, FILM COATED ORAL at 16:42

## 2021-04-29 RX ADMIN — Medication 1000 MILLIGRAM(S): at 10:36

## 2021-04-29 RX ADMIN — GABAPENTIN 300 MILLIGRAM(S): 400 CAPSULE ORAL at 10:36

## 2021-04-29 RX ADMIN — Medication 1000 MILLIGRAM(S): at 19:31

## 2021-04-29 RX ADMIN — HYDROMORPHONE HYDROCHLORIDE 0.5 MILLIGRAM(S): 2 INJECTION INTRAMUSCULAR; INTRAVENOUS; SUBCUTANEOUS at 16:37

## 2021-04-29 RX ADMIN — SCOPALAMINE 1 PATCH: 1 PATCH, EXTENDED RELEASE TRANSDERMAL at 19:20

## 2021-04-29 RX ADMIN — SODIUM CHLORIDE 160 MILLILITER(S): 9 INJECTION, SOLUTION INTRAVENOUS at 15:47

## 2021-04-29 NOTE — H&P ADULT - NSHPPHYSICALEXAM_GEN_ALL_CORE
Physical Exam:  General: NAD  Abdominal: soft, NT/ND. Colostomy in place with stool.  Extremities: WWP

## 2021-04-29 NOTE — H&P ADULT - ASSESSMENT
35F PMH possible L tuboovarian abscess s/p IR drain 04/01/2019 c/b multiple abscesses and sigmoid colon to bladder fistula treated with chronic antibiotics s/p ovarian cyst removal 06/2019 c/b abscess s/p operative debridement c/b multiple abscesses and fistulae presented to [facility] on 11/01 with colocutaneous fistula. Pt was admitted under the care of Dr. Saeed. On 11/05 pt underwent diverting transeverse loop colostomy creation. She presents today for elective gastric sleeve resection with plans for fistula takedown later on.    Plan  - OR with Dr. Thornton for sleeve gastrectomy  - Post op orders pending

## 2021-04-29 NOTE — PROGRESS NOTE ADULT - SUBJECTIVE AND OBJECTIVE BOX
POST-OPERATIVE NOTE    Procedure: Robotic assisted sleeve gastrectomy    Diagnosis/Indication: morbid obesity    Surgeon: Dr. Chaparro    S: Pt has no complaints. Denies CP, SOB, DUPREE, calf tenderness. Pain controlled with medication. Denies nausea, vomiting.    O:  T(C): 36.5 (04-29-21 @ 17:07), Max: 36.7 (04-29-21 @ 15:26)  T(F): 97.7 (04-29-21 @ 17:07), Max: 98 (04-29-21 @ 15:26)  HR: 52 (04-29-21 @ 17:07) (51 - 77)  BP: 130/60 (04-29-21 @ 17:07) (122/58 - 141/63)  RR: 18 (04-29-21 @ 17:07) (13 - 26)  SpO2: 97% (04-29-21 @ 17:07) (97% - 100%)  Wt(kg): --    Gen: NAD, resting comfortably in bed  C/V: NSR  Pulm: Nonlabored breathing, no respiratory distress  Abd: obese, soft, NT/ND, incision areas are clean, dry and intact, colostomy with little output  Extrem: WWP, no calf edema or tenderness, SCDs in place    A/P: 35y Female s/p above procedure  Diet: BCLD  IVF: LR @ 160cc/hr  Pain/nausea control  SCDs/OOBA/IS  Dispo pending pain control, PO tolerance, clinical improvement

## 2021-04-29 NOTE — BRIEF OPERATIVE NOTE - OPERATION/FINDINGS
Ports placed in usual fashion and robot docked. Ports placed in usual fashion and robot docked. Abdomen entered safely. Liver adhesed  to ant abdominal wall. Transverse colon loop colostomy with omentum tented up. Greater curvature dissected with ligasure. Sleeve gastrectomy perfromed with 5x blue loads over 32F bougie. Staple line imbricated with 2 O PDS. Greater curvature remnant sutured to omentum with 2 O PDS. Sleeve removed with gabriel. Skin closed.

## 2021-04-29 NOTE — H&P ADULT - NSICDXPASTMEDICALHX_GEN_ALL_CORE_FT
PAST MEDICAL HISTORY:  Colocutaneous fistula COLOSTOMY    Ovarian cyst     Tubo-ovarian abscess

## 2021-04-29 NOTE — H&P ADULT - HISTORY OF PRESENT ILLNESS
35F PMH possible L tuboovarian abscess s/p IR drain 04/01/2019 c/b multiple abscesses and sigmoid colon to bladder fistula treated with chronic antibiotics s/p ovarian cyst removal 06/2019 c/b abscess s/p operative debridement c/b multiple abscesses and fistulae presented to [facility] on 11/01 with colocutaneous fistula. Pt was admitted under the care of Dr. Saeed. On 11/05 pt underwent diverting transeverse loop colostomy creation. She presents today for elective gastric sleeve resection with plans for fistula takedown later on.

## 2021-04-29 NOTE — PACU DISCHARGE NOTE - COMMENTS
Patient AAOx4, denies pain, VSS, due to void, surgical incision CDI, report given to 9 URIS RN, patient wheeled via bed off monitored as ordered

## 2021-04-30 ENCOUNTER — TRANSCRIPTION ENCOUNTER (OUTPATIENT)
Age: 36
End: 2021-04-30

## 2021-04-30 LAB
ANION GAP SERPL CALC-SCNC: 13 MMOL/L — SIGNIFICANT CHANGE UP (ref 5–17)
BASOPHILS # BLD AUTO: 0.02 K/UL — SIGNIFICANT CHANGE UP (ref 0–0.2)
BASOPHILS NFR BLD AUTO: 0.1 % — SIGNIFICANT CHANGE UP (ref 0–2)
BUN SERPL-MCNC: 9 MG/DL — SIGNIFICANT CHANGE UP (ref 7–23)
CALCIUM SERPL-MCNC: 8.8 MG/DL — SIGNIFICANT CHANGE UP (ref 8.4–10.5)
CHLORIDE SERPL-SCNC: 101 MMOL/L — SIGNIFICANT CHANGE UP (ref 96–108)
CO2 SERPL-SCNC: 23 MMOL/L — SIGNIFICANT CHANGE UP (ref 22–31)
COVID-19 SPIKE DOMAIN AB INTERP: POSITIVE
COVID-19 SPIKE DOMAIN ANTIBODY RESULT: 24.9 U/ML — HIGH
CREAT SERPL-MCNC: 0.67 MG/DL — SIGNIFICANT CHANGE UP (ref 0.5–1.3)
EOSINOPHIL # BLD AUTO: 0 K/UL — SIGNIFICANT CHANGE UP (ref 0–0.5)
EOSINOPHIL NFR BLD AUTO: 0 % — SIGNIFICANT CHANGE UP (ref 0–6)
GLUCOSE SERPL-MCNC: 137 MG/DL — HIGH (ref 70–99)
HCT VFR BLD CALC: 33.2 % — LOW (ref 34.5–45)
HGB BLD-MCNC: 11.1 G/DL — LOW (ref 11.5–15.5)
IMM GRANULOCYTES NFR BLD AUTO: 0.8 % — SIGNIFICANT CHANGE UP (ref 0–1.5)
LYMPHOCYTES # BLD AUTO: 0.88 K/UL — LOW (ref 1–3.3)
LYMPHOCYTES # BLD AUTO: 5.5 % — LOW (ref 13–44)
MAGNESIUM SERPL-MCNC: 1.8 MG/DL — SIGNIFICANT CHANGE UP (ref 1.6–2.6)
MCHC RBC-ENTMCNC: 28.5 PG — SIGNIFICANT CHANGE UP (ref 27–34)
MCHC RBC-ENTMCNC: 33.4 GM/DL — SIGNIFICANT CHANGE UP (ref 32–36)
MCV RBC AUTO: 85.3 FL — SIGNIFICANT CHANGE UP (ref 80–100)
MONOCYTES # BLD AUTO: 0.78 K/UL — SIGNIFICANT CHANGE UP (ref 0–0.9)
MONOCYTES NFR BLD AUTO: 4.9 % — SIGNIFICANT CHANGE UP (ref 2–14)
NEUTROPHILS # BLD AUTO: 14.13 K/UL — HIGH (ref 1.8–7.4)
NEUTROPHILS NFR BLD AUTO: 88.7 % — HIGH (ref 43–77)
NRBC # BLD: 0 /100 WBCS — SIGNIFICANT CHANGE UP (ref 0–0)
PHOSPHATE SERPL-MCNC: 3 MG/DL — SIGNIFICANT CHANGE UP (ref 2.5–4.5)
PLATELET # BLD AUTO: 224 K/UL — SIGNIFICANT CHANGE UP (ref 150–400)
POTASSIUM SERPL-MCNC: 4.1 MMOL/L — SIGNIFICANT CHANGE UP (ref 3.5–5.3)
POTASSIUM SERPL-SCNC: 4.1 MMOL/L — SIGNIFICANT CHANGE UP (ref 3.5–5.3)
RBC # BLD: 3.89 M/UL — SIGNIFICANT CHANGE UP (ref 3.8–5.2)
RBC # FLD: 13.2 % — SIGNIFICANT CHANGE UP (ref 10.3–14.5)
SARS-COV-2 IGG+IGM SERPL QL IA: 24.9 U/ML — HIGH
SARS-COV-2 IGG+IGM SERPL QL IA: POSITIVE
SODIUM SERPL-SCNC: 137 MMOL/L — SIGNIFICANT CHANGE UP (ref 135–145)
SURGICAL PATHOLOGY STUDY: SIGNIFICANT CHANGE UP
WBC # BLD: 15.94 K/UL — HIGH (ref 3.8–10.5)
WBC # FLD AUTO: 15.94 K/UL — HIGH (ref 3.8–10.5)

## 2021-04-30 RX ORDER — ACETAMINOPHEN 500 MG
1000 TABLET ORAL ONCE
Refills: 0 | Status: COMPLETED | OUTPATIENT
Start: 2021-04-30 | End: 2021-04-30

## 2021-04-30 RX ORDER — DEXAMETHASONE 0.5 MG/5ML
5 ELIXIR ORAL ONCE
Refills: 0 | Status: DISCONTINUED | OUTPATIENT
Start: 2021-04-30 | End: 2021-04-30

## 2021-04-30 RX ORDER — DEXAMETHASONE 0.5 MG/5ML
5 ELIXIR ORAL ONCE
Refills: 0 | Status: DISCONTINUED | OUTPATIENT
Start: 2021-04-30 | End: 2021-05-01

## 2021-04-30 RX ORDER — PANTOPRAZOLE SODIUM 20 MG/1
1 TABLET, DELAYED RELEASE ORAL
Qty: 30 | Refills: 0
Start: 2021-04-30 | End: 2021-05-29

## 2021-04-30 RX ORDER — ENOXAPARIN SODIUM 100 MG/ML
40 INJECTION SUBCUTANEOUS EVERY 12 HOURS
Refills: 0 | Status: DISCONTINUED | OUTPATIENT
Start: 2021-04-30 | End: 2021-04-30

## 2021-04-30 RX ADMIN — HYDROMORPHONE HYDROCHLORIDE 0.5 MILLIGRAM(S): 2 INJECTION INTRAMUSCULAR; INTRAVENOUS; SUBCUTANEOUS at 04:44

## 2021-04-30 RX ADMIN — Medication 15 MILLIGRAM(S): at 11:09

## 2021-04-30 RX ADMIN — Medication 400 MILLIGRAM(S): at 22:21

## 2021-04-30 RX ADMIN — Medication 15 MILLIGRAM(S): at 10:22

## 2021-04-30 RX ADMIN — Medication 1000 MILLIGRAM(S): at 16:50

## 2021-04-30 RX ADMIN — PANTOPRAZOLE SODIUM 40 MILLIGRAM(S): 20 TABLET, DELAYED RELEASE ORAL at 11:27

## 2021-04-30 RX ADMIN — Medication 400 MILLIGRAM(S): at 16:26

## 2021-04-30 RX ADMIN — Medication 15 MILLIGRAM(S): at 00:00

## 2021-04-30 RX ADMIN — SCOPALAMINE 1 PATCH: 1 PATCH, EXTENDED RELEASE TRANSDERMAL at 19:36

## 2021-04-30 RX ADMIN — SCOPALAMINE 1 PATCH: 1 PATCH, EXTENDED RELEASE TRANSDERMAL at 08:27

## 2021-04-30 RX ADMIN — SODIUM CHLORIDE 100 MILLILITER(S): 9 INJECTION, SOLUTION INTRAVENOUS at 19:36

## 2021-04-30 NOTE — DISCHARGE NOTE PROVIDER - CARE PROVIDER_API CALL
Riley Chaparro)  Surgery  [address] [address]  Phone: [phone]  Fax: [phone]  Follow Up Time:

## 2021-04-30 NOTE — PROVIDER CONTACT NOTE (CHANGE IN STATUS NOTIFICATION) - ASSESSMENT
Pt denies sob, chest pain, blurry vision and headache. temp oral- 98.5F, HR-50, BP-131/77, RR-19 and O2- 95% RA. Pt resting comfortably.

## 2021-04-30 NOTE — DIETITIAN INITIAL EVALUATION ADULT. - OTHER CALCULATIONS
IBW used for calculations as pt >120% of IBW (265%). Above energy needs calculated for wt maintenance (20-25kcal/kg).   Weeks 1-2 estimated needs: 521-625kcal/day (10-12kcal/kg), 62-78g pro/day (1.2-1.5g/kg), >/=64oz clear fluids.

## 2021-04-30 NOTE — DISCHARGE NOTE PROVIDER - NSDCFUADDINST_GEN_ALL_CORE_FT
Follow up with  in 1 week. Call the office at  to schedule your appointment. You may shower; soap and water over incision sites. Do not scrub. Pat dry when done. No tub bathing or swimming until cleared. Keep incision sites out of the sun as scars will darken. No heavy lifting (>10lbs) or strenuous exercise. Diet: Bariatric Full Fluids. 60 grams protein daily.  64 fluid ounces water daily. Drink small sips throughout the day. Continue diet as outlined by paperwork received as a pre-operative patient. You should be urinating at least 3-4x per day. Call the office if you experience increasing abdominal pain, nausea, vomiting, or temperature >101 F.  NO ASPIRIN OR NSAIDs until approved by Dr. Chaparro. Avoid alcoholic beverages until cleared by Dr. Chaparro.    General Discharge Instructions:  Please resume all regular home medications unless specifically advised not to take a particular medication. Also, please take any new medications as prescribed.  Please get plenty of rest, continue to ambulate several times per day, and drink adequate amounts of fluids. Avoid lifting weights greater than 5-10 lbs until you follow-up with your surgeon, who will instruct you further regarding activity restrictions.  Avoid driving or operating heavy machinery while taking pain medications.  Please follow-up with your surgeon and Primary Care Provider (PCP) as advised.  Incision Care:  *Please call your doctor or nurse practitioner if you have increased pain, swelling, redness, or drainage from the incision site.  *Avoid swimming and baths until your follow-up appointment.  *You may shower, and wash surgical incisions with a mild soap and warm water. Gently pat the area dry.  *If you have staples, they will be removed at your follow-up appointment.  *If you have steri-strips, they will fall off on their own. Please remove any remaining strips 7-10 days after surgery.    Warning Signs:  Please call your doctor or nurse practitioner if you experience the following:  *You experience new chest pain, pressure, squeezing or tightness.  *New or worsening cough, shortness of breath, or wheeze.  *If you are vomiting and cannot keep down fluids or your medications.  *You are getting dehydrated due to continued vomiting, diarrhea, or other reasons. Signs of dehydration include dry mouth, rapid heartbeat, or feeling dizzy or faint when standing.  *You see blood or dark/black material when you vomit or have a bowel movement.  *You experience burning when you urinate, have blood in your urine, or experience a discharge.  *Your pain is not improving within 8-12 hours or is not gone within 24 hours. Call or return immediately if your pain is getting worse, changes location, or moves to your chest or back.  *You have shaking chills, or fever greater than 101.5 degrees Fahrenheit or 38 degrees Celsius.  *Any change in your symptoms, or any new symptoms that concern you.     Follow up with  in 1 week. Call the office at  to schedule your appointment. You may shower; soap and water over incision sites. Do not scrub. Pat dry when done. No tub bathing or swimming until cleared. Keep incision sites out of the sun as scars will darken. No heavy lifting (>10lbs) or strenuous exercise. Diet: Bariatric Full Fluids. 60 grams protein daily.  64 fluid ounces water daily. Drink small sips throughout the day. Continue diet as outlined by paperwork received as a pre-operative patient. You should be urinating at least 3-4x per day. Call the office if you experience increasing abdominal pain, nausea, vomiting, or temperature >101 F.  NO ASPIRIN OR NSAIDs until approved by Dr. Chaparro. Avoid alcoholic beverages until cleared by Dr. Chaparro.    General Discharge Instructions:  Please resume all regular home medications unless specifically advised not to take a particular medication. Also, please take any new medications as prescribed.  Please get plenty of rest, continue to ambulate several times per day, and drink adequate amounts of fluids. Avoid lifting weights greater than 5-10 lbs until you follow-up with your surgeon, who will instruct you further regarding activity restrictions.  Avoid driving or operating heavy machinery while taking pain medications.  Please follow-up with your surgeon and Primary Care Provider (PCP) as advised.  Incision Care:  *Please call your doctor or nurse practitioner if you have increased pain, swelling, redness, or drainage from the incision site.  *Avoid swimming and baths until your follow-up appointment.  *You may shower, and wash surgical incisions with a mild soap and warm water. Gently pat the area dry.  *If you have staples, they will be removed at your follow-up appointment.  *If you have steri-strips, they will fall off on their own. Please remove any remaining strips 7-10 days after surgery.    Warning Signs:  Please call your doctor or nurse practitioner if you experience the following:  *You experience new chest pain, pressure, squeezing or tightness.  *New or worsening cough, shortness of breath, or wheeze.  *If you are vomiting and cannot keep down fluids or your medications.  *You are getting dehydrated due to continued vomiting, diarrhea, or other reasons. Signs of dehydration include dry mouth, rapid heartbeat, or feeling dizzy or faint when standing.  *You see blood or dark/black material when you vomit or have a bowel movement.  *You experience burning when you urinate, have blood in your urine, or experience a discharge.  *Your pain is not improving within 8-12 hours or is not gone within 24 hours. Call or return immediately if your pain is getting worse, changes location, or moves to your chest or back.  *You have shaking chills, or fever greater than 101.5 degrees Fahrenheit or 38 degrees Celsius.  *Any change in your symptoms, or any new symptoms that concern you.    Please keep track of how much Tylenol (acetaminophen) you are taking. Do not take more than 4,000 mg per day. Be aware that Percocet and Tylenol #3 has tylenol in it.

## 2021-04-30 NOTE — PROVIDER CONTACT NOTE (CHANGE IN STATUS NOTIFICATION) - BACKGROUND
Pt PO day 0 s/p robotic assisted sleeve gastrectomy w/ hx of colocuntaneous fistula, ovarian cyst and tubo-ovarian abscess. Pt POD day 0 s/p robotic assisted sleeve gastrectomy w/ hx of colocuntaneous fistula, ovarian cyst and tubo-ovarian abscess.

## 2021-04-30 NOTE — DISCHARGE NOTE PROVIDER - NSDCCPTREATMENT_GEN_ALL_CORE_FT
PRINCIPAL PROCEDURE  Procedure: Laparoscopic sleeve gastrectomy  Findings and Treatment: Robot assisted

## 2021-04-30 NOTE — DIETITIAN INITIAL EVALUATION ADULT. - OTHER INFO
35F with hx of possible L tuboovarian abscess s/p IR drain 04/01/2019 c/b multiple abscesses and sigmoid colon to bladder fistula treated with chronic antibiotics s/p ovarian cyst removal 06/2019 c/b abscess s/p operative debridement c/b multiple abscesses and fistulae now admitting for elective lap sleeve gastrectomy (4/29), now POD #1. On assessment, pt resting in bed. Currently on BARICLLIQ diet, tolerating PO. Pt had adequate PO intake this morning, consuming 9oz prior to 830am. Pain and nausea well controlled. Discussed volumes of various cup sizes on tray table and encouraged aiming for 4 oz/hr as tolerated. Prepared with protein shakes, with plan to get vitamins after discharge. RD provided indepth edu on diet advancement and specific nutrient needs s/p LSG. NKFA. No dietary restrictions at home. Skin: surgical incisions. GI: WDL per flowsheet. RD to follow up per protocol.

## 2021-04-30 NOTE — PROGRESS NOTE ADULT - SUBJECTIVE AND OBJECTIVE BOX
SUBJECTIVE: Patient seen and examined bedside by chief resident.      MEDICATIONS  (PRN):  HYDROmorphone  Injectable 0.5 milliGRAM(s) IV Push every 15 minutes PRN Severe Pain (7 - 10)  ketorolac   Injectable 15 milliGRAM(s) IV Push every 6 hours PRN Mild Pain (1 - 3)  ondansetron Injectable 4 milliGRAM(s) IV Push every 6 hours PRN Nausea      I&O's Detail    29 Apr 2021 07:01  -  30 Apr 2021 05:59  --------------------------------------------------------  IN:    Lactated Ringers: 2240 mL    Oral Fluid: 220 mL  Total IN: 2460 mL    OUT:    Voided (mL): 800 mL  Total OUT: 800 mL    Total NET: 1660 mL          Vital Signs Last 24 Hrs  T(C): 36.8 (30 Apr 2021 04:46), Max: 37 (30 Apr 2021 00:04)  T(F): 98.2 (30 Apr 2021 04:46), Max: 98.6 (30 Apr 2021 00:04)  HR: 51 (30 Apr 2021 04:46) (46 - 77)  BP: 124/49 (30 Apr 2021 04:46) (116/56 - 146/73)  BP(mean): 98 (29 Apr 2021 18:13) (81 - 106)  RR: 18 (30 Apr 2021 04:46) (13 - 26)  SpO2: 98% (30 Apr 2021 04:46) (95% - 100%)    General: NAD, resting comfortably in bed  C/V: Regular rate  Pulm: Nonlabored breathing, no respiratory distress  Abd: soft, ND, NT  Extrem: WWP, no edema, SCDs in place    LABS:                        13.3   14.56 )-----------( 206      ( 29 Apr 2021 20:09 )             39.9                 RADIOLOGY & ADDITIONAL STUDIES:     SUBJECTIVE: Patient seen and examined bedside by chief resident, feels well. pain and nausea under control.      MEDICATIONS  (PRN):  HYDROmorphone  Injectable 0.5 milliGRAM(s) IV Push every 15 minutes PRN Severe Pain (7 - 10)  ketorolac   Injectable 15 milliGRAM(s) IV Push every 6 hours PRN Mild Pain (1 - 3)  ondansetron Injectable 4 milliGRAM(s) IV Push every 6 hours PRN Nausea      I&O's Detail    29 Apr 2021 07:01  -  30 Apr 2021 05:59  --------------------------------------------------------  IN:    Lactated Ringers: 2240 mL    Oral Fluid: 220 mL  Total IN: 2460 mL    OUT:    Voided (mL): 800 mL  Total OUT: 800 mL    Total NET: 1660 mL    Vital Signs Last 24 Hrs  T(C): 36.8 (30 Apr 2021 04:46), Max: 37 (30 Apr 2021 00:04)  T(F): 98.2 (30 Apr 2021 04:46), Max: 98.6 (30 Apr 2021 00:04)  HR: 51 (30 Apr 2021 04:46) (46 - 77)  BP: 124/49 (30 Apr 2021 04:46) (116/56 - 146/73)  BP(mean): 98 (29 Apr 2021 18:13) (81 - 106)  RR: 18 (30 Apr 2021 04:46) (13 - 26)  SpO2: 98% (30 Apr 2021 04:46) (95% - 100%)    General: NAD, resting comfortably in bed  C/V: Regular rate  Pulm: Nonlabored breathing, no respiratory distress  Abd: soft, Non distended, non tender, incisions c/d/i  Extrem: WWP, no edema, SCDs in place    LABS:                        13.3   14.56 )-----------( 206      ( 29 Apr 2021 20:09 )             39.9

## 2021-04-30 NOTE — PROGRESS NOTE ADULT - ASSESSMENT
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 Saeed now s/p robotic laparoscopic sleeve gastrectomy on 4/29.     BCLD/IVF  Pain/nausea control  Protonix  OOBA/SCD/IS  Lovenox as Hb stable  AM labs  Nutrition consult in AM

## 2021-04-30 NOTE — DISCHARGE NOTE PROVIDER - NSDCACTIVITY_GEN_ALL_CORE
Return to Work/School allowed/Sex allowed/Do not drive or operate machinery/Showering allowed/Do not make important decisions/Stairs allowed/Driving allowed/Walking - Indoors allowed/No heavy lifting/straining/Walking - Outdoors allowed

## 2021-04-30 NOTE — DISCHARGE NOTE PROVIDER - HOSPITAL COURSE
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 Saeed now s/p robotic laparoscopic sleeve gastrectomy on 4/29.   She was observed for some time in the PACU after which she was transferred to the floors where we provided her a clear liquid diet, pain and nausea control and vital sign monitoring. On POD 1 her labs were stable, and he passed his trial of void, he tolerated 4 ounces of fluid every hour for 4 hours straight. He was kept for one more day for observation and on POD 2 he was deemed a safe discharge with outpatient follow up.

## 2021-05-01 ENCOUNTER — TRANSCRIPTION ENCOUNTER (OUTPATIENT)
Age: 36
End: 2021-05-01

## 2021-05-01 VITALS
OXYGEN SATURATION: 98 % | SYSTOLIC BLOOD PRESSURE: 112 MMHG | TEMPERATURE: 99 F | DIASTOLIC BLOOD PRESSURE: 72 MMHG | HEART RATE: 51 BPM | RESPIRATION RATE: 19 BRPM

## 2021-05-01 LAB
ANION GAP SERPL CALC-SCNC: 8 MMOL/L — SIGNIFICANT CHANGE UP (ref 5–17)
BUN SERPL-MCNC: 7 MG/DL — SIGNIFICANT CHANGE UP (ref 7–23)
CALCIUM SERPL-MCNC: 8.5 MG/DL — SIGNIFICANT CHANGE UP (ref 8.4–10.5)
CHLORIDE SERPL-SCNC: 100 MMOL/L — SIGNIFICANT CHANGE UP (ref 96–108)
CO2 SERPL-SCNC: 27 MMOL/L — SIGNIFICANT CHANGE UP (ref 22–31)
CREAT SERPL-MCNC: 0.64 MG/DL — SIGNIFICANT CHANGE UP (ref 0.5–1.3)
GLUCOSE SERPL-MCNC: 104 MG/DL — HIGH (ref 70–99)
HCT VFR BLD CALC: 29.4 % — LOW (ref 34.5–45)
HGB BLD-MCNC: 9.6 G/DL — LOW (ref 11.5–15.5)
MAGNESIUM SERPL-MCNC: 1.6 MG/DL — SIGNIFICANT CHANGE UP (ref 1.6–2.6)
MCHC RBC-ENTMCNC: 28.7 PG — SIGNIFICANT CHANGE UP (ref 27–34)
MCHC RBC-ENTMCNC: 32.7 GM/DL — SIGNIFICANT CHANGE UP (ref 32–36)
MCV RBC AUTO: 87.8 FL — SIGNIFICANT CHANGE UP (ref 80–100)
NRBC # BLD: 0 /100 WBCS — SIGNIFICANT CHANGE UP (ref 0–0)
PHOSPHATE SERPL-MCNC: 2.1 MG/DL — LOW (ref 2.5–4.5)
PLATELET # BLD AUTO: 151 K/UL — SIGNIFICANT CHANGE UP (ref 150–400)
POTASSIUM SERPL-MCNC: 4 MMOL/L — SIGNIFICANT CHANGE UP (ref 3.5–5.3)
POTASSIUM SERPL-SCNC: 4 MMOL/L — SIGNIFICANT CHANGE UP (ref 3.5–5.3)
RBC # BLD: 3.35 M/UL — LOW (ref 3.8–5.2)
RBC # FLD: 13.2 % — SIGNIFICANT CHANGE UP (ref 10.3–14.5)
SODIUM SERPL-SCNC: 135 MMOL/L — SIGNIFICANT CHANGE UP (ref 135–145)
WBC # BLD: 11.1 K/UL — HIGH (ref 3.8–10.5)
WBC # FLD AUTO: 11.1 K/UL — HIGH (ref 3.8–10.5)

## 2021-05-01 PROCEDURE — 86769 SARS-COV-2 COVID-19 ANTIBODY: CPT

## 2021-05-01 PROCEDURE — 88307 TISSUE EXAM BY PATHOLOGIST: CPT

## 2021-05-01 PROCEDURE — 80048 BASIC METABOLIC PNL TOTAL CA: CPT

## 2021-05-01 PROCEDURE — 85027 COMPLETE CBC AUTOMATED: CPT

## 2021-05-01 PROCEDURE — 84100 ASSAY OF PHOSPHORUS: CPT

## 2021-05-01 PROCEDURE — 86901 BLOOD TYPING SEROLOGIC RH(D): CPT

## 2021-05-01 PROCEDURE — 86900 BLOOD TYPING SEROLOGIC ABO: CPT

## 2021-05-01 PROCEDURE — S2900: CPT

## 2021-05-01 PROCEDURE — 83735 ASSAY OF MAGNESIUM: CPT

## 2021-05-01 PROCEDURE — C1889: CPT

## 2021-05-01 PROCEDURE — 36415 COLL VENOUS BLD VENIPUNCTURE: CPT

## 2021-05-01 PROCEDURE — 85025 COMPLETE CBC W/AUTO DIFF WBC: CPT

## 2021-05-01 PROCEDURE — 86850 RBC ANTIBODY SCREEN: CPT

## 2021-05-01 RX ORDER — ACETAMINOPHEN 500 MG
1000 TABLET ORAL ONCE
Refills: 0 | Status: COMPLETED | OUTPATIENT
Start: 2021-05-01 | End: 2021-05-01

## 2021-05-01 RX ADMIN — Medication 400 MILLIGRAM(S): at 04:23

## 2021-05-01 RX ADMIN — Medication 1000 MILLIGRAM(S): at 08:23

## 2021-05-01 RX ADMIN — Medication 1000 MILLIGRAM(S): at 01:44

## 2021-05-01 NOTE — DISCHARGE NOTE NURSING/CASE MANAGEMENT/SOCIAL WORK - PATIENT PORTAL LINK FT
You can access the FollowMyHealth Patient Portal offered by [facility] by registering at the following website: [email or web address]. By joining [email or web address]’s FollowMyHealth portal, you will also be able to view your health information using other applications (apps) compatible with our system.

## 2021-05-03 PROBLEM — K63.2 FISTULA OF INTESTINE: Chronic | Status: ACTIVE | Noted: 2019-11-01

## 2021-05-06 DIAGNOSIS — E66.01 MORBID (SEVERE) OBESITY DUE TO EXCESS CALORIES: ICD-10-CM

## 2021-05-06 DIAGNOSIS — K63.2 FISTULA OF INTESTINE: ICD-10-CM

## 2021-05-06 DIAGNOSIS — K57.30 DIVERTICULOSIS OF LARGE INTESTINE WITHOUT PERFORATION OR ABSCESS WITHOUT BLEEDING: ICD-10-CM

## 2021-05-07 ENCOUNTER — APPOINTMENT (OUTPATIENT)
Dept: SURGERY | Facility: CLINIC | Age: 36
End: 2021-05-07
Payer: MEDICAID

## 2021-05-07 VITALS
HEIGHT: 63 IN | DIASTOLIC BLOOD PRESSURE: 83 MMHG | TEMPERATURE: 97.2 F | OXYGEN SATURATION: 98 % | WEIGHT: 289 LBS | HEART RATE: 79 BPM | BODY MASS INDEX: 51.21 KG/M2 | SYSTOLIC BLOOD PRESSURE: 122 MMHG

## 2021-05-07 PROCEDURE — 99024 POSTOP FOLLOW-UP VISIT: CPT

## 2021-05-07 NOTE — HISTORY OF PRESENT ILLNESS
[de-identified] : Pt is a 34 y/o F with complicated medical and surgical history and colostomy bag in place who presents today for one week post op s/p lap VSG 4/29/21. She is doing well overall. She denies fever, chills. States she is down 18 lbs and has been walking daily. She does report some drainage and pain from the incisions. Pt is vitamin compliant.

## 2021-05-07 NOTE — ASSESSMENT
[FreeTextEntry1] : Pt with complicated medical and surgical history and colostomy bag in place who presents today for one week post op s/p lap VSG 4/29/21. She is doing well overall. Down ~18 lbs. Tolerating PO intake. Incisions are clean and well healing with no signs of infection. Patient will meet our dietician to discuss nutritional counseling. She will follow up in 3 weeks.

## 2021-05-07 NOTE — PHYSICAL EXAM
[Obese] : obese [Normal] : affect appropriate [de-identified] : normal respiration [de-identified] : +incisions clean, well healing, no signs of infection +ecchymosis

## 2021-05-07 NOTE — ADDENDUM
[FreeTextEntry1] : This note was written by Naheed Roche on 05/07/2021 acting as scribe for Dr. Chaparro

## 2021-05-07 NOTE — END OF VISIT
[FreeTextEntry3] : All medical record entries made by the Scribe were at my, Dr. Chaparro's, discretion and personally dictated by me on 05/07/2021. I have reviewed the chart and agree that the record accurately reflects my personal performance of the history, physical exam, assessment and plan. I have also personally directed, reviewed and agreed to the chart.

## 2021-05-28 ENCOUNTER — APPOINTMENT (OUTPATIENT)
Dept: SURGERY | Facility: CLINIC | Age: 36
End: 2021-05-28
Payer: MEDICAID

## 2021-05-28 VITALS
TEMPERATURE: 97.1 F | OXYGEN SATURATION: 97 % | HEART RATE: 76 BPM | WEIGHT: 269 LBS | BODY MASS INDEX: 47.66 KG/M2 | DIASTOLIC BLOOD PRESSURE: 80 MMHG | SYSTOLIC BLOOD PRESSURE: 126 MMHG | HEIGHT: 63 IN

## 2021-05-28 PROCEDURE — 99024 POSTOP FOLLOW-UP VISIT: CPT

## 2021-05-28 NOTE — HISTORY OF PRESENT ILLNESS
[de-identified] : Pt is a 34 y/o F with complicated medical and surgical history and colostomy bag in place who presents today for one month post op s/p lap VSG 4/29/21. She is doing well overall. Down ~35 lbs. States she has been advancing her diet and has tried soups, vegetables and egg salad. Reports regurgitation after eating tuna fish and sugar. She reports regular BM.

## 2021-05-28 NOTE — END OF VISIT
[FreeTextEntry3] : All medical record entries made by the Scribe were at my, MOO Schultz, discretion and personally dictated by me on 05/28/2021 . I have reviewed the chart and agree that the record accurately reflects my personal performance of the history, physical exam, assessment and plan. I have also personally directed, reviewed and agreed to the chart.

## 2021-05-28 NOTE — ADDENDUM
[FreeTextEntry1] : This note was written by Naheed Roche on 05/28/2021 acting as scribe for MOO Schultz.

## 2021-06-30 ENCOUNTER — NON-APPOINTMENT (OUTPATIENT)
Age: 36
End: 2021-06-30

## 2021-08-06 ENCOUNTER — APPOINTMENT (OUTPATIENT)
Dept: SURGERY | Facility: CLINIC | Age: 36
End: 2021-08-06

## 2021-08-17 ENCOUNTER — APPOINTMENT (OUTPATIENT)
Dept: BARIATRICS | Facility: CLINIC | Age: 36
End: 2021-08-17
Payer: MEDICAID

## 2021-08-17 VITALS — HEIGHT: 63 IN | WEIGHT: 244 LBS | BODY MASS INDEX: 43.23 KG/M2

## 2021-08-17 DIAGNOSIS — Z98.84 BARIATRIC SURGERY STATUS: ICD-10-CM

## 2021-08-17 PROCEDURE — 99213 OFFICE O/P EST LOW 20 MIN: CPT | Mod: 95

## 2021-08-25 PROBLEM — Z98.84 STATUS POST LAPAROSCOPIC SLEEVE GASTRECTOMY: Status: ACTIVE | Noted: 2021-05-28

## 2021-08-25 NOTE — HISTORY OF PRESENT ILLNESS
[Home] : at home, [unfilled] , at the time of the visit. [Medical Office: ([facility])___] : at the medical office located in  [Verbal consent obtained from patient] : the patient, [unfilled] [de-identified] : Pt is a 36 y/o F 4 mo s/p vsg who presents today via teb feeling well here for routine f/u. Pt states she is overall doing great at home and is very happy with her progress, down 57 lbs since sx. States she is tolerating her diet at home and is continuing to adjust to the lifestyle changes at home appropriately. States she has been seeing colorectal and they are talking about planning to close enterocutaneous fistula in hopefully the coming months as she continues to lose appropriate wt which she is very happy about. States she is doing a lot of walking for exercise, pt reports being vitamin compliant. Denies any n,v,c,d, reflux, abd pain. Pt to speak with nutrition to assess intake. \par

## 2021-09-03 ENCOUNTER — APPOINTMENT (OUTPATIENT)
Dept: COLORECTAL SURGERY | Facility: CLINIC | Age: 36
End: 2021-09-03
Payer: MEDICAID

## 2021-09-03 VITALS
HEART RATE: 60 BPM | SYSTOLIC BLOOD PRESSURE: 124 MMHG | TEMPERATURE: 97 F | WEIGHT: 242 LBS | DIASTOLIC BLOOD PRESSURE: 84 MMHG | OXYGEN SATURATION: 97 % | BODY MASS INDEX: 42.87 KG/M2

## 2021-09-03 PROCEDURE — 99215 OFFICE O/P EST HI 40 MIN: CPT

## 2021-09-03 NOTE — PHYSICAL EXAM
[Exam Deferred] : exam was deferred [Respiratory Effort] : normal respiratory effort [Normal Rate and Rhythm] : normal rate and rhythm [No Edema] : No edema [Alert] : alert [Oriented to Person] : oriented to person [Oriented to Place] : oriented to place [Oriented to Time] : oriented to time [Calm] : calm [JVD] : no jugular venous distention  [Wheezing] : no wheezing was heard [de-identified] : soft, NT, ND; L hemiabdominal stoma patent with gas and stool in the bag; inferior low midline abdominal sinus tract with scant serosanguinous drainage [de-identified] : No obvious deformity [de-identified] : female of stated age in NAD

## 2021-09-03 NOTE — HISTORY OF PRESENT ILLNESS
[FreeTextEntry1] : Pt is a 36 yo Female w/ colocutaneous fistula s/p transverse loop colostomy in November 2019. She is here for follow up after undergoing bariatric surgery with Dr. Chaparro in April. States has been doing well, down approx. 80 pounds to the 240s. States fistula output has improved in quantity, with scant drainage of serosanguinous fluid per day (<1 ABD per day). Denies any N/V, diarrhea per rectum or stoma issues. Denies fever/chills or dysuria/hematuria. Has been adhering to her bariatric diet and is slowly improving her level of physical activity.\par \par Drainage has greatly diminished.\par \par

## 2021-09-03 NOTE — ASSESSMENT
[FreeTextEntry1] : A/P Colocutaneous (vs enterocutaneous) fistula. Suspect former as the output has stopped.  Minimal drainage. Will follow.  Asymptomatic.  No need for CT presently.\par \par Has lost 80 lb after obesity operation.  Suggested she loose 40-50 lb more prior to surgery for the fistula. \par \par Operation would be open one and is likely to associated with high morbidity and decent mortality.  \par Ileostomy or same colostomy would be needed post definitiive operation.  \par \par Need to loose more weight stressed.  She is determined to do so.\par Exercise advised.  \par \par

## 2021-09-12 NOTE — PATIENT PROFILE ADULT - DO YOU FEEL UNSAFE AT HOME, WORK, OR SCHOOL?
60 y.o. F, pmh htn and asthma presenting for L ankle pain. Reports walking on sidewalk at 6:30 am, twisted ankle on uneven sidewalk. Denies falling, head trauma, loc, blood thinner usage. Was able to limp down to friends house, unable to climb steps and called 911. Took advil pta. Denies swelling, but endorsing some painful rom. no

## 2022-04-18 ENCOUNTER — APPOINTMENT (OUTPATIENT)
Dept: COLORECTAL SURGERY | Facility: CLINIC | Age: 37
End: 2022-04-18
Payer: MEDICAID

## 2022-04-18 VITALS
WEIGHT: 191 LBS | SYSTOLIC BLOOD PRESSURE: 136 MMHG | HEART RATE: 68 BPM | HEIGHT: 63 IN | OXYGEN SATURATION: 99 % | BODY MASS INDEX: 33.84 KG/M2 | DIASTOLIC BLOOD PRESSURE: 92 MMHG

## 2022-04-18 PROCEDURE — 99215 OFFICE O/P EST HI 40 MIN: CPT

## 2022-04-18 RX ORDER — NYSTATIN 100000 1/G
100000 POWDER TOPICAL
Qty: 15 | Refills: 3 | Status: DISCONTINUED | COMMUNITY
Start: 2019-11-15 | End: 2022-04-18

## 2022-04-18 RX ORDER — ERGOCALCIFEROL 1.25 MG/1
1.25 MG CAPSULE ORAL
Qty: 12 | Refills: 0 | Status: DISCONTINUED | COMMUNITY
Start: 2021-01-27 | End: 2022-04-18

## 2022-04-18 RX ORDER — ERGOCALCIFEROL 1.25 MG/1
1.25 MG CAPSULE, LIQUID FILLED ORAL
Qty: 12 | Refills: 0 | Status: DISCONTINUED | COMMUNITY
Start: 2021-01-27 | End: 2022-04-18

## 2022-04-18 NOTE — PHYSICAL EXAM
[Normal Breath Sounds] : Normal breath sounds [Normal Heart Sounds] : normal heart sounds [2+] : left 2+ [No HSM] : no hepatosplenomegaly [Alert] : alert [Calm] : calm [JVD] : no jugular venous distention  [Carotid Bruits] : no carotid bruits [Abdominal Masses] : No abdominal masses [Abdomen Tenderness] : ~T ~M No abdominal tenderness [de-identified] : Well appearing, no acute distress [de-identified] : Soft, NT, ND, stoma pink and healthy with stool in appliance and colostomy, site of previous suspected colocutaneous fistula in lower midline area with skin dimple, no drainage. Mild erythema beneath the abdominal skin folds on the left side.

## 2022-04-18 NOTE — ASSESSMENT
[FreeTextEntry1] : 35 yo Female hx ovarian cystectomy c/b colocutaneous fistula s/p transverse loop colostomy in November 2019 and lap sleeve gastrectomy in April 2021 now presenting for follow up and possible colostomy reversal.\par \par \par No fistula output noted x long time,\par S/p sleeve gastrectomy with good results ([facility]).  Has lost significant weight.\par Discussed possible LAR vs sigmoid resection and ? takedown closure of transverse loop stoma (and ? ileostomy).  Would be open procedure.\par Need GYN to be present (will ask Dr. Boogie to see patient) at surgery\par Need  stents and bladder evaluation (bladder may be involved with the fistula or infection).\par Risks of bladder, ureteral injury, need for partial bladder resection, possible salpingo-oophorectomy vs hysterectomy. \par BMP, CBC to be done\par Repeat CT scan of abdomen and pelvis with oral, rectal, and IV contrast\par Colonoscopy: will schedule with us (via stoma and anus).\par \par \par
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
## 2022-04-26 LAB
ANION GAP SERPL CALC-SCNC: 13 MMOL/L
BASOPHILS # BLD AUTO: 0.01 K/UL
BASOPHILS NFR BLD AUTO: 0.1 %
BUN SERPL-MCNC: 9 MG/DL
CALCIUM SERPL-MCNC: 9.8 MG/DL
CHLORIDE SERPL-SCNC: 101 MMOL/L
CO2 SERPL-SCNC: 26 MMOL/L
CREAT SERPL-MCNC: 0.65 MG/DL
EGFR: 118 ML/MIN/1.73M2
EOSINOPHIL # BLD AUTO: 0.15 K/UL
EOSINOPHIL NFR BLD AUTO: 2.1 %
GLUCOSE SERPL-MCNC: 96 MG/DL
HCT VFR BLD CALC: 43.6 %
HGB BLD-MCNC: 14.2 G/DL
IMM GRANULOCYTES NFR BLD AUTO: 0.1 %
LYMPHOCYTES # BLD AUTO: 1.47 K/UL
LYMPHOCYTES NFR BLD AUTO: 20.3 %
MAN DIFF?: NORMAL
MCHC RBC-ENTMCNC: 27.9 PG
MCHC RBC-ENTMCNC: 32.6 GM/DL
MCV RBC AUTO: 85.7 FL
MONOCYTES # BLD AUTO: 0.44 K/UL
MONOCYTES NFR BLD AUTO: 6.1 %
NEUTROPHILS # BLD AUTO: 5.17 K/UL
NEUTROPHILS NFR BLD AUTO: 71.3 %
PLATELET # BLD AUTO: 175 K/UL
POTASSIUM SERPL-SCNC: 4.5 MMOL/L
RBC # BLD: 5.09 M/UL
RBC # FLD: 13.5 %
SODIUM SERPL-SCNC: 140 MMOL/L
WBC # FLD AUTO: 7.25 K/UL

## 2022-04-27 ENCOUNTER — RESULT REVIEW (OUTPATIENT)
Age: 37
End: 2022-04-27

## 2022-04-27 ENCOUNTER — APPOINTMENT (OUTPATIENT)
Dept: COLORECTAL SURGERY | Facility: AMBULATORY SURGERY CENTER | Age: 37
End: 2022-04-27
Payer: MEDICAID

## 2022-04-27 PROCEDURE — 45380 COLONOSCOPY AND BIOPSY: CPT

## 2022-04-29 ENCOUNTER — APPOINTMENT (OUTPATIENT)
Dept: CT IMAGING | Facility: HOSPITAL | Age: 37
End: 2022-04-29

## 2022-05-03 ENCOUNTER — NON-APPOINTMENT (OUTPATIENT)
Age: 37
End: 2022-05-03

## 2022-05-03 ENCOUNTER — APPOINTMENT (OUTPATIENT)
Dept: CT IMAGING | Facility: HOSPITAL | Age: 37
End: 2022-05-03

## 2022-05-18 ENCOUNTER — APPOINTMENT (OUTPATIENT)
Dept: GYNECOLOGIC ONCOLOGY | Facility: CLINIC | Age: 37
End: 2022-05-18

## 2022-05-23 ENCOUNTER — APPOINTMENT (OUTPATIENT)
Dept: GYNECOLOGIC ONCOLOGY | Facility: CLINIC | Age: 37
End: 2022-05-23

## 2022-05-31 ENCOUNTER — APPOINTMENT (OUTPATIENT)
Dept: CT IMAGING | Facility: CLINIC | Age: 37
End: 2022-05-31
Payer: MEDICAID

## 2022-05-31 ENCOUNTER — OUTPATIENT (OUTPATIENT)
Dept: OUTPATIENT SERVICES | Facility: HOSPITAL | Age: 37
LOS: 1 days | End: 2022-05-31
Payer: MEDICAID

## 2022-05-31 DIAGNOSIS — Z00.8 ENCOUNTER FOR OTHER GENERAL EXAMINATION: ICD-10-CM

## 2022-05-31 DIAGNOSIS — Z98.890 OTHER SPECIFIED POSTPROCEDURAL STATES: Chronic | ICD-10-CM

## 2022-05-31 PROCEDURE — 74177 CT ABD & PELVIS W/CONTRAST: CPT

## 2022-05-31 PROCEDURE — 74177 CT ABD & PELVIS W/CONTRAST: CPT | Mod: 26

## 2022-06-06 ENCOUNTER — NON-APPOINTMENT (OUTPATIENT)
Age: 37
End: 2022-06-06

## 2022-06-13 ENCOUNTER — NON-APPOINTMENT (OUTPATIENT)
Age: 37
End: 2022-06-13

## 2022-06-13 ENCOUNTER — APPOINTMENT (OUTPATIENT)
Dept: GYNECOLOGIC ONCOLOGY | Facility: CLINIC | Age: 37
End: 2022-06-13

## 2022-06-13 VITALS
WEIGHT: 189 LBS | BODY MASS INDEX: 33.49 KG/M2 | DIASTOLIC BLOOD PRESSURE: 77 MMHG | OXYGEN SATURATION: 99 % | HEART RATE: 68 BPM | HEIGHT: 63 IN | TEMPERATURE: 98 F | SYSTOLIC BLOOD PRESSURE: 129 MMHG

## 2022-06-13 DIAGNOSIS — Z00.00 ENCOUNTER FOR GENERAL ADULT MEDICAL EXAMINATION W/OUT ABNORMAL FINDINGS: ICD-10-CM

## 2022-06-13 PROCEDURE — XXXXX: CPT | Mod: 1L

## 2022-06-15 LAB
CYTOLOGY CVX/VAG DOC THIN PREP: NORMAL
HPV HIGH+LOW RISK DNA PNL CVX: NOT DETECTED

## 2022-06-28 ENCOUNTER — APPOINTMENT (OUTPATIENT)
Dept: UROLOGY | Facility: CLINIC | Age: 37
End: 2022-06-28

## 2022-10-21 ENCOUNTER — APPOINTMENT (OUTPATIENT)
Dept: COLORECTAL SURGERY | Facility: CLINIC | Age: 37
End: 2022-10-21

## 2022-10-21 VITALS
SYSTOLIC BLOOD PRESSURE: 123 MMHG | OXYGEN SATURATION: 98 % | HEIGHT: 63 IN | TEMPERATURE: 98 F | BODY MASS INDEX: 31.36 KG/M2 | DIASTOLIC BLOOD PRESSURE: 72 MMHG | WEIGHT: 177 LBS | HEART RATE: 50 BPM

## 2022-10-21 PROCEDURE — 99214 OFFICE O/P EST MOD 30 MIN: CPT

## 2022-10-21 NOTE — HISTORY OF PRESENT ILLNESS
[FreeTextEntry1] : Here for  discussion regarding planned upcoming operation.\par Patient has fistula that at one time involved the colon, bladder, and pelvic abscess that she developed spontaneously and that led to pelvic surgery by GYN surgeons (that led to fistula).  Patient was diverted almost 2 years back when fistula output was daily.  When diverted the output decreased and then ceased.\par \par Since then the patient has lost over 100 lbs.  She wants colostomy closure if possible.  \par \par As she has clearly a very complex hx and anatomy a multidisciplinary team will be needed and would include GYN (Dr. Short) and also  (patient not seen urologist yet.

## 2022-10-21 NOTE — ASSESSMENT
[FreeTextEntry1] : See  history above.  \par Patients exam reveals hard indurated suprapubic area and dry fistula opening.\par Open exploratory lap planned and assessment of situation.  If fistula can be taken down safely (+/- colectomy, ESTEPHANIA +/- BSO, possible partial cystectomy) then we would proceed.  If it is judged to be too dangerous then we might back out and leave patient with colostomy for time being.   She understands that surgery might be assoicated with severe complications & consequences.  Need for GYN. , and colorectal team explained and understood.  Risks of infection, bladder, vascular, colonic injury, possibole need for colectomy, continued diversion understood.  \par \par Patient has lost much weight and has also had chance to live with the stoma.  She can see a lift, if surgery proves not doable, with the stoma permanently.\par \par Given names of Dr. Kan Dunbar and also Filippo Albert as ASIYA LACKEY's to see.  \par Need to find date when all teams can be present.  \par \par \par

## 2022-10-25 ENCOUNTER — APPOINTMENT (OUTPATIENT)
Dept: UROLOGY | Facility: CLINIC | Age: 37
End: 2022-10-25

## 2022-10-25 LAB
BILIRUB UR QL STRIP: NORMAL
CLARITY UR: CLEAR
COLLECTION METHOD: NORMAL
GLUCOSE UR-MCNC: NORMAL
HCG UR QL: 1 EU/DL
HGB UR QL STRIP.AUTO: NORMAL
KETONES UR-MCNC: NORMAL
LEUKOCYTE ESTERASE UR QL STRIP: NORMAL
NITRITE UR QL STRIP: NORMAL
PH UR STRIP: 6.5
PROT UR STRIP-MCNC: NORMAL
SP GR UR STRIP: 1.02

## 2022-10-25 PROCEDURE — 99204 OFFICE O/P NEW MOD 45 MIN: CPT

## 2022-10-25 NOTE — HISTORY OF PRESENT ILLNESS
[FreeTextEntry1] : 37 yo female referred for possible bladder involvement in pelvic abscess / enterocutaneous fistula.  She has a complicated surgical history which started in 2020 with a tuboovarian abscess.  After several surgical interventions she developed an enterocutaneous fistula.  She underwent colonic diversion in November of 2020.  She subsequently underwent gastric sleeve bariatric surgery to be optimized for repair of the fistula and colonic reanastomosis.  She has lost about 150 lbs since her bariatric surgery.  Prior to colonic diversion she notes urinary symptoms that were consistent with colovesical fistula including feculent urine, and pneumaturia.  This has all resolved over the last 2 years.  However, a CT scan from May 2022 does show small pockets of air in the bladder which would suggest the persistence of a small fistula.  The CT also noted the presence of a large parastomal hernia.  The kidneys appeared normal on this scan with no hydronephrosis suggesting the ureters are not involved in this pelvic process.  \par \par She is planning to have joint surgery with colorectal surgery and Gyn for repair of her fistula, reversal of her colostomy, and hysterectomy.  She was referred here for likely bladder involvement.

## 2022-10-25 NOTE — ASSESSMENT
[FreeTextEntry1] : 37 yo female with colocutaneous fistula and possible persistent colovesical fistula.  I will be present to aid colorectal surgery and Gyn in their portion of the procedure.  I explained to the patient that I may have to resect part of the bladder and/or repair cystotomies in the course of the procedure which will require her to have a solis catheter in lace for 2 weeks after surgery.  I also explained that I will like place ureteral catheters for the procedure to aid in ureteral identification.  \par \par Lastly, I would like to get a CT scan to assess for whether there is persistent air in the bladder as was suggested on the Scan from late May.  \par \par Plan:\par 1. Coordinate surgery with Kamar Saeed and Chuyita\par 2. CT urogram as well as oral contrast\par

## 2022-10-25 NOTE — PHYSICAL EXAM
[General Appearance - Well Developed] : well developed [Normal Appearance] : normal appearance [Heart Rate And Rhythm] : Heart rate and rhythm were normal [] : no respiratory distress [Abdomen Soft] : soft [Normal Station and Gait] : the gait and station were normal for the patient's age [Skin Color & Pigmentation] : normal skin color and pigmentation [No Focal Deficits] : no focal deficits [Oriented To Time, Place, And Person] : oriented to person, place, and time [FreeTextEntry1] : colostomy in place, parastomal hernia palpated with no tenderness

## 2022-10-26 LAB
APPEARANCE: ABNORMAL
BACTERIA: ABNORMAL
BILIRUBIN URINE: NEGATIVE
BLOOD URINE: ABNORMAL
COLOR: YELLOW
GLUCOSE QUALITATIVE U: NEGATIVE
HYALINE CASTS: 0 /LPF
KETONES URINE: NEGATIVE
LEUKOCYTE ESTERASE URINE: ABNORMAL
MICROSCOPIC-UA: NORMAL
NITRITE URINE: NEGATIVE
PH URINE: 7
PROTEIN URINE: NORMAL
RED BLOOD CELLS URINE: 330 /HPF
SPECIFIC GRAVITY URINE: 1.02
SQUAMOUS EPITHELIAL CELLS: 2 /HPF
UROBILINOGEN URINE: NORMAL
WHITE BLOOD CELLS URINE: 217 /HPF

## 2022-10-27 LAB — BACTERIA UR CULT: NORMAL

## 2022-11-14 ENCOUNTER — APPOINTMENT (OUTPATIENT)
Dept: CT IMAGING | Facility: CLINIC | Age: 37
End: 2022-11-14

## 2022-11-14 ENCOUNTER — OUTPATIENT (OUTPATIENT)
Dept: OUTPATIENT SERVICES | Facility: HOSPITAL | Age: 37
LOS: 1 days | End: 2022-11-14

## 2022-11-14 ENCOUNTER — RESULT REVIEW (OUTPATIENT)
Age: 37
End: 2022-11-14

## 2022-11-14 DIAGNOSIS — Z98.890 OTHER SPECIFIED POSTPROCEDURAL STATES: Chronic | ICD-10-CM

## 2022-11-14 PROCEDURE — 74178 CT ABD&PLV WO CNTR FLWD CNTR: CPT | Mod: 26

## 2022-12-12 ENCOUNTER — APPOINTMENT (OUTPATIENT)
Dept: COLORECTAL SURGERY | Facility: CLINIC | Age: 37
End: 2022-12-12

## 2022-12-12 VITALS
HEART RATE: 68 BPM | TEMPERATURE: 98.42 F | DIASTOLIC BLOOD PRESSURE: 78 MMHG | SYSTOLIC BLOOD PRESSURE: 119 MMHG | HEIGHT: 63 IN | OXYGEN SATURATION: 98 % | BODY MASS INDEX: 31.09 KG/M2 | RESPIRATION RATE: 16 BRPM | WEIGHT: 175.44 LBS

## 2022-12-12 DIAGNOSIS — N73.9 FEMALE PELVIC INFLAMMATORY DISEASE, UNSPECIFIED: ICD-10-CM

## 2022-12-12 PROCEDURE — 99215 OFFICE O/P EST HI 40 MIN: CPT

## 2022-12-12 RX ORDER — PANTOPRAZOLE 40 MG/1
40 TABLET, DELAYED RELEASE ORAL
Qty: 30 | Refills: 0 | Status: DISCONTINUED | COMMUNITY
Start: 2021-09-27 | End: 2022-12-12

## 2022-12-12 NOTE — REVIEW OF SYSTEMS
[Negative] : Heme/Lymph [Fever] : no fever [Chills] : no chills [Feeling Poorly] : not feeling poorly [Feeling Tired] : not feeling tired [Recent Weight Gain (___ Lbs)] : no recent weight gain [Recent Weight Loss (___ Lbs)] : no recent weight loss [Eye Pain] : no eye pain [Red Eyes] : eyes not red [Eyesight Problems] : no eyesight problems [Discharge From Eyes] : no purulent discharge from the eyes [Dry Eyes] : no dryness of the eyes [Eyes Itch] : no itching of the eyes [Earache] : no earache [Loss Of Hearing] : no hearing loss [Nosebleeds] : no nosebleeds [Nasal Discharge] : no nasal discharge [Sore Throat] : no sore throat [Hoarseness] : no hoarseness [Heart Rate Is Slow] : the heart rate was not slow [Heart Rate Is Fast] : the heart rate was not fast [Chest Pain] : no chest pain [Palpitations] : no palpitations [Leg Claudication] : no intermittent leg claudication [Lower Ext Edema] : no lower extremity edema [Shortness Of Breath] : no shortness of breath [Wheezing] : no wheezing [Cough] : no cough [SOB on Exertion] : no shortness of breath during exertion [Orthopnea] : no orthopnea [PND] : no PND [Abdominal Pain] : no abdominal pain [Vomiting] : no vomiting [Constipation] : no constipation [Diarrhea] : no diarrhea [Heartburn] : no heartburn [Melena] : no melena [Dysuria] : no dysuria [Incontinence] : no incontinence [Pelvic Pain] : no pelvic pain [Dysmenorrhea] : no dysmenorrhea [Vaginal Discharge] : no vaginal discharge [Abn Vaginal Bleeding] : no unexplained vaginal bleeding [Arthralgias] : no arthralgias [Joint Swelling] : no joint swelling [Joint Stiffness] : no joint stiffness [Limb Pain] : no limb pain [Limb Swelling] : no limb swelling [Skin Lesions] : no skin lesions [Skin Wound] : no skin wound [Itching] : no itching [Change In A Mole] : no change in a mole [Breast Pain] : no breast pain [Breast Lump] : no breast lump [Confused] : no confusion [Convulsions] : no convulsions [Dizziness] : no dizziness [Fainting] : no fainting [Limb Weakness] : no limb weakness [Difficulty Walking] : no difficulty walking [Easy Bleeding] : no tendency for easy bleeding [Easy Bruising] : no tendency for easy bruising

## 2022-12-12 NOTE — ASSESSMENT
[FreeTextEntry1] : This is a 38 yo patient who had an unclear GYN procedure that led to a severe pelvic infection and colcutaneous and vesicocutaneous fistulas (that have stopped raining post diversion and that have not been demonstrated on recent scans..  I first saw her in late fall 2019 at which point I diverted her with transverse loop colostomy.  She went on to get sleeve gastrectomy in April 2021.   We are gearing up for a laparotomy and possible colectomy, ESTEPHANIA BSO and ? bladder resection/repair.  Colonoscopy done April 2022 and was ok.  Nena ordered a CT scan (done November 14) to look for bladder fistulas and it showed none of those but did show a bizarre LLQ process. Multiple stacked, radio-opaque spicules surrounded by an inflammatory mass that is sizable and that has grown notably since the last CT scan done on May 31, 2022.  The small bowel, its mesentery and the colon and its mesentery have been pulled into this process.  Also, a left adnexal process may be connected as well.   Clinically, she has no pain, fever, distension, N/V, vaginal d/c, bladder symptoms, etc.  On abdominal exam her suprapubic area is hard as a rock and it feels like there is a mass or large induration in that area.\par \par I don’t know what is going on and am surprised why the process is getting bigger while she is diverted and asymptomatic.  No fever and no tenderness.  Have asked GYN Onco and  teams to look at the November 2022 and the May 31, 2022 scans.  I am afraid we are going to find a solid large block of tissue involved SB, Colon, left adnexa, plus the bladder.   Resecting this mass may be quite destructive and a very big operation.  On the other hand the inflammatory process is worsening so we need to investigate and figure out what is going on. Will get input.\par \par Chance that resection of entire problem at this time will be associated with a high morbidity and mortality explained.  Chance that team may decide not to do resection at this time mentioned.  May leave her with loop transverse colostomy or may build a SB ileostomy if left colectomy and mass excision plus gyn procedure carried out.  Dr. Albert to do cystoscopy and to place ureteral stents.  \par \par Risks of infection (abscess, leak, perforation, transfusion, bleeding, ureteral or bladder injury, etc, etc discussed)\par Spent long time explaining situation to patient and significant other.  Risk of ileostomy or of new stoma understood. \par \par Total time spent: 65 minutes.  \par

## 2022-12-12 NOTE — HISTORY OF PRESENT ILLNESS
[FreeTextEntry1] : 36 y/o F, here to talk about surgery. \par See other notes for her full hx.\par In February 2019 developed low pelvic pain.  W/u revealed 9.1 cm complex left adnexal mass and  TOA with SB involvement.  Attempted percutaneous drainage but t failed.  Interval CT showed interloop abscesses and ? fistula to the sigmoid colon. Abscess was 11 x 7.9 x 9.7 cm inApril 2019.   Had surgery in June 2019 had a pelvic/ovarian cyst removed (benign path). Developed late pelvic abscess that drained spontaneously.  Return to OR and had debridement.  Beame fistula.  Much antibiotics.  We saw her and diverted her in fall 2019.\par \par Been diverted since.  Had sleeve gastrectomy.  Lost weight.  Had colonoscopy April 2022 that showed no neoplasm and was able to fully examine the colon (diverted plus the in circuit part).   Had CT scan done in May 21, 2022 (abdomen/pelvis) that showed spiculated ? inflammatory mass and ? connection to left adnexal mass.   Patient wants closure. Has seen Dr.'s Short (GYN) and Bety ().  Dr. Albert ordered CT to look for bladder fistula (vs other) and that scan was done November 4, 2022.  Despite no clinical change that scan shows that the inflamed area has increased notably and the spiculations have grown and the size of the surrounding induration has increased greatly from the May 31, 2022 scan; also, small bowel and its mesentery and colon mesnetery and colon are pulled into this mass.  No bowel obstruciton of dilated loops marty.  NO fistula to bladder noted.   No drainage from skin opening x more than 12 month.\par    \par Today, Denies abdominal pain, fever, chills. Colostomy is functioning well. Empties it twice a day. Not a lot of gas.  Sometime has some drainage from anus. Otherwise doing well. Denies any urinary problems. Has lost much weight since obesity operation.  She wants closure. If it is possible and warranted this op may require colecotmy, SB resection,ESTEPHANIA BSO, resetion of pelvic mass and bladder resection.  Has been told on several visits to office that there is a chance that we will not be able to do the resection (so she would continue with the stoma as id) due to severe adhesions or phlegmon block of tissue that does not permit distinguishing of planes.  She understands. \par \par \par PSH\par pelvic cyst excsiion June 2019\par loop colostomy formation  11/05/19, [facility]\par Sleeve gastrectomy  4/21/21, [facility]\par Had \par \par \par

## 2022-12-12 NOTE — PHYSICAL EXAM
[Abdomen Masses] : Abdominal masses present [Exam Deferred] : exam was deferred [Normal Breath Sounds] : Normal breath sounds [Normal Heart Sounds] : normal heart sounds [2+] : right 2+ [Alert] : alert [Oriented to Person] : oriented to person [Oriented to Place] : oriented to place [Oriented to Time] : oriented to time [Calm] : calm [Abdomen Tenderness] : ~T No ~M abdominal tenderness [de-identified] : not distended.  LUQ colostomy with parastomal hernia.  Lower abdominal incisions.  low transverse incision with 4 mm sized opening in skin (fistula site) that has minimall output.   Definite mass effect/induration in the suprapubic area L > R.  Not tender.   BS + [de-identified] : Did colonoscopy April 27th 2022.  No distal rectal/anal path noted.  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

## 2023-01-09 ENCOUNTER — APPOINTMENT (OUTPATIENT)
Dept: GYNECOLOGIC ONCOLOGY | Facility: CLINIC | Age: 38
End: 2023-01-09
Payer: MEDICAID

## 2023-01-09 ENCOUNTER — NON-APPOINTMENT (OUTPATIENT)
Age: 38
End: 2023-01-09

## 2023-01-09 VITALS
OXYGEN SATURATION: 98 % | DIASTOLIC BLOOD PRESSURE: 70 MMHG | BODY MASS INDEX: 30.83 KG/M2 | HEART RATE: 72 BPM | TEMPERATURE: 97.5 F | HEIGHT: 63 IN | SYSTOLIC BLOOD PRESSURE: 127 MMHG | WEIGHT: 174 LBS

## 2023-01-09 PROCEDURE — 99214 OFFICE O/P EST MOD 30 MIN: CPT

## 2023-01-10 NOTE — LETTER BODY
[Dear  ___] : Dear  [unfilled], [Dear  ___] : Dear ~CHAVA, [Attached please find my note.] : Attached please find my note.

## 2023-01-17 DIAGNOSIS — Z79.2 LONG TERM (CURRENT) USE OF ANTIBIOTICS: ICD-10-CM

## 2023-01-18 ENCOUNTER — LABORATORY RESULT (OUTPATIENT)
Age: 38
End: 2023-01-18

## 2023-01-19 ENCOUNTER — OUTPATIENT (OUTPATIENT)
Dept: OUTPATIENT SERVICES | Facility: HOSPITAL | Age: 38
LOS: 1 days | End: 2023-01-19
Payer: MEDICAID

## 2023-01-19 DIAGNOSIS — Z01.818 ENCOUNTER FOR OTHER PREPROCEDURAL EXAMINATION: ICD-10-CM

## 2023-01-19 DIAGNOSIS — Z98.890 OTHER SPECIFIED POSTPROCEDURAL STATES: Chronic | ICD-10-CM

## 2023-01-19 LAB
ALBUMIN SERPL ELPH-MCNC: 4.3 G/DL
ALP BLD-CCNC: 75 U/L
ALT SERPL-CCNC: 8 U/L
ANION GAP SERPL CALC-SCNC: 12 MMOL/L
AST SERPL-CCNC: 12 U/L
BASOPHILS # BLD AUTO: 0.03 K/UL
BASOPHILS NFR BLD AUTO: 0.4 %
BILIRUB SERPL-MCNC: 0.3 MG/DL
BUN SERPL-MCNC: 11 MG/DL
CALCIUM SERPL-MCNC: 9.4 MG/DL
CHLORIDE SERPL-SCNC: 100 MMOL/L
CO2 SERPL-SCNC: 26 MMOL/L
CREAT SERPL-MCNC: 0.65 MG/DL
EGFR: 117 ML/MIN/1.73M2
EOSINOPHIL # BLD AUTO: 0.19 K/UL
EOSINOPHIL NFR BLD AUTO: 2.5 %
GLUCOSE SERPL-MCNC: 102 MG/DL
HCG SERPL-MCNC: <1 MIU/ML
HCT VFR BLD CALC: 40.5 %
HGB BLD-MCNC: 12.5 G/DL
IMM GRANULOCYTES NFR BLD AUTO: 0.3 %
INR PPP: 1.1 RATIO
LYMPHOCYTES # BLD AUTO: 1.12 K/UL
LYMPHOCYTES NFR BLD AUTO: 15 %
MAN DIFF?: NORMAL
MCHC RBC-ENTMCNC: 25.6 PG
MCHC RBC-ENTMCNC: 30.9 GM/DL
MCV RBC AUTO: 83 FL
MONOCYTES # BLD AUTO: 0.53 K/UL
MONOCYTES NFR BLD AUTO: 7.1 %
NEUTROPHILS # BLD AUTO: 5.57 K/UL
NEUTROPHILS NFR BLD AUTO: 74.7 %
PLATELET # BLD AUTO: 237 K/UL
POTASSIUM SERPL-SCNC: 4.7 MMOL/L
PROT SERPL-MCNC: 7.6 G/DL
PT BLD: 12.9 SEC
RBC # BLD: 4.88 M/UL
RBC # FLD: 14 %
SODIUM SERPL-SCNC: 138 MMOL/L
WBC # FLD AUTO: 7.46 K/UL

## 2023-01-19 PROCEDURE — 71046 X-RAY EXAM CHEST 2 VIEWS: CPT | Mod: 26

## 2023-01-20 ENCOUNTER — NON-APPOINTMENT (OUTPATIENT)
Age: 38
End: 2023-01-20

## 2023-01-20 LAB
APPEARANCE: ABNORMAL
BILIRUBIN URINE: NEGATIVE
BLOOD URINE: ABNORMAL
COLOR: YELLOW
GLUCOSE QUALITATIVE U: NEGATIVE
KETONES URINE: NEGATIVE
LEUKOCYTE ESTERASE URINE: ABNORMAL
NITRITE URINE: NEGATIVE
PH URINE: 8
PROTEIN URINE: ABNORMAL
SPECIFIC GRAVITY URINE: 1.03
UROBILINOGEN URINE: ABNORMAL

## 2023-01-20 RX ORDER — NEOMYCIN SULFATE 500 MG/1
500 TABLET ORAL
Qty: 6 | Refills: 0 | Status: DISCONTINUED | COMMUNITY
Start: 2023-01-17 | End: 2023-01-20

## 2023-01-23 ENCOUNTER — NON-APPOINTMENT (OUTPATIENT)
Age: 38
End: 2023-01-23

## 2023-01-25 ENCOUNTER — TRANSCRIPTION ENCOUNTER (OUTPATIENT)
Age: 38
End: 2023-01-25

## 2023-01-25 RX ORDER — INFLUENZA VIRUS VACCINE 15; 15; 15; 15 UG/.5ML; UG/.5ML; UG/.5ML; UG/.5ML
0.5 SUSPENSION INTRAMUSCULAR ONCE
Refills: 0 | Status: DISCONTINUED | OUTPATIENT
Start: 2023-01-26 | End: 2023-01-28

## 2023-01-25 NOTE — H&P ADULT - HISTORY OF PRESENT ILLNESS
PS  pelvic cyst excsiion June 2019  loop colostomy formation 11/05/19, [facility]  Sleeve gastrectomy 4/21/21, [place]  Had    This is a 38 yo lady who had Left large ovarian abscess in 2019 with initial percutaneous drainage and later and [facility] She developed colocutaneous fistula and  had symptoms colo-vesicular fistula .  She had a transverse colostomy. She has not had any skin discharge for a year .  She does not have any urinary symptoms post diversion.    Subsequent colonoscopy did not show any luminal lesion.   Repeat CT scan showed interval increase in pelvic mass . Patient symptomatically well.  She has lost about 200lbs ; she had  sleeve gastrectomy.  She was seen in clinic and requested for stoma closure.  She has been seen in clinic and advised that  Surgery may involve resection of  multiple organs ( uterus, bladder and bowel) and may not be possible even.    She is here for colostomy reversal with Gyne /Urology input.         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   4) Sleeve Gastrectomy with 200lbs weight loss    MED: none  ALL: Zosyn: Rash      PSH  pelvic cyst excsiion June 2019  loop colostomy formation 11/05/19, [facility]  Sleeve gastrectomy 4/21/21, [place]  Had

## 2023-01-25 NOTE — H&P ADULT - NSHPLABSRESULTS_GEN_ALL_CORE
This is a 36yo lady s/p Transverse  loop colostomy due to pelvic inflamatory mass after surgery for left ovarian abscsces. She had colocutanous and colovesicular fistula prior  to diversion. Repeat scan showed interval increase in the pelvic mass despite being well clinically. She is here for an attempt to reverse her colostomy  and exploration of her pelvic mass with the assistance of Gyn service with possible bowel resection, possible ESTEPHANIA/BSO , bladder resection etc.    Urology service has been requested for insertion of ureteral stents.  1. Consent 2. Heparin 5000U sc  3.Type/Screen

## 2023-01-25 NOTE — H&P ADULT - NSHPPHYSICALEXAM_GEN_ALL_CORE
Gastrointestinal: Abdominal masses present. No abdominal tenderness. not distended. LUQ colostomy with parastomal hernia. Lower abdominal incisions. low transverse incision with 4 mm sized opening in skin (fistula site) that has minimall output. Definite mass effect/induration in the suprapubic area L > R. Not tender. BS +.   Rectal : exam was deferred . Did colonoscopy April 27th 2022. No distal rectal/anal path noted. 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   General Appearance: NAD.   Respiratory: Normal breath sounds.   Cardiovascular: normal heart sounds. Dorsalis pedis: right 2+.   Neurologic: alert, oriented to person, oriented to place and oriented to time.   Psychiatric: calm.

## 2023-01-25 NOTE — H&P ADULT - NSHPREVIEWOFSYSTEMS_GEN_ALL_CORE
Constitutional: no fever, no chills, not feeling poorly, not feeling tired, no recent weight gain and no recent weight loss.   Eyes: no eye pain, eyes not red, no eyesight problems, no purulent discharge from the eyes, no dryness of the eyes and no itching of the eyes.   ENT: no earache, no hearing loss, no nosebleeds, no nasal discharge, no sore throat and no hoarseness.   Cardiovascular: the heart rate was not slow, the heart rate was not fast, no chest pain, no palpitations, no intermittent leg claudication and no lower extremity edema.   Respiratory: no shortness of breath, no wheezing, no cough, no shortness of breath during exertion, no orthopnea and no PND.   Gastrointestinal: no abdominal pain, no vomiting, no constipation, no diarrhea, no heartburn and no melena.   Genitourinary: no dysuria, no incontinence, no pelvic pain, no dysmenorrhea, no vaginal discharge and no unexplained vaginal bleeding.   Musculoskeletal: no arthralgias, no joint swelling, no joint stiffness, no limb pain and no limb swelling.   Integumentary: no skin lesions, no skin wound, no itching, no change in a mole, no breast pain and no breast lump.   Neurological: no confusion, no convulsions, no dizziness, no fainting, no limb weakness and no difficulty walking.   Hematologic/Lymphatic: no tendency for easy bleeding and no tendency for easy bruising.   Constitutional, Eyes, ENT, Cardiovascular, Respiratory, Gastrointestinal, Genitourinary, Musculoskeletal, Integumentary, Neurological, Psychiatric, Endocrine and Heme/Lymph are otherwise negative.

## 2023-01-26 ENCOUNTER — INPATIENT (INPATIENT)
Facility: HOSPITAL | Age: 38
LOS: 1 days | Discharge: HOME | DRG: 661 | End: 2023-01-28
Attending: COLON & RECTAL SURGERY | Admitting: COLON & RECTAL SURGERY
Payer: MEDICAID

## 2023-01-26 ENCOUNTER — RESULT REVIEW (OUTPATIENT)
Age: 38
End: 2023-01-26

## 2023-01-26 ENCOUNTER — APPOINTMENT (OUTPATIENT)
Dept: UROLOGY | Facility: HOSPITAL | Age: 38
End: 2023-01-26

## 2023-01-26 ENCOUNTER — APPOINTMENT (OUTPATIENT)
Dept: COLORECTAL SURGERY | Facility: HOSPITAL | Age: 38
End: 2023-01-26

## 2023-01-26 ENCOUNTER — TRANSCRIPTION ENCOUNTER (OUTPATIENT)
Age: 38
End: 2023-01-26

## 2023-01-26 VITALS
TEMPERATURE: 98 F | SYSTOLIC BLOOD PRESSURE: 103 MMHG | RESPIRATION RATE: 16 BRPM | WEIGHT: 166.67 LBS | OXYGEN SATURATION: 99 % | HEIGHT: 63 IN | HEART RATE: 53 BPM | DIASTOLIC BLOOD PRESSURE: 60 MMHG

## 2023-01-26 DIAGNOSIS — Z90.3 ACQUIRED ABSENCE OF STOMACH [PART OF]: Chronic | ICD-10-CM

## 2023-01-26 DIAGNOSIS — Z98.890 OTHER SPECIFIED POSTPROCEDURAL STATES: Chronic | ICD-10-CM

## 2023-01-26 LAB
BLD GP AB SCN SERPL QL: NEGATIVE — SIGNIFICANT CHANGE UP
GLUCOSE BLDC GLUCOMTR-MCNC: 94 MG/DL — SIGNIFICANT CHANGE UP (ref 70–99)

## 2023-01-26 PROCEDURE — 88305 TISSUE EXAM BY PATHOLOGIST: CPT | Mod: 26

## 2023-01-26 PROCEDURE — 88300 SURGICAL PATH GROSS: CPT | Mod: 26,59

## 2023-01-26 PROCEDURE — 45330 DIAGNOSTIC SIGMOIDOSCOPY: CPT

## 2023-01-26 PROCEDURE — 52005 CYSTO W/URTRL CATHJ: CPT

## 2023-01-26 PROCEDURE — 44005 FREEING OF BOWEL ADHESION: CPT

## 2023-01-26 PROCEDURE — 88304 TISSUE EXAM BY PATHOLOGIST: CPT | Mod: 26

## 2023-01-26 RX ORDER — HYDROMORPHONE HYDROCHLORIDE 2 MG/ML
0.5 INJECTION INTRAMUSCULAR; INTRAVENOUS; SUBCUTANEOUS EVERY 6 HOURS
Refills: 0 | Status: DISCONTINUED | OUTPATIENT
Start: 2023-01-26 | End: 2023-01-27

## 2023-01-26 RX ORDER — HEPARIN SODIUM 5000 [USP'U]/ML
5000 INJECTION INTRAVENOUS; SUBCUTANEOUS ONCE
Refills: 0 | Status: COMPLETED | OUTPATIENT
Start: 2023-01-26 | End: 2023-01-26

## 2023-01-26 RX ORDER — KETOROLAC TROMETHAMINE 30 MG/ML
30 SYRINGE (ML) INJECTION EVERY 6 HOURS
Refills: 0 | Status: DISCONTINUED | OUTPATIENT
Start: 2023-01-26 | End: 2023-01-27

## 2023-01-26 RX ORDER — PANTOPRAZOLE SODIUM 20 MG/1
40 TABLET, DELAYED RELEASE ORAL EVERY 24 HOURS
Refills: 0 | Status: DISCONTINUED | OUTPATIENT
Start: 2023-01-26 | End: 2023-01-28

## 2023-01-26 RX ORDER — SODIUM CHLORIDE 9 MG/ML
1000 INJECTION, SOLUTION INTRAVENOUS
Refills: 0 | Status: DISCONTINUED | OUTPATIENT
Start: 2023-01-26 | End: 2023-01-27

## 2023-01-26 RX ORDER — ACETAMINOPHEN 500 MG
1000 TABLET ORAL ONCE
Refills: 0 | Status: COMPLETED | OUTPATIENT
Start: 2023-01-26 | End: 2023-01-26

## 2023-01-26 RX ORDER — CEFOTETAN DISODIUM 1 G
2 VIAL (EA) INJECTION ONCE
Refills: 0 | Status: COMPLETED | OUTPATIENT
Start: 2023-01-26 | End: 2023-01-26

## 2023-01-26 RX ORDER — ACETAMINOPHEN 500 MG
1000 TABLET ORAL EVERY 6 HOURS
Refills: 0 | Status: DISCONTINUED | OUTPATIENT
Start: 2023-01-26 | End: 2023-01-27

## 2023-01-26 RX ORDER — HEPARIN SODIUM 5000 [USP'U]/ML
5000 INJECTION INTRAVENOUS; SUBCUTANEOUS EVERY 8 HOURS
Refills: 0 | Status: DISCONTINUED | OUTPATIENT
Start: 2023-01-26 | End: 2023-01-28

## 2023-01-26 RX ORDER — HEPARIN SODIUM 5000 [USP'U]/ML
5000 INJECTION INTRAVENOUS; SUBCUTANEOUS EVERY 12 HOURS
Refills: 0 | Status: DISCONTINUED | OUTPATIENT
Start: 2023-01-26 | End: 2023-01-26

## 2023-01-26 RX ADMIN — HEPARIN SODIUM 5000 UNIT(S): 5000 INJECTION INTRAVENOUS; SUBCUTANEOUS at 17:11

## 2023-01-26 RX ADMIN — HYDROMORPHONE HYDROCHLORIDE 0.5 MILLIGRAM(S): 2 INJECTION INTRAMUSCULAR; INTRAVENOUS; SUBCUTANEOUS at 23:33

## 2023-01-26 RX ADMIN — Medication 100 GRAM(S): at 14:37

## 2023-01-26 RX ADMIN — Medication 1000 MILLIGRAM(S): at 06:41

## 2023-01-26 RX ADMIN — HEPARIN SODIUM 5000 UNIT(S): 5000 INJECTION INTRAVENOUS; SUBCUTANEOUS at 06:42

## 2023-01-26 RX ADMIN — HYDROMORPHONE HYDROCHLORIDE 0.5 MILLIGRAM(S): 2 INJECTION INTRAMUSCULAR; INTRAVENOUS; SUBCUTANEOUS at 14:05

## 2023-01-26 RX ADMIN — PANTOPRAZOLE SODIUM 40 MILLIGRAM(S): 20 TABLET, DELAYED RELEASE ORAL at 18:28

## 2023-01-26 RX ADMIN — HYDROMORPHONE HYDROCHLORIDE 0.5 MILLIGRAM(S): 2 INJECTION INTRAMUSCULAR; INTRAVENOUS; SUBCUTANEOUS at 22:33

## 2023-01-26 RX ADMIN — Medication 30 MILLIGRAM(S): at 17:11

## 2023-01-26 RX ADMIN — HYDROMORPHONE HYDROCHLORIDE 0.5 MILLIGRAM(S): 2 INJECTION INTRAMUSCULAR; INTRAVENOUS; SUBCUTANEOUS at 14:20

## 2023-01-26 RX ADMIN — Medication 1000 MILLIGRAM(S): at 06:44

## 2023-01-26 RX ADMIN — SODIUM CHLORIDE 125 MILLILITER(S): 9 INJECTION, SOLUTION INTRAVENOUS at 18:28

## 2023-01-26 NOTE — PRE-OP CHECKLIST - VIA
Patient's PCR COVID result reviewed by Dr. Williamson. Message left on patient's mother's voicemail informing patient of negative result. Encouraged patient's mother to return call to Urgent Care clinic with any questions or concerns.      ambulate

## 2023-01-26 NOTE — CONSULT NOTE ADULT - SUBJECTIVE AND OBJECTIVE BOX
HPI:  This is a 38 yo lady who had Left large ovarian abscess in 2019 with initial percutaneous drainage and later and operative [facility] and [facility] She developed colocutaneous fistula and  had symptoms colo-vesicular fistula .  She had a transverse colostomy. She has not had any skin discharge for a year .  She does not have any urinary symptoms post diversion.    Subsequent colonoscopy did not show any luminal lesion.   Repeat CT scan showed interval increase in pelvic mass . Patient symptomatically well.  She has lost about 200lbs ; she had  sleeve gastrectomy.  She was seen in clinic and requested for stoma closure.  She has been seen in clinic and advised that  Surgery may involve resection of  multiple organs ( uterus, bladder and bowel) and may not be possible even.    She is here for colostomy reversal with Gyne /Urology input.     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   4) Sleeve Gastrectomy with 200lbs weight loss    MED: none  ALL: Zosyn: Rash    [facility]  pelvic cyst excsiion June 2019  loop colostomy formation 11/05/19, [facility]  Sleeve gastrectomy 4/21/21, Lalit Brown    (25 Jan 2023 13:39)       ADDENDUM:  Patient referred to Dr. Albert outpatient for history of possible bladder involvement in pelvic abscess/enterocutaneous fistula s/p colonic diversion 11/2020, with symptoms consistent colovesical fistula including feculent urine and pneumaturia which resolved in last 2 years.     CT 5/2022 with stellate likely inflammatory process in sigmoid mesentery, tethering of rectosigmoid bowel. Fluid-filled tubular structure and left adnexal fluid collection, left lower quadrant colostomy w/ large parastomal hernia, and small droplets of gas in urinary bladder.    CT 11/2022 with worsening spiculated soft tissue with tethering of small bowel and sigmoid colon, no colovesical or enterovesical fistula demonstrated. Moderate loculated ascites in LLQ, and left abdominal loop colostomy w/ parastomal hernia    Dr. Albert asked to assist in planned surgery with Dr. Saeed and Dr. Boogie for possible reversal of colostomy, repair of bladder, and possible hysterectomy      PAST MEDICAL & SURGICAL HISTORY:  Tubo-ovarian abscess      Ovarian cyst      Colocutaneous fistula  COLOSTOMY      GERD (gastroesophageal reflux disease)      S/P ovarian cystectomy      H/O gastric sleeve  2021      History of arthroscopy  left knee 2022;  left shoulder 2022 ;1 week apart      History of bowel diversion surgery  transverse loop colostomy          MEDICATIONS  (STANDING):    MEDICATIONS  (PRN):      Allergies    No Known Allergies    Intolerances        SOCIAL HISTORY:    FAMILY HISTORY:      Vital Signs Last 24 Hrs  T(C): 36.5 (26 Jan 2023 07:30), Max: 36.5 (26 Jan 2023 06:27)  T(F): 97.7 (26 Jan 2023 06:27), Max: 97.7 (26 Jan 2023 06:27)  HR: 53 (26 Jan 2023 07:30) (53 - 53)  BP: 103/60 (26 Jan 2023 07:30) (103/60 - 103/60)  BP(mean): --  RR: 16 (26 Jan 2023 07:30) (16 - 16)  SpO2: 99% (26 Jan 2023 07:30) (99% - 99%)        PHYSICAL EXAM  General: NAD, resting comfortably in bed  C/V: NSR  Pulm: Nonlabored breathing, no respiratory distress on room air  Abd: soft, ND/NT, no rebound tenderness, no guarding, no flank TTP  : solis draining clear yellow urine.  Extrem: WWP, no edema, SCDs in place      LABS:                RADIOLOGY & ADDITIONAL STUDIES: HPI:  This is a 38 yo lady who had Left large ovarian abscess in 2019 with initial percutaneous drainage and later and [facility] and [facility] She developed colocutaneous fistula and  had symptoms colo-vesicular fistula .  She had a transverse colostomy. She has not had any skin discharge for a year .  She does not have any urinary symptoms post diversion.    Subsequent colonoscopy did not show any luminal lesion.   Repeat CT scan showed interval increase in pelvic mass . Patient symptomatically well.  She has lost about 200lbs ; she had  sleeve gastrectomy.  She was seen in clinic and requested for stoma closure.  She has been seen in clinic and advised that  Surgery may involve resection of  multiple organs ( uterus, bladder and bowel) and may not be possible even.    She is here for colostomy reversal with Gyne /Urology input.     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   4) Sleeve Gastrectomy with 200lbs weight loss    MED: none  ALL: Zosyn: Rash    [facility]  pelvic cyst excsiion June 2019  loop colostomy formation 11/05/19, [facility]  Sleeve gastrectomy 4/21/21, [facility]    (25 Jan 2023 13:39)       ADDENDUM:  Patient referred to Dr. Albert outpatient for history of possible bladder involvement in pelvic abscess/enterocutaneous fistula s/p colonic diversion 11/2020, with symptoms consistent colovesical fistula including feculent urine and pneumaturia which resolved in last 2 years.     CT 5/2022 with stellate likely inflammatory process in sigmoid mesentery, tethering of rectosigmoid bowel. Fluid-filled tubular structure and left adnexal fluid collection, left lower quadrant colostomy w/ large parastomal hernia, and small droplets of gas in urinary bladder.    CT 11/2022 with worsening spiculated soft tissue with tethering of small bowel and sigmoid colon, no colovesical or enterovesical fistula demonstrated. Moderate loculated ascites in LLQ, and left abdominal loop colostomy w/ parastomal hernia    Dr. Albert asked to assist in planned surgery with Dr. Saeed and Dr. Boogie for possible reversal of colostomy, repair of bladder, and possible hysterectomy      PAST MEDICAL & SURGICAL HISTORY:  Tubo-ovarian abscess      Ovarian cyst      Colocutaneous fistula  COLOSTOMY      GERD (gastroesophageal reflux disease)      S/P ovarian cystectomy      H/O gastric sleeve  2021      History of arthroscopy  left knee 2022;  left shoulder 2022 ;1 week apart      History of bowel diversion surgery  transverse loop colostomy          MEDICATIONS  (STANDING):    MEDICATIONS  (PRN):      Allergies    No Known Allergies    Intolerances        SOCIAL HISTORY:    FAMILY HISTORY:      Vital Signs Last 24 Hrs  T(C): 36.5 (26 Jan 2023 07:30), Max: 36.5 (26 Jan 2023 06:27)  T(F): 97.7 (26 Jan 2023 06:27), Max: 97.7 (26 Jan 2023 06:27)  HR: 53 (26 Jan 2023 07:30) (53 - 53)  BP: 103/60 (26 Jan 2023 07:30) (103/60 - 103/60)  BP(mean): --  RR: 16 (26 Jan 2023 07:30) (16 - 16)  SpO2: 99% (26 Jan 2023 07:30) (99% - 99%)        PHYSICAL EXAM  General: NAD  C/V: NSR  Pulm: Nonlabored breathing, no respiratory distress  : solis w/ left ureteral stent connected to ureteral catheter bag, right ureteral stent placed in solis and solis bag  Extrem: WWP, no edema, SCDs in place      LABS:                RADIOLOGY & ADDITIONAL STUDIES:

## 2023-01-26 NOTE — PRE-ANESTHESIA EVALUATION ADULT - NSPROPOSEDPROCEDFT_GEN_ALL_CORE
LAR Open lower anterior resection, splenic flexure mobilization and flex sig, cysto, urethral stents, possible DAVID of bladder adhesions

## 2023-01-26 NOTE — CHART NOTE - NSCHARTNOTEFT_GEN_A_CORE
Procedure: 1/26: ex-lap, lysis of adhesions    Surgeon: Dr. Saeed    S: Patient appears well. Tolerating CLD diet while at bedside. -n/-v. Pain very well controlled on pain medication. Pt has no complaints. Denies CP, SOB, DUPREE, calf tenderness.     O:  T(C): 36.6 (01-26-23 @ 17:13), Max: 36.7 (01-26-23 @ 15:56)  T(F): 97.8 (01-26-23 @ 17:13), Max: 98.1 (01-26-23 @ 15:56)  HR: 51 (01-26-23 @ 17:13) (47 - 53)  BP: 128/72 (01-26-23 @ 17:13) (128/72 - 150/70)  RR: 18 (01-26-23 @ 17:13) (13 - 23)  SpO2: 98% (01-26-23 @ 17:13) (97% - 100%)  Wt(kg): --        Gen: NAD, resting comfortably in bed  C/V: NSR  Pulm: Nonlabored breathing, no respiratory distress  Abd: soft, NT/ND. Midline incision with some mild saturation, but otherwise wnl. Stoma p/p/p.   : solis in place.   Extrem: WWP, no calf edema, SCDs in place    A/P: 37yFemale s/p above procedure  Diet: CLD  Pain/nausea control  DVT ppx: SQH  Dispo plan: regional

## 2023-01-26 NOTE — BRIEF OPERATIVE NOTE - OPERATION/FINDINGS
Cystoscopy revealed 2 areas of possible past fistulas in posterior mid wall and posterior mid floor. 5 fr opened catheters left in each ureter. Left ureteral stent placed in ureteral catheter. Right ureteral stent placed in solis and solis bag. During case, 200 + 400 cc of methylene blue with saline was instilled in bladder, no extravasation noted in abdomen
Rigid cystocsopy , with insertion of bilateral ureteric stents  (see urology operative note.). Midline Laparotomy, extensive adhesiolysis to expose inflammatory mass. Fistula taken down from abdominal abdominal wall.  Intraoperative consult to Gyne service. Hydrosalpinx noted . Benign looking, not for further intervention. Methlene blue instillation in bladder by Urology , no methlene blue seen in operative field. Flexisigmoidoscopy performed , negative leak test.Washout performed and closure in layers. Clips to skin.

## 2023-01-26 NOTE — BRIEF OPERATIVE NOTE - NSICDXBRIEFPROCEDURE_GEN_ALL_CORE_FT
PROCEDURES:  Exploratory laparotomy with lysis of adhesions 26-Jan-2023 13:31:51  Sunny Benjamin  Sigmoidoscopy 26-Jan-2023 13:32:01  Sunny Benjamin  
PROCEDURES:  Cystoscopic insertion of ureteral stent 26-Jan-2023 12:59:17  Ha Tapia  Irrigation, bladder, with instillation if indicated 26-Jan-2023 13:02:10 methylene blue Ha Tapia

## 2023-01-26 NOTE — PRE-ANESTHESIA EVALUATION ADULT - NSANTHPMHFT_GEN_ALL_CORE
METS > 4  denies h/o cardiopulm dz, CVA, DM, SZ d/o, MAURA, DVT/PE, kidney dz, liver dz  GERD well controlled  active smoker METS > 4  denies h/o cardiopulm dz, CVA, DM, SZ d/o, MAURA, DVT/PE, kidney dz, liver dz  GERD well controlled  active smoker    This is a 36yo lady s/p Transverse  loop colostomy due to pelvic inflamatory mass after surgery for left ovarian abscsces. She had colocutanous and colovesicular fistula prior  to diversion. Repeat scan showed interval increase in the pelvic mass despite being well clinically. She is here for an attempt to reverse her colostomy  and exploration of her pelvic mass with the assistance of Gyn service with possible bowel resection, possible ESTEPHANIA/BSO , bladder resection etc.    Urology service has been requested for insertion of ureteral stents.    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   4) Sleeve Gastrectomy with 200lbs weight loss

## 2023-01-26 NOTE — BRIEF OPERATIVE NOTE - NSICDXBRIEFPREOP_GEN_ALL_CORE_FT
PRE-OP DIAGNOSIS:  CVF (colovesical fistula) 26-Jan-2023 13:33:53  Sunny Benjamin  
PRE-OP DIAGNOSIS:  Colovesical fistula 26-Jan-2023 13:03:31  Ha Tapia

## 2023-01-26 NOTE — PRE-ANESTHESIA EVALUATION ADULT - WEIGHT IN LBS
Detail Level: Detailed 166.6 General Sunscreen Counseling: I recommended a broad-spectrum sunscreen with a sun protection factor (SPF) of 30 or higher.  I explained that SPF 30 sunscreens block approximately 97 percent of the sun's harmful rays.  Sunscreens should be applied at least 10 minutes prior to expected sun exposure and then every 2 hours after that as long as sun exposure continues. If swimming or exercising, sunscreen should be reapplied every 40-80 minutes after getting wet or sweating (depending on the water resistance of the sunscreen).  One ounce, or the equivalent of a shot glass full of sunscreen, is adequate to protect the skin not covered by a bathing suit. I also recommended wearing a wide-brimmed hat, a lip balm with a sunscreen and sunglasses. Sun protective clothing can be used in lieu of sunscreen but must be worn the entire time you are exposed to the sun's rays. I also recommended avoiding sun during the peak UVB hours from around 10am to 3pm.

## 2023-01-26 NOTE — CONSULT NOTE ADULT - ASSESSMENT
37F PMH of Left large ovarian abscess (2019) s/p drainage s/p operative drainage and cyst removal (benign pathology), c/b by colocutaneous fistula and with symptoms colo-vesicular fistula now s/p transverse colostomy (11/2020) and resolution of fistula symptoms now s/p lysis of adhesions 1/26/2023, urology place intraop ureteral stents and instilled methylene blue in bladder which revealed no extravasation abdominally.    Recs:  - b/l ureteral stents out per primary team  - solis and TOV per primary team  - no acute  interventions at this time  - f/u with Dr. Albert outpatient   37F PMH of Left large ovarian abscess (2019) s/p drainage s/p operative drainage and cyst removal (benign pathology), c/b by colocutaneous fistula and with symptoms colo-vesicular fistula now s/p transverse colostomy (11/2020) and resolution of fistula symptoms now s/p lysis of adhesions 1/26/2023, urology place intraop ureteral stents and instilled methylene blue in bladder which revealed no extravasation abdominally. Left ureteral stent connected to ureteral catheter bag, right ureteral stent placed in solis and connected to solis bag    Recs:  - b/l ureteral stents out per primary team  - solis and TOV per primary team  - no acute  interventions at this time  - f/u with Dr. Albert outpatient

## 2023-01-26 NOTE — BRIEF OPERATIVE NOTE - NSICDXBRIEFPOSTOP_GEN_ALL_CORE_FT
POST-OP DIAGNOSIS:  Colovesical fistula 26-Jan-2023 13:03:34  Ha Tapia  
POST-OP DIAGNOSIS:  Colovesical fistula 26-Jan-2023 13:34:32  Sunny Benjamin  Hydrosalpinx 26-Jan-2023 13:36:33  Sunny Benjamin

## 2023-01-27 LAB
ANION GAP SERPL CALC-SCNC: 8 MMOL/L — SIGNIFICANT CHANGE UP (ref 5–17)
BUN SERPL-MCNC: 7 MG/DL — SIGNIFICANT CHANGE UP (ref 7–23)
CALCIUM SERPL-MCNC: 8.4 MG/DL — SIGNIFICANT CHANGE UP (ref 8.4–10.5)
CHLORIDE SERPL-SCNC: 101 MMOL/L — SIGNIFICANT CHANGE UP (ref 96–108)
CO2 SERPL-SCNC: 24 MMOL/L — SIGNIFICANT CHANGE UP (ref 22–31)
CREAT SERPL-MCNC: 0.63 MG/DL — SIGNIFICANT CHANGE UP (ref 0.5–1.3)
EGFR: 118 ML/MIN/1.73M2 — SIGNIFICANT CHANGE UP
GLUCOSE SERPL-MCNC: 98 MG/DL — SIGNIFICANT CHANGE UP (ref 70–99)
HCT VFR BLD CALC: 34 % — LOW (ref 34.5–45)
HGB BLD-MCNC: 10.8 G/DL — LOW (ref 11.5–15.5)
MAGNESIUM SERPL-MCNC: 1.6 MG/DL — SIGNIFICANT CHANGE UP (ref 1.6–2.6)
MCHC RBC-ENTMCNC: 25.2 PG — LOW (ref 27–34)
MCHC RBC-ENTMCNC: 31.8 GM/DL — LOW (ref 32–36)
MCV RBC AUTO: 79.4 FL — LOW (ref 80–100)
NRBC # BLD: 0 /100 WBCS — SIGNIFICANT CHANGE UP (ref 0–0)
PHOSPHATE SERPL-MCNC: 3.6 MG/DL — SIGNIFICANT CHANGE UP (ref 2.5–4.5)
PLATELET # BLD AUTO: 224 K/UL — SIGNIFICANT CHANGE UP (ref 150–400)
POTASSIUM SERPL-MCNC: 4.2 MMOL/L — SIGNIFICANT CHANGE UP (ref 3.5–5.3)
POTASSIUM SERPL-SCNC: 4.2 MMOL/L — SIGNIFICANT CHANGE UP (ref 3.5–5.3)
RBC # BLD: 4.28 M/UL — SIGNIFICANT CHANGE UP (ref 3.8–5.2)
RBC # FLD: 13.3 % — SIGNIFICANT CHANGE UP (ref 10.3–14.5)
SODIUM SERPL-SCNC: 133 MMOL/L — LOW (ref 135–145)
WBC # BLD: 9.27 K/UL — SIGNIFICANT CHANGE UP (ref 3.8–10.5)
WBC # FLD AUTO: 9.27 K/UL — SIGNIFICANT CHANGE UP (ref 3.8–10.5)

## 2023-01-27 RX ORDER — ACETAMINOPHEN 500 MG
1000 TABLET ORAL ONCE
Refills: 0 | Status: COMPLETED | OUTPATIENT
Start: 2023-01-27 | End: 2023-01-27

## 2023-01-27 RX ORDER — MAGNESIUM SULFATE 500 MG/ML
2 VIAL (ML) INJECTION EVERY 6 HOURS
Refills: 0 | Status: COMPLETED | OUTPATIENT
Start: 2023-01-27 | End: 2023-01-27

## 2023-01-27 RX ORDER — ACETAMINOPHEN 500 MG
1000 TABLET ORAL EVERY 6 HOURS
Refills: 0 | Status: DISCONTINUED | OUTPATIENT
Start: 2023-01-27 | End: 2023-01-28

## 2023-01-27 RX ORDER — SODIUM CHLORIDE 9 MG/ML
1000 INJECTION, SOLUTION INTRAVENOUS
Refills: 0 | Status: DISCONTINUED | OUTPATIENT
Start: 2023-01-27 | End: 2023-01-28

## 2023-01-27 RX ORDER — LANOLIN ALCOHOL/MO/W.PET/CERES
3 CREAM (GRAM) TOPICAL AT BEDTIME
Refills: 0 | Status: COMPLETED | OUTPATIENT
Start: 2023-01-27 | End: 2023-01-27

## 2023-01-27 RX ORDER — OXYCODONE HYDROCHLORIDE 5 MG/1
5 TABLET ORAL EVERY 6 HOURS
Refills: 0 | Status: DISCONTINUED | OUTPATIENT
Start: 2023-01-27 | End: 2023-01-28

## 2023-01-27 RX ORDER — KETOROLAC TROMETHAMINE 30 MG/ML
15 SYRINGE (ML) INJECTION EVERY 6 HOURS
Refills: 0 | Status: DISCONTINUED | OUTPATIENT
Start: 2023-01-27 | End: 2023-01-28

## 2023-01-27 RX ADMIN — HYDROMORPHONE HYDROCHLORIDE 0.5 MILLIGRAM(S): 2 INJECTION INTRAMUSCULAR; INTRAVENOUS; SUBCUTANEOUS at 05:44

## 2023-01-27 RX ADMIN — Medication 25 GRAM(S): at 09:43

## 2023-01-27 RX ADMIN — Medication 1000 MILLIGRAM(S): at 08:50

## 2023-01-27 RX ADMIN — HYDROMORPHONE HYDROCHLORIDE 0.5 MILLIGRAM(S): 2 INJECTION INTRAMUSCULAR; INTRAVENOUS; SUBCUTANEOUS at 05:59

## 2023-01-27 RX ADMIN — Medication 15 MILLIGRAM(S): at 11:46

## 2023-01-27 RX ADMIN — Medication 400 MILLIGRAM(S): at 03:49

## 2023-01-27 RX ADMIN — Medication 1000 MILLIGRAM(S): at 08:26

## 2023-01-27 RX ADMIN — SODIUM CHLORIDE 50 MILLILITER(S): 9 INJECTION, SOLUTION INTRAVENOUS at 18:21

## 2023-01-27 RX ADMIN — OXYCODONE HYDROCHLORIDE 5 MILLIGRAM(S): 5 TABLET ORAL at 10:02

## 2023-01-27 RX ADMIN — PANTOPRAZOLE SODIUM 40 MILLIGRAM(S): 20 TABLET, DELAYED RELEASE ORAL at 18:36

## 2023-01-27 RX ADMIN — OXYCODONE HYDROCHLORIDE 5 MILLIGRAM(S): 5 TABLET ORAL at 16:59

## 2023-01-27 RX ADMIN — OXYCODONE HYDROCHLORIDE 5 MILLIGRAM(S): 5 TABLET ORAL at 17:02

## 2023-01-27 RX ADMIN — Medication 25 GRAM(S): at 18:36

## 2023-01-27 RX ADMIN — Medication 400 MILLIGRAM(S): at 21:49

## 2023-01-27 RX ADMIN — Medication 1000 MILLIGRAM(S): at 04:04

## 2023-01-27 RX ADMIN — Medication 15 MILLIGRAM(S): at 11:42

## 2023-01-27 RX ADMIN — SODIUM CHLORIDE 125 MILLILITER(S): 9 INJECTION, SOLUTION INTRAVENOUS at 09:44

## 2023-01-27 RX ADMIN — Medication 1000 MILLIGRAM(S): at 22:20

## 2023-01-27 RX ADMIN — Medication 15 MILLIGRAM(S): at 08:50

## 2023-01-27 RX ADMIN — Medication 30 MILLIGRAM(S): at 02:15

## 2023-01-27 RX ADMIN — OXYCODONE HYDROCHLORIDE 5 MILLIGRAM(S): 5 TABLET ORAL at 09:57

## 2023-01-27 RX ADMIN — SODIUM CHLORIDE 125 MILLILITER(S): 9 INJECTION, SOLUTION INTRAVENOUS at 00:46

## 2023-01-27 RX ADMIN — Medication 30 MILLIGRAM(S): at 02:00

## 2023-01-27 RX ADMIN — Medication 15 MILLIGRAM(S): at 08:26

## 2023-01-27 NOTE — PROGRESS NOTE ADULT - SUBJECTIVE AND OBJECTIVE BOX
POD 1 s/p exploratory lap and takedown of cutaneous fistula  9 Uris    Ambulating  ureteral stent removed but solis remains  Tolerating clears  Wants solids  Pain controlled    Vital Signs Last 24 Hrs  T(C): 36.6 (27 Jan 2023 14:00), Max: 36.7 (26 Jan 2023 15:56)  T(F): 97.8 (27 Jan 2023 14:00), Max: 98.1 (26 Jan 2023 15:56)  HR: 44 (27 Jan 2023 14:00) (44 - 51)  BP: 118/71 (27 Jan 2023 14:00) (104/63 - 150/70)  BP(mean): 86 (27 Jan 2023 14:00) (77 - 109)  RR: 17 (27 Jan 2023 14:00) (17 - 18)  SpO2: 98% (27 Jan 2023 14:00) (97% - 100%)    Parameters below as of 27 Jan 2023 14:00  Patient On (Oxygen Delivery Method): room air    abd: incision intact, dressings dry, not distended.  some gas in appliance  ext: without calf tenderness    UO excellent                          10.8   9.27  )-----------( 224      ( 27 Jan 2023 06:21 )             34.0   01-27    133<L>  |  101  |  7   ----------------------------<  98  4.2   |  24  |  0.63    Ca    8.4      27 Jan 2023 06:21  Phos  3.6     01-27  Mg     1.6     01-27

## 2023-01-27 NOTE — PROGRESS NOTE ADULT - SUBJECTIVE AND OBJECTIVE BOX
INTERVAL HPI/OVERNIGHT EVENTS:    STATUS POST:      POST OPERATIVE DAY #:     SUBJECTIVE: Pt seen and examined at bedside this am by surgery team. Tolerating diet, pain well controlled. Denies f/n/v/cp/sob.    MEDICATIONS  (STANDING):  acetaminophen     Tablet .. 1000 milliGRAM(s) Oral every 6 hours  heparin   Injectable 5000 Unit(s) SubCutaneous every 8 hours  influenza   Vaccine 0.5 milliLiter(s) IntraMuscular once  ketorolac   Injectable 15 milliGRAM(s) IV Push every 6 hours  lactated ringers. 1000 milliLiter(s) (125 mL/Hr) IV Continuous <Continuous>  magnesium sulfate  IVPB 2 Gram(s) IV Intermittent every 6 hours  pantoprazole  Injectable 40 milliGRAM(s) IV Push every 24 hours    MEDICATIONS  (PRN):  oxyCODONE    IR 5 milliGRAM(s) Oral every 6 hours PRN breakthrough pain      Vital Signs Last 24 Hrs  T(C): 36.5 (27 Jan 2023 08:39), Max: 36.7 (26 Jan 2023 15:56)  T(F): 97.7 (27 Jan 2023 08:39), Max: 98.1 (26 Jan 2023 15:56)  HR: 47 (27 Jan 2023 08:39) (45 - 53)  BP: 123/76 (27 Jan 2023 08:39) (104/63 - 154/80)  BP(mean): 77 (27 Jan 2023 05:38) (77 - 114)  RR: 17 (27 Jan 2023 08:39) (12 - 23)  SpO2: 97% (27 Jan 2023 08:39) (97% - 100%)    Parameters below as of 27 Jan 2023 08:39  Patient On (Oxygen Delivery Method): room air        PHYSICAL EXAM:      Constitutional: A&Ox3    Breasts:    Respiratory: non labored breathing, no respiratory distress    Cardiovascular: NSR, RRR    Gastrointestinal:                 Incision:    Genitourinary:    Extremities: (-) edema                  I&O's Detail    26 Jan 2023 07:01  -  27 Jan 2023 07:00  --------------------------------------------------------  IN:    Lactated Ringers: 4750 mL    Oral Fluid: 120 mL  Total IN: 4870 mL    OUT:    Blood Loss (mL): 75 mL    Colostomy (mL): 0 mL    Indwelling Catheter - Urethral (mL): 930 mL    Ureteral Catheter (mL): 460 mL  Total OUT: 1465 mL    Total NET: 3405 mL      27 Jan 2023 07:01  -  27 Jan 2023 11:53  --------------------------------------------------------  IN:    IV PiggyBack: 50 mL    Lactated Ringers: 625 mL  Total IN: 675 mL    OUT:  Total OUT: 0 mL    Total NET: 675 mL          LABS:                        10.8   9.27  )-----------( 224      ( 27 Jan 2023 06:21 )             34.0     01-27    133<L>  |  101  |  7   ----------------------------<  98  4.2   |  24  |  0.63    Ca    8.4      27 Jan 2023 06:21  Phos  3.6     01-27  Mg     1.6     01-27            RADIOLOGY & ADDITIONAL STUDIES: INTERVAL HPI/OVERNIGHT EVENTS: rajan cld, stoma PPP, ostomy w/ gas/stool, soils w/ adequate OP     STATUS POST: 1/26: ex-lap, lysis of adhesions    POST OPERATIVE DAY #: 1    SUBJECTIVE: Pt seen and examined at bedside this am by surgery team. Reports feeling hungry, no acute complaints. Tolerating diet, pain well controlled. Denies f/n/v/cp/sob.    MEDICATIONS  (STANDING):  acetaminophen     Tablet .. 1000 milliGRAM(s) Oral every 6 hours  heparin   Injectable 5000 Unit(s) SubCutaneous every 8 hours  influenza   Vaccine 0.5 milliLiter(s) IntraMuscular once  ketorolac   Injectable 15 milliGRAM(s) IV Push every 6 hours  lactated ringers. 1000 milliLiter(s) (125 mL/Hr) IV Continuous <Continuous>  magnesium sulfate  IVPB 2 Gram(s) IV Intermittent every 6 hours  pantoprazole  Injectable 40 milliGRAM(s) IV Push every 24 hours    MEDICATIONS  (PRN):  oxyCODONE    IR 5 milliGRAM(s) Oral every 6 hours PRN breakthrough pain    Vital Signs Last 24 Hrs  T(C): 36.5 (27 Jan 2023 08:39), Max: 36.7 (26 Jan 2023 15:56)  T(F): 97.7 (27 Jan 2023 08:39), Max: 98.1 (26 Jan 2023 15:56)  HR: 47 (27 Jan 2023 08:39) (45 - 53)  BP: 123/76 (27 Jan 2023 08:39) (104/63 - 154/80)  BP(mean): 77 (27 Jan 2023 05:38) (77 - 114)  RR: 17 (27 Jan 2023 08:39) (12 - 23)  SpO2: 97% (27 Jan 2023 08:39) (97% - 100%)    Parameters below as of 27 Jan 2023 08:39  Patient On (Oxygen Delivery Method): room air    PHYSICAL EXAM:    Constitutional: A&Ox3, NAD    Respiratory: non labored breathing, no respiratory distress    Cardiovascular: NSR, RRR    Gastrointestinal: abdomen soft, nd, appropriately ttp, ostomy w/ stoma pink and viable, gas and soft stool seen in bag.               Genitourinary: Solis in place draining light greenish output (from intraop methylene blue). ureteral stent removed at bedside     Extremities: wwp, no calf tenderness or edema. SCDs in place       I&O's Detail    26 Jan 2023 07:01  -  27 Jan 2023 07:00  --------------------------------------------------------  IN:    Lactated Ringers: 4750 mL    Oral Fluid: 120 mL  Total IN: 4870 mL    OUT:    Blood Loss (mL): 75 mL    Colostomy (mL): 0 mL    Indwelling Catheter - Urethral (mL): 930 mL    Ureteral Catheter (mL): 460 mL  Total OUT: 1465 mL    Total NET: 3405 mL      27 Jan 2023 07:01  -  27 Jan 2023 11:53  --------------------------------------------------------  IN:    IV PiggyBack: 50 mL    Lactated Ringers: 625 mL  Total IN: 675 mL    OUT:  Total OUT: 0 mL    Total NET: 675 mL          LABS:                        10.8   9.27  )-----------( 224      ( 27 Jan 2023 06:21 )             34.0     01-27    133<L>  |  101  |  7   ----------------------------<  98  4.2   |  24  |  0.63    Ca    8.4      27 Jan 2023 06:21  Phos  3.6     01-27  Mg     1.6     01-27            RADIOLOGY & ADDITIONAL STUDIES:

## 2023-01-28 ENCOUNTER — TRANSCRIPTION ENCOUNTER (OUTPATIENT)
Age: 38
End: 2023-01-28

## 2023-01-28 VITALS
TEMPERATURE: 98 F | RESPIRATION RATE: 17 BRPM | HEART RATE: 53 BPM | DIASTOLIC BLOOD PRESSURE: 79 MMHG | SYSTOLIC BLOOD PRESSURE: 120 MMHG | OXYGEN SATURATION: 100 %

## 2023-01-28 LAB
ANION GAP SERPL CALC-SCNC: 9 MMOL/L — SIGNIFICANT CHANGE UP (ref 5–17)
BUN SERPL-MCNC: 7 MG/DL — SIGNIFICANT CHANGE UP (ref 7–23)
CALCIUM SERPL-MCNC: 8.1 MG/DL — LOW (ref 8.4–10.5)
CHLORIDE SERPL-SCNC: 101 MMOL/L — SIGNIFICANT CHANGE UP (ref 96–108)
CO2 SERPL-SCNC: 25 MMOL/L — SIGNIFICANT CHANGE UP (ref 22–31)
CREAT SERPL-MCNC: 0.62 MG/DL — SIGNIFICANT CHANGE UP (ref 0.5–1.3)
EGFR: 118 ML/MIN/1.73M2 — SIGNIFICANT CHANGE UP
GLUCOSE SERPL-MCNC: 84 MG/DL — SIGNIFICANT CHANGE UP (ref 70–99)
HCT VFR BLD CALC: 32.6 % — LOW (ref 34.5–45)
HGB BLD-MCNC: 10.1 G/DL — LOW (ref 11.5–15.5)
MAGNESIUM SERPL-MCNC: 2.1 MG/DL — SIGNIFICANT CHANGE UP (ref 1.6–2.6)
MAGNESIUM SERPL-MCNC: 7.2 MG/DL — CRITICAL HIGH (ref 1.6–2.6)
MCHC RBC-ENTMCNC: 25.3 PG — LOW (ref 27–34)
MCHC RBC-ENTMCNC: 31 GM/DL — LOW (ref 32–36)
MCV RBC AUTO: 81.5 FL — SIGNIFICANT CHANGE UP (ref 80–100)
NRBC # BLD: 0 /100 WBCS — SIGNIFICANT CHANGE UP (ref 0–0)
PHOSPHATE SERPL-MCNC: 3 MG/DL — SIGNIFICANT CHANGE UP (ref 2.5–4.5)
PLATELET # BLD AUTO: 175 K/UL — SIGNIFICANT CHANGE UP (ref 150–400)
POTASSIUM SERPL-MCNC: 4.7 MMOL/L — SIGNIFICANT CHANGE UP (ref 3.5–5.3)
POTASSIUM SERPL-SCNC: 4.7 MMOL/L — SIGNIFICANT CHANGE UP (ref 3.5–5.3)
RBC # BLD: 4 M/UL — SIGNIFICANT CHANGE UP (ref 3.8–5.2)
RBC # FLD: 13.3 % — SIGNIFICANT CHANGE UP (ref 10.3–14.5)
SODIUM SERPL-SCNC: 135 MMOL/L — SIGNIFICANT CHANGE UP (ref 135–145)
WBC # BLD: 6.75 K/UL — SIGNIFICANT CHANGE UP (ref 3.8–10.5)
WBC # FLD AUTO: 6.75 K/UL — SIGNIFICANT CHANGE UP (ref 3.8–10.5)

## 2023-01-28 PROCEDURE — 86850 RBC ANTIBODY SCREEN: CPT

## 2023-01-28 PROCEDURE — 88305 TISSUE EXAM BY PATHOLOGIST: CPT

## 2023-01-28 PROCEDURE — 88304 TISSUE EXAM BY PATHOLOGIST: CPT

## 2023-01-28 PROCEDURE — 85027 COMPLETE CBC AUTOMATED: CPT

## 2023-01-28 PROCEDURE — 86901 BLOOD TYPING SEROLOGIC RH(D): CPT

## 2023-01-28 PROCEDURE — 36415 COLL VENOUS BLD VENIPUNCTURE: CPT

## 2023-01-28 PROCEDURE — 83735 ASSAY OF MAGNESIUM: CPT

## 2023-01-28 PROCEDURE — 80048 BASIC METABOLIC PNL TOTAL CA: CPT

## 2023-01-28 PROCEDURE — 86900 BLOOD TYPING SEROLOGIC ABO: CPT

## 2023-01-28 PROCEDURE — 84100 ASSAY OF PHOSPHORUS: CPT

## 2023-01-28 PROCEDURE — 82962 GLUCOSE BLOOD TEST: CPT

## 2023-01-28 PROCEDURE — 88300 SURGICAL PATH GROSS: CPT

## 2023-01-28 RX ORDER — OXYCODONE HYDROCHLORIDE 5 MG/1
1 TABLET ORAL
Qty: 10 | Refills: 0
Start: 2023-01-28

## 2023-01-28 RX ADMIN — Medication 15 MILLIGRAM(S): at 05:54

## 2023-01-28 RX ADMIN — Medication 15 MILLIGRAM(S): at 00:51

## 2023-01-28 RX ADMIN — OXYCODONE HYDROCHLORIDE 5 MILLIGRAM(S): 5 TABLET ORAL at 10:49

## 2023-01-28 RX ADMIN — Medication 15 MILLIGRAM(S): at 05:47

## 2023-01-28 RX ADMIN — Medication 15 MILLIGRAM(S): at 00:35

## 2023-01-28 RX ADMIN — OXYCODONE HYDROCHLORIDE 5 MILLIGRAM(S): 5 TABLET ORAL at 10:17

## 2023-01-28 NOTE — DISCHARGE NOTE PROVIDER - HOSPITAL COURSE
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 Her postoperative course was unremarkable with advancement of diet, passing trial of void, and pain control. On day of discharge patient was stable to be d/c'd home.    GYN was consult intraoperatively and noted a benign looking hydrosalpinx, no follow up required.   Urology consulted for operative stent placement - stents have been removed and patient is recommended to follow up with Dr. Albert in clinic.

## 2023-01-28 NOTE — DISCHARGE NOTE PROVIDER - NSDCFUADDINST_GEN_ALL_CORE_FT
For pain, you may take 400mg of Ibuprofen every 6 hours as needed and 650mg of Tylenol every 6 hours as needed. You may alternate both medications, i.e. Ibuprofen at 12 PM, Tylenol at 3 PM, Ibuprofen 6 PM, Tylenol 9 PM, as needed.  For pain that is not resolved with over the counter medication, you may take 1 oxycodone 5mg tablet every 8 hours as needed.    General Discharge Instructions:  Please resume all regular home medications unless specifically advised not to take a particular medication. Please take any new medications as prescribed.  Please get plenty of rest, continue to ambulate several times per day, and drink adequate amounts of fluids. Avoid lifting weights greater than 5-10 lbs until you follow-up with your surgeon, who will instruct you further regarding activity restrictions.  Avoid driving or operating heavy machinery while taking pain medications.  Please follow-up with your surgeon and Primary Care Provider (PCP) as advised.    Please keep dressings in place - Dr. Saeed will remove in office.     Warning Signs:  Please call your doctor or nurse practitioner if you experience the following:  *You experience new chest pain, pressure, squeezing or tightness.  *New or worsening cough, shortness of breath, or wheeze.  *If you are vomiting and cannot keep down fluids or your medications.  *You are getting dehydrated due to continued vomiting, diarrhea, or other reasons. Signs of dehydration include dry mouth, rapid heartbeat, or feeling dizzy or faint when standing.  *You see blood or dark/black material when you vomit or have a bowel movement.  *You experience burning when you urinate, have blood in your urine, or experience a discharge.  *Your pain is not improving within 8-12 hours or is not gone within 24 hours. Call or return immediately if your pain is getting worse, changes location, or moves to your chest or back.  *You have shaking chills, or fever greater than 101.5 degrees Fahrenheit or 38 degrees Celsius.  *Any change in your symptoms, or any new symptoms that concern you.

## 2023-01-28 NOTE — DISCHARGE NOTE PROVIDER - CARE PROVIDERS DIRECT ADDRESSES
[email or web address]

## 2023-01-28 NOTE — DISCHARGE NOTE PROVIDER - NSDCMRMEDTOKEN_GEN_ALL_CORE_FT
oxyCODONE 5 mg oral tablet: 1 tab(s) orally every 8 hours, As Needed  -for severe pain MDD:3  Pepcid 40 mg oral tablet: 1 tab(s) orally once a day (at bedtime)

## 2023-01-28 NOTE — DISCHARGE NOTE NURSING/CASE MANAGEMENT/SOCIAL WORK - PATIENT PORTAL LINK FT
You can access the FollowMyHealth Patient Portal offered by [facility] by registering at the following website: [email or web address]. By joining [employer]’s FollowMyHealth portal, you will also be able to view your health information using other applications (apps) compatible with our system.

## 2023-01-28 NOTE — PROGRESS NOTE ADULT - SUBJECTIVE AND OBJECTIVE BOX
STATUS POST: ExLap DAVID    POST OPERATIVE DAY #: 2    SUBJECTIVE: Pt seen and examined at bedside this am by surgery team. Patient is lying comfortably in bed with no complaints. Tolerating diet, pain well controlled with current regimen. Patient denies fever, nausea, vomiting, chest pain, and shortness of breath. Patient is passing gas and having bowel movements. Walters in place    MEDICATIONS  (STANDING):  acetaminophen     Tablet .. 1000 milliGRAM(s) Oral every 6 hours  heparin   Injectable 5000 Unit(s) SubCutaneous every 8 hours  influenza   Vaccine 0.5 milliLiter(s) IntraMuscular once  ketorolac   Injectable 15 milliGRAM(s) IV Push every 6 hours  lactated ringers. 1000 milliLiter(s) (50 mL/Hr) IV Continuous <Continuous>  pantoprazole  Injectable 40 milliGRAM(s) IV Push every 24 hours    MEDICATIONS  (PRN):  oxyCODONE    IR 5 milliGRAM(s) Oral every 6 hours PRN breakthrough pain      Vital Signs Last 24 Hrs  T(C): 36.7 (28 Jan 2023 13:16), Max: 36.7 (28 Jan 2023 13:16)  T(F): 98 (28 Jan 2023 13:16), Max: 98 (28 Jan 2023 13:16)  HR: 53 (28 Jan 2023 13:16) (47 - 53)  BP: 120/79 (28 Jan 2023 13:16) (97/68 - 131/76)  BP(mean): 72 (28 Jan 2023 00:14) (71 - 88)  RR: 17 (28 Jan 2023 13:16) (17 - 18)  SpO2: 100% (28 Jan 2023 13:16) (92% - 100%)    Parameters below as of 28 Jan 2023 13:16  Patient On (Oxygen Delivery Method): room air        Physical Exam  General: Patient is doing well and lying in bed comfortably  Constitutional: alert and oriented   Pulm: Nonlabored breathing, no respiratory distress  CV: Regular rate and rhythm, normal sinus rhythm  Abd:  soft, nontender, nondistended. No rebound, no guarding.             Incisions: clean, dry, intact and healing well, no erythema/induration/edema  Extremities: warm, well perfused, no edema  Drains: Walters in place    I&O's Detail    27 Jan 2023 07:01  -  28 Jan 2023 07:00  --------------------------------------------------------  IN:    IV PiggyBack: 100 mL    Lactated Ringers: 1375 mL    Lactated Ringers: 650 mL  Total IN: 2125 mL    OUT:    Colostomy (mL): 170 mL    Indwelling Catheter - Urethral (mL): 1095 mL  Total OUT: 1265 mL    Total NET: 860 mL      28 Jan 2023 07:01  -  28 Jan 2023 14:40  --------------------------------------------------------  IN:    Lactated Ringers: 300 mL    Oral Fluid: 540 mL  Total IN: 840 mL    OUT:    Voided (mL): 680 mL  Total OUT: 680 mL    Total NET: 160 mL        LABS:                        10.1   6.75  )-----------( 175      ( 28 Jan 2023 07:45 )             32.6     01-28    135  |  101  |  7   ----------------------------<  84  4.7   |  25  |  0.62    Ca    8.1<L>      28 Jan 2023 07:45  Phos  3.0     01-28  Mg     2.1     01-28

## 2023-01-28 NOTE — DISCHARGE NOTE PROVIDER - NSDCFUADDAPPT_GEN_ALL_CORE_FT
Please follow up with Dr. Saeed; you may call the office at  to schedule your appointment.     Please follow up with Dr. Albert; you may call the office at  to schedule your appointment.

## 2023-01-28 NOTE — DISCHARGE NOTE PROVIDER - CARE PROVIDER_API CALL
Jerry Saeed)  ColonRectal Surgery  [address], [place] [address]  Phone: [phone]  Fax: [phone]  Follow Up Time:     Filippo Albert)  Urology  [address] [place] [address], [place] [address]  Phone: [phone]  Fax: [phone]  Follow Up Time:

## 2023-01-28 NOTE — DISCHARGE NOTE PROVIDER - NSDCCPCAREPLAN_GEN_ALL_CORE_FT
PRINCIPAL DISCHARGE DIAGNOSIS  Diagnosis: S/P exploratory laparotomy  Assessment and Plan of Treatment:

## 2023-01-28 NOTE — DISCHARGE NOTE PROVIDER - INSTRUCTIONS
Please continue a LOW-FIBER DIET. Listed below are some foods you may eat and those you should avoid.   --Allowed foods:  White bread without nuts and seeds  White rice, plain white pasta, and crackers  Refined hot cereals, such as Cream of Wheat, or cold cereals with less than 1 gram of fiber per serving  Most canned or well-cooked vegetables and fruits without skins or seeds  Fruit and vegetable juice with little or no pulp, fruit-flavored drinks, and flavored hernandez  Tender meat, poultry, fish, eggs and tofu  Milk and foods made from milk — such as yogurt, pudding, ice cream, cheeses and sour cream — if tolerated  Butter, margarine, oils and salad dressings without seeds  --Foods to avoid:  Whole-wheat or whole-grain breads, cereals, pasta, legumes, seeds, nuts  Brown or wild rice and other whole grains  Dried fruits and prune juice  Raw or undercooked fruits and vegetables

## 2023-01-28 NOTE — DISCHARGE NOTE NURSING/CASE MANAGEMENT/SOCIAL WORK - NSDCPEFALRISK_GEN_ALL_CORE
For information on Fall & Injury Prevention, visit: [email or web address] OR  [email or web address] OR  https://www.cdc.gov/steadi/patient.html

## 2023-01-28 NOTE — PROGRESS NOTE ADULT - ASSESSMENT
A/P  Doing fine  Needs ambulation  solis out tomorrow  Solid food is fine.  Will be advanced  Stomal care as per patient  WOCN assistance as needed.  Possible d/c Saturday or Sunday.
37F PMH GERD and Left large ovarian abscess (2019) s/p drainage and cyst removal (benign pathology), colocutaneous fistula and with symptoms colo-vesicular fistula now s/p transverse colostomy (11/2020) and resolution of fistula symptoms, now s/p lysis of adhesions 1/26/2023     LFD/IVF  Pain/nausea control  SQH/SCD/IS  PPI  dc solis    
37F PMH GERD and Left large ovarian abscess (2019) s/p drainage and cyst removal (benign pathology), colocutaneous fistula and with symptoms colo-vesicular fistula now s/p transverse colostomy (11/2020) and resolution of fistula symptoms, now s/p ex lap, lysis of adhesions 1/26/2023.    LFD/IVF  Pain/nausea control prn  SQH/SCDs, pt is refusing HSQ despite risks and benefits discussed  OOBA/IS  PPI  Walters, ureteral stent removed today  F/u GYN recs

## 2023-01-30 PROBLEM — K21.9 GASTRO-ESOPHAGEAL REFLUX DISEASE WITHOUT ESOPHAGITIS: Chronic | Status: ACTIVE | Noted: 2023-01-25

## 2023-01-31 ENCOUNTER — APPOINTMENT (OUTPATIENT)
Dept: COLORECTAL SURGERY | Facility: HOSPITAL | Age: 38
End: 2023-01-31

## 2023-01-31 NOTE — DISCHARGE NOTE PROVIDER - NSDCDCMDCOMP_GEN_ALL_CORE
Plan:  Cardiac medications reviewed including indications and pertinent side effects. Medication list updated at this visit. Check blood pressure and heart rate at home a few times per week- keep a log with dates and times and bring to office visit   Regular exercise and following a healthy diet encouraged   Recommend the Mediterranean diet for a heart healthy option. For weight loss recommend counting calories with a program like Weight Watchers.    Follow up with me in 1 year   No refills needed This document is complete and the patient is ready for discharge.

## 2023-02-02 DIAGNOSIS — N70.11 CHRONIC SALPINGITIS: ICD-10-CM

## 2023-02-02 DIAGNOSIS — N32.1 VESICOINTESTINAL FISTULA: ICD-10-CM

## 2023-02-02 DIAGNOSIS — Z43.3 ENCOUNTER FOR ATTENTION TO COLOSTOMY: ICD-10-CM

## 2023-02-02 DIAGNOSIS — N83.209 UNSPECIFIED OVARIAN CYST, UNSPECIFIED SIDE: ICD-10-CM

## 2023-02-02 DIAGNOSIS — F17.200 NICOTINE DEPENDENCE, UNSPECIFIED, UNCOMPLICATED: ICD-10-CM

## 2023-02-02 DIAGNOSIS — K21.9 GASTRO-ESOPHAGEAL REFLUX DISEASE WITHOUT ESOPHAGITIS: ICD-10-CM

## 2023-02-02 DIAGNOSIS — N94.89 OTHER SPECIFIED CONDITIONS ASSOCIATED WITH FEMALE GENITAL ORGANS AND MENSTRUAL CYCLE: ICD-10-CM

## 2023-02-02 DIAGNOSIS — Z88.1 ALLERGY STATUS TO OTHER ANTIBIOTIC AGENTS STATUS: ICD-10-CM

## 2023-02-02 DIAGNOSIS — Z93.3 COLOSTOMY STATUS: ICD-10-CM

## 2023-02-02 DIAGNOSIS — Z18.89 OTHER SPECIFIED RETAINED FOREIGN BODY FRAGMENTS: ICD-10-CM

## 2023-02-02 DIAGNOSIS — Z90.3 ACQUIRED ABSENCE OF STOMACH [PART OF]: ICD-10-CM

## 2023-02-02 DIAGNOSIS — E83.42 HYPOMAGNESEMIA: ICD-10-CM

## 2023-02-03 LAB — SURGICAL PATHOLOGY STUDY: SIGNIFICANT CHANGE UP

## 2023-02-06 ENCOUNTER — APPOINTMENT (OUTPATIENT)
Dept: COLORECTAL SURGERY | Facility: CLINIC | Age: 38
End: 2023-02-06
Payer: MEDICAID

## 2023-02-06 VITALS
BODY MASS INDEX: 29.67 KG/M2 | HEIGHT: 63 IN | TEMPERATURE: 98.2 F | WEIGHT: 167.44 LBS | OXYGEN SATURATION: 95 % | RESPIRATION RATE: 18 BRPM | HEART RATE: 74 BPM

## 2023-02-06 VITALS — DIASTOLIC BLOOD PRESSURE: 62 MMHG | SYSTOLIC BLOOD PRESSURE: 111 MMHG

## 2023-02-06 PROCEDURE — 99024 POSTOP FOLLOW-UP VISIT: CPT

## 2023-02-06 RX ORDER — METRONIDAZOLE 500 MG/1
500 TABLET ORAL
Qty: 6 | Refills: 0 | Status: DISCONTINUED | COMMUNITY
Start: 2023-01-17 | End: 2023-02-06

## 2023-02-06 RX ORDER — NEOMYCIN SULFATE 500 MG/1
500 TABLET ORAL
Qty: 6 | Refills: 0 | Status: DISCONTINUED | COMMUNITY
Start: 2023-01-20 | End: 2023-02-06

## 2023-02-06 RX ORDER — POLYETHYLENE GLYCOL-3350, SODIUM CHLORIDE, POTASSIUM CHLORIDE AND SODIUM BICARBONATE 420; 11.2; 5.72; 1.48 G/438.4G; G/438.4G; G/438.4G; G/438.4G
420 POWDER, FOR SOLUTION ORAL
Qty: 1 | Refills: 0 | Status: DISCONTINUED | COMMUNITY
Start: 2022-04-22 | End: 2023-02-06

## 2023-02-06 NOTE — REVIEW OF SYSTEMS
[Negative] : Heme/Lymph [Fever] : no fever [Chills] : no chills [Feeling Poorly] : not feeling poorly [Feeling Tired] : not feeling tired [Recent Weight Gain (___ Lbs)] : no recent weight gain [Recent Weight Loss (___ Lbs)] : no recent weight loss [Eye Pain] : no eye pain [Red Eyes] : eyes not red [Eyesight Problems] : no eyesight problems [Discharge From Eyes] : no purulent discharge from the eyes [Dry Eyes] : no dryness of the eyes [Eyes Itch] : no itching of the eyes [Earache] : no earache [Loss Of Hearing] : no hearing loss [Nosebleeds] : no nosebleeds [Nasal Discharge] : no nasal discharge [Sore Throat] : no sore throat [Hoarseness] : no hoarseness [Heart Rate Is Slow] : the heart rate was not slow [Heart Rate Is Fast] : the heart rate was not fast [Chest Pain] : no chest pain [Palpitations] : no palpitations [Leg Claudication] : no intermittent leg claudication [Lower Ext Edema] : no lower extremity edema [Shortness Of Breath] : no shortness of breath [Wheezing] : no wheezing [Cough] : no cough [SOB on Exertion] : no shortness of breath during exertion [Orthopnea] : no orthopnea [PND] : no PND [Abdominal Pain] : no abdominal pain [Vomiting] : no vomiting [Constipation] : no constipation [Diarrhea] : no diarrhea [Heartburn] : no heartburn [Melena] : no melena [Dysuria] : no dysuria [Incontinence] : no incontinence [Pelvic Pain] : no pelvic pain [Dysmenorrhea] : no dysmenorrhea [Vaginal Discharge] : no vaginal discharge [Abn Vaginal Bleeding] : no unexplained vaginal bleeding [Arthralgias] : no arthralgias [Joint Swelling] : no joint swelling [Joint Stiffness] : no joint stiffness [Limb Swelling] : no limb swelling [Limb Pain] : no limb pain [Skin Lesions] : no skin lesions [Skin Wound] : no skin wound [Itching] : no itching [Change In A Mole] : no change in a mole [Breast Pain] : no breast pain [Breast Lump] : no breast lump [Confused] : no confusion [Convulsions] : no convulsions [Dizziness] : no dizziness [Fainting] : no fainting [Limb Weakness] : no limb weakness [Difficulty Walking] : no difficulty walking [Suicidal] : not suicidal [Sleep Disturbances] : no sleep disturbances [Anxiety] : no anxiety [Depression] : no depression

## 2023-02-06 NOTE — PHYSICAL EXAM
[Exam Deferred] : exam was deferred [Normal Breath Sounds] : Normal breath sounds [Normal Heart Sounds] : normal heart sounds [Normal Rate and Rhythm] : normal rate and rhythm [2+] : left 2+ [No Rash or Lesion] : No rash or lesion [Alert] : alert [Oriented to Person] : oriented to person [Oriented to Place] : oriented to place [Oriented to Time] : oriented to time [Calm] : calm [Abdomen Tenderness] : ~T No ~M abdominal tenderness [Wheezing] : no wheezing was heard [Skin Induration] : no induration [de-identified] : Midline incision with staples and 3 Penrose drains, no erythema or induration. Colostomy in place  [de-identified] : NAD [de-identified] : Appropriate gait

## 2023-02-06 NOTE — HISTORY OF PRESENT ILLNESS
[FreeTextEntry1] : 37F PMH GERD and Left large ovarian abscess (2019) s/p drainage and cyst removal (benign pathology), colocutaneous fistula and with symptoms of colo-vesicular fistula now s/p transverse colostomy (11/2019) and resolution of fistula symptoms, now s/p ex lap, lysis of adhesions 1/26/2023.\par Denies fever or chills. Pain is very minimal and barely taking tylenol. Feels better than before surgery. one of the Penrose drains fell off two days ago. Colostomy is functioning well. \par \par

## 2023-02-06 NOTE — ASSESSMENT
[FreeTextEntry1] : \par Post up visit. s/p ex. Lap lysis of adhesions. \par Doing well. \par Surgical incision intact with no erythema or induration.\par Penrose drains removed.\par Staples removed partially. Steri stripes applied.\par DSD daily and as needed.\par f/u in a week to remove remainder of the staples.\par Call for any problems. \par \par

## 2023-02-13 ENCOUNTER — APPOINTMENT (OUTPATIENT)
Dept: COLORECTAL SURGERY | Facility: CLINIC | Age: 38
End: 2023-02-13
Payer: MEDICAID

## 2023-02-13 ENCOUNTER — APPOINTMENT (OUTPATIENT)
Dept: COLORECTAL SURGERY | Facility: CLINIC | Age: 38
End: 2023-02-13

## 2023-02-13 VITALS
OXYGEN SATURATION: 96 % | WEIGHT: 167 LBS | SYSTOLIC BLOOD PRESSURE: 101 MMHG | BODY MASS INDEX: 29.59 KG/M2 | HEIGHT: 63 IN | DIASTOLIC BLOOD PRESSURE: 62 MMHG | TEMPERATURE: 98.1 F | HEART RATE: 88 BPM

## 2023-02-13 PROCEDURE — 99024 POSTOP FOLLOW-UP VISIT: CPT

## 2023-02-13 NOTE — HISTORY OF PRESENT ILLNESS
[FreeTextEntry1] : 37 year old female s/p ex lap, lysis of adhesions.. Here for staple removal. \par Doing well, no pain, no fevers,  stoma is functioning daily. Is voiding well

## 2023-02-13 NOTE — PHYSICAL EXAM
[FreeTextEntry1] : PE:\par \par AOA, looks well\par Abdomen: soft, flat, on tender, midline incision is well healed with some staples intact. There is  no erythema or tenderness. There is a fungal type odor noted, some moisture  within the umbilicus.  no  actual rash or discharge noted.   \par ext: no calf tenderness.

## 2023-02-13 NOTE — ASSESSMENT
[FreeTextEntry1] : staples removed, steri strips placed. \par nystatin powder for poss  fungal odor. \par to follow up in 1-2 months  with virgen.  To call for any problems /concerns

## 2023-02-23 ENCOUNTER — APPOINTMENT (OUTPATIENT)
Dept: UROLOGY | Facility: CLINIC | Age: 38
End: 2023-02-23
Payer: MEDICAID

## 2023-02-23 VITALS
OXYGEN SATURATION: 97 % | WEIGHT: 165 LBS | DIASTOLIC BLOOD PRESSURE: 67 MMHG | HEIGHT: 63 IN | TEMPERATURE: 98.2 F | SYSTOLIC BLOOD PRESSURE: 116 MMHG | BODY MASS INDEX: 29.23 KG/M2 | HEART RATE: 78 BPM

## 2023-02-23 DIAGNOSIS — N32.1 VESICOINTESTINAL FISTULA: ICD-10-CM

## 2023-02-23 LAB
BILIRUB UR QL STRIP: NORMAL
CLARITY UR: CLEAR
COLLECTION METHOD: NORMAL
GLUCOSE UR-MCNC: NORMAL
HCG UR QL: 0.2 EU/DL
HGB UR QL STRIP.AUTO: NORMAL
KETONES UR-MCNC: NORMAL
LEUKOCYTE ESTERASE UR QL STRIP: NORMAL
NITRITE UR QL STRIP: NORMAL
PH UR STRIP: 7.5
PROT UR STRIP-MCNC: NORMAL
SP GR UR STRIP: 1.02

## 2023-02-23 PROCEDURE — 99213 OFFICE O/P EST LOW 20 MIN: CPT

## 2023-02-23 NOTE — PHYSICAL EXAM
[General Appearance - Well Developed] : well developed [Normal Appearance] : normal appearance [Abdomen Soft] : soft [Abdomen Tenderness] : non-tender [Skin Color & Pigmentation] : normal skin color and pigmentation [Heart Rate And Rhythm] : Heart rate and rhythm were normal [] : no respiratory distress [Oriented To Time, Place, And Person] : oriented to person, place, and time [Normal Station and Gait] : the gait and station were normal for the patient's age [No Focal Deficits] : no focal deficits [FreeTextEntry1] : Incision C/D/I

## 2023-02-23 NOTE — ASSESSMENT
[FreeTextEntry1] : 36 yo female with hx of pelvic infection resulting in colocutaneous and colovesical fistula.  Colovesical fistula has healed, but she still has a bowel diversion.  I will be available in the OR when she is scheduled to go back for ostomy reversal.

## 2023-02-23 NOTE — HISTORY OF PRESENT ILLNESS
[FreeTextEntry1] : 10/25/22:\par 37 yo female referred for possible bladder involvement in pelvic abscess / enterocutaneous fistula.  She has a complicated surgical history which started in 2020 with a tuboovarian abscess.  After several surgical interventions she developed an enterocutaneous fistula.  She underwent colonic diversion in November of 2020.  She subsequently underwent gastric sleeve bariatric surgery to be optimized for repair of the fistula and colonic reanastomosis.  She has lost about 150 lbs since her bariatric surgery.  Prior to colonic diversion she notes urinary symptoms that were consistent with colovesical fistula including feculent urine, and pneumaturia.  This has all resolved over the last 2 years.  However, a CT scan from May 2022 does show small pockets of air in the bladder which would suggest the persistence of a small fistula.  The CT also noted the presence of a large parastomal hernia.  The kidneys appeared normal on this scan with no hydronephrosis suggesting the ureters are not involved in this pelvic process.  \par \par She is planning to have joint surgery with colorectal surgery and Gyn for repair of her fistula, reversal of her colostomy, and hysterectomy.  She was referred here for likely bladder involvement.  \par \par ************\par 2/23/23:\par Patient returns for first post op check.  She is s/p ex-lap and DAVID.  Her colovesical fistulas appeared to be healed.  There was no bladder leak noted on intraop leak test.  Since DAVID the patient has noticed a reduction in urinary frequency and she is able to go longer amounts of time without having to void.  She is due for follow up with colorectal surgery in 6-8 weeks.  There is a plan to go back to the OR to attempt ostomy reversal and resection of an ovarian cyst.

## 2023-02-24 LAB
APPEARANCE: ABNORMAL
BACTERIA: NEGATIVE
BILIRUBIN URINE: NEGATIVE
BLOOD URINE: ABNORMAL
COLOR: YELLOW
GLUCOSE QUALITATIVE U: NEGATIVE
HYALINE CASTS: 4 /LPF
KETONES URINE: NEGATIVE
LEUKOCYTE ESTERASE URINE: ABNORMAL
MICROSCOPIC-UA: NORMAL
NITRITE URINE: NEGATIVE
PH URINE: 8
PROTEIN URINE: ABNORMAL
RED BLOOD CELLS URINE: 4 /HPF
SPECIFIC GRAVITY URINE: 1.02
SQUAMOUS EPITHELIAL CELLS: 3 /HPF
UROBILINOGEN URINE: NORMAL
WHITE BLOOD CELLS URINE: 167 /HPF

## 2023-02-26 LAB — BACTERIA UR CULT: NORMAL

## 2023-03-13 NOTE — HISTORY OF PRESENT ILLNESS
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 Fluid-filled tubular structure and left adnexal fluid collection also tethered to this process may represent an abnormally dilated fallopian tube; infection/TOA not excluded.\par     b) Left lower quadrant colostomy with large parastomal hernia containing nonobstructed bowel.\par     c) Small droplets of gas in the urinary bladder. This could be related to recent instrumentation versus fistula. Correlate with clinical history.\par 2) CT A/P 22\par      a) Worsening of spiculated soft tissue in the lower small bowel mesentery likely representing inflammation. This is causing tethering of small bowel and sigmoid colon and loss of tissue planes between the small bowel, sigmoid and bladder dome. No colovesical or enterovesical fistula is demonstrated. The ureters are not involved by this process.\par      b) Moderate sized pocket of loculated ascites in the left lower quadrant.\par      c) Left abdominal loop colostomy with parastomal hernia containing small bowel.\par \par \par 35 yo referred by Dr. Saede for surgical discussion for possible need for hysterectomy vs salpingo-oopherectomy during the colostomy reversal. Pt has extensive h/o TOA c/b multiple fistulas, wound dehiscence leading to transverse loop colostomy 2029. Pt with worsening inflammation on 2023 CT scan despite being asymptomatic. \par Pt reports weight loss after bariatric sx with sleeve 200lb over a 1.5 years\par Currently no abdominal pain, fever/chills. +tolerating diet. + ostomy functioning\par \par \par OBHX: none\par GYNHX: regular menses, see above. denies fibroids and abn pap\par \par PMHX: none\par SX: see above\par  \par MED: none\par ALL: Zosyn: Rash\par  \par SOCIAL: smoker- 1/2 pack a day, same sex partner.  smokes.  lives in  where family and girlfriend who is a meds/ peds resident at [employer] [facility].\par FAMHx: Aunt: polycystic kidney, morbid obesity.  father  at 450 lbs and was alcholic\par \par Last pap: 2019- normal\par Last Colonoscopy: 22 polyp removed path neg. \par
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
## 2023-03-13 NOTE — PHYSICAL EXAM
[Abnormal] : Adnexa(ae): Abnormal [___] : a [unfilled] ~Ucm left mass [Normal] : Recto-Vaginal Exam: Normal [Fully active, able to carry on all pre-disease performance without restriction] : Status 0 - Fully active, able to carry on all pre-disease performance without restriction [Adnexa Tenderness On The Left] : nontender on the left [FreeTextEntry1] : wearing glasses, very pleasant [de-identified] : multiple incisons, has colostomy in LMQ, funcioning well.  healed low ransverse incision and lapsy lap band incisions

## 2023-03-13 NOTE — REVIEW OF SYSTEMS
[Recent Wt Loss___ Lbs] : recent weight loss of [unfilled] lbs [Negative] : Psychiatric [FreeTextEntry3] : very grateful [FreeTextEntry7] : s/p bariatric sleeve [de-identified] : colostomy functioning well

## 2023-03-13 NOTE — DISCUSSION/SUMMARY
[Reviewed Clinical Lab Test(s)] : Results of clinical tests were reviewed. [Reviewed Radiology Report(s)] : Radiology reports were reviewed. [Reviewed Radiology Film/Image(s)] : Images from radiology studies were reviewed and examined. [Discuss Tests w/Referring Providers] : Results of labs/radiology studies and the treatment recommendations were discussed with performing/referring physician. [Discuss Alternatives/Risks/Benefits w/Patient] : All alternatives, risks, and benefits were discussed with the patient/family and all questions were answered.  Patient expressed good understanding and appreciates the importance of follow up as recommended. [Pre Op] : The differential diagnosis was discussed in detail. The indications, risks, benefits and alternatives were discussed. [unfilled] expressed an understanding of the treatment rationale and her questions were answered to her apparent satisfaction. 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 Saeed\par Need records from previous operations\par Medical clearance from PCP

## 2023-03-13 NOTE — DISCUSSION/SUMMARY
[Reviewed Clinical Lab Test(s)] : Results of clinical tests were reviewed. [Reviewed Radiology Report(s)] : Radiology reports were reviewed. [Reviewed Radiology Film/Image(s)] : Images from radiology studies were reviewed and examined. [Discuss Tests w/Referring Providers] : Results of labs/radiology studies and the treatment recommendations were discussed with performing/referring physician. [Discuss Alternatives/Risks/Benefits w/Patient] : All alternatives, risks, and benefits were discussed with the patient/family and all questions were answered.  Patient expressed good understanding and appreciates the importance of follow up as recommended. [Pre Op] : The differential diagnosis was discussed in detail. The indications, risks, benefits and alternatives were discussed. [unfilled] expressed an understanding of the treatment rationale and her questions were answered to her apparent satisfaction. [FreeTextEntry1] : patient is currently living a good quality of life but does want to get rid of the colostomy.  \par She wants to know what the cause of the fistulas are and hopefully prevent further problems in the future.  \par She has no GYN complaints.  She has lost considerable amount of weight and we need to ensure she is adequately nourished before proceeding with procedure as high risk for complications as this. \par I will be available at the surgery if there is GYN organ involvement with the bowel and probable fistulas.\par involvement .  Patient awasre she may require  exlap total hysterectomy with bilateral salpingo-oophorectomy, or no GYN procedure at all.  This will be an extensive surgery with high risk and if GYN organs are not involved, at her age, I would not put her into surgical menopause without GYN pathology.  She understands she may need additional surgery in the future for GYN pathology if it occurs.  She indicated her understanding.  \par The inherent risks and benefits, surgical incisions have been reviewed in detail.  Complications that include, but are not limited to: bleeding, infection, injury to other organs including bowel, bladder, ureters, blood vessels, nerves, infections, blood clots, lymphedema, pneumonia, wound complications and prolonged hospital stay have all been discussed with the patient. I have also provided her with the diagrams.  \par \par All questions were answered to the patient’s apparent satisfaction. Patient has agreed to the above procedure.\par \par [] OR collaborate with Dr. Saeed\par []medical clearance

## 2023-03-13 NOTE — REVIEW OF SYSTEMS
[Negative] : Musculoskeletal [Depression] : depression [Recent Wt Loss___ Lbs] : recent weight loss of [unfilled] lbs [FreeTextEntry3] : had this due to diagnosis and clinical course, but getting better [de-identified] : colostomy functioning well

## 2023-03-13 NOTE — PHYSICAL EXAM
[Abnormal] : Adnexa(ae): Abnormal [___] : a [unfilled] ~Ucm left mass [Normal] : Recto-Vaginal Exam: Normal [Fully active, able to carry on all pre-disease performance without restriction] : Status 0 - Fully active, able to carry on all pre-disease performance without restriction [Adnexa Tenderness On The Left] : nontender on the left [FreeTextEntry1] : wearing glasses, very pleasant [de-identified] : multiple incisons, has colostomy in LMQ, funcioning well.  healed low ransverse incision and lapsy lap band incisions [de-identified] : v

## 2023-03-27 ENCOUNTER — APPOINTMENT (OUTPATIENT)
Dept: COLORECTAL SURGERY | Facility: CLINIC | Age: 38
End: 2023-03-27

## 2023-04-24 ENCOUNTER — APPOINTMENT (OUTPATIENT)
Dept: COLORECTAL SURGERY | Facility: CLINIC | Age: 38
End: 2023-04-24
Payer: MEDICAID

## 2023-04-24 VITALS
SYSTOLIC BLOOD PRESSURE: 122 MMHG | BODY MASS INDEX: 30 KG/M2 | TEMPERATURE: 98.1 F | HEART RATE: 80 BPM | DIASTOLIC BLOOD PRESSURE: 75 MMHG | RESPIRATION RATE: 18 BRPM | OXYGEN SATURATION: 100 % | WEIGHT: 169.38 LBS

## 2023-04-24 DIAGNOSIS — Z43.3 ENCOUNTER FOR ATTENTION TO COLOSTOMY: ICD-10-CM

## 2023-04-24 PROCEDURE — 99213 OFFICE O/P EST LOW 20 MIN: CPT | Mod: 24

## 2023-04-24 NOTE — HISTORY OF PRESENT ILLNESS
[FreeTextEntry1] : 37F PMH GERD and Left large ovarian abscess (2019) s/p drainage and cyst removal (benign pathology), colocutaneous fistula and with symptoms of colo-vesicular fistula now s/p transverse colostomy (11/2019) and resolution of fistula symptoms, now s/p ex lap, lysis of adhesions 1/26/2023.\par Denies fever or chills.  Colostomy is functioning well. Here for routine follow up. \par Denies urinary symptoms. Able to hold urine longer. No more urine leakage. \par

## 2023-04-24 NOTE — PHYSICAL EXAM
[Exam Deferred] : exam was deferred [Normal Breath Sounds] : Normal breath sounds [Normal Heart Sounds] : normal heart sounds [2+] : left 2+ [No Rash or Lesion] : No rash or lesion [Alert] : alert [Oriented to Person] : oriented to person [Oriented to Place] : oriented to place [Oriented to Time] : oriented to time [Calm] : calm [Abdomen Masses] : No abdominal masses [Abdomen Tenderness] : ~T No ~M abdominal tenderness [de-identified] : incisions closed, stoma is fine. still some suprapubic induration. [de-identified] : not done [de-identified] : NAD

## 2023-04-24 NOTE — REVIEW OF SYSTEMS
[Negative] : Heme/Lymph [Fever] : no fever [Chills] : no chills [Feeling Poorly] : not feeling poorly [Feeling Tired] : not feeling tired [Recent Weight Gain (___ Lbs)] : no recent weight gain [Recent Weight Loss (___ Lbs)] : no recent weight loss [Eye Pain] : no eye pain [Red Eyes] : eyes not red [Eyesight Problems] : no eyesight problems [Discharge From Eyes] : no purulent discharge from the eyes [Dry Eyes] : no dryness of the eyes [Eyes Itch] : no itching of the eyes [Earache] : no earache [Loss Of Hearing] : no hearing loss [Nosebleeds] : no nosebleeds [Nasal Discharge] : no nasal discharge [Sore Throat] : no sore throat [Hoarseness] : no hoarseness [Heart Rate Is Slow] : the heart rate was not slow [Heart Rate Is Fast] : the heart rate was not fast [Chest Pain] : no chest pain [Palpitations] : no palpitations [Leg Claudication] : no intermittent leg claudication [Lower Ext Edema] : no lower extremity edema [Shortness Of Breath] : no shortness of breath [Wheezing] : no wheezing [Cough] : no cough [SOB on Exertion] : no shortness of breath during exertion [Orthopnea] : no orthopnea [Abdominal Pain] : no abdominal pain [Vomiting] : no vomiting [Constipation] : no constipation [Diarrhea] : no diarrhea [Heartburn] : no heartburn [Melena] : no melena [Joint Swelling] : no joint swelling [Joint Stiffness] : no joint stiffness [Limb Pain] : no limb pain [Limb Swelling] : no limb swelling [Confused] : no confusion [Convulsions] : no convulsions [Dizziness] : no dizziness [Fainting] : no fainting [Suicidal] : not suicidal [Anxiety] : no anxiety [Depression] : no depression [Easy Bleeding] : no tendency for easy bleeding [Easy Bruising] : no tendency for easy bruising

## 2023-04-24 NOTE — ASSESSMENT
[FreeTextEntry1] : Patient 3 months s/p extensive DAVID (no bowel resection done then - we did not find any colon dx that warranted resection.)  Had had a colonoscopy done in Aprill 2022.   Hydrosalpinx and extensive SB adhesions found.  \par \par Usually we get a gastrograffine enema and repeat the flexible sigmoidoscopy and examinatio via the stoma distally prior to surgery.  Will still get these tests.  She wants to hold off for now.\par \par \par Discussed transverse loop colostomy closure.  May need MIS or open splenic flexure mobilization to do closure.  Risks of infection (leak, abscess, bowel injury, etc discussed).  Risk of bleeding trasnfusion, medical or other complication discussed.\par \par Will try and lose more weight.  Has lost good amount of weight.  \par \par

## 2023-06-12 ENCOUNTER — APPOINTMENT (OUTPATIENT)
Dept: COLORECTAL SURGERY | Facility: CLINIC | Age: 38
End: 2023-06-12
Payer: MEDICAID

## 2023-06-12 PROCEDURE — 99213 OFFICE O/P EST LOW 20 MIN: CPT | Mod: 95

## 2023-06-12 NOTE — ASSESSMENT
[FreeTextEntry1] : Discussed planned operation which is loop colostomy takedown and closure.\par May need MIS or hand assisted MIS laparosopy and DAVID and splenic flexure takedown.  May need full open op as well due to prior surgeries and ? adhesions.\par Risks of infection (leak, abscess, other) bleeding, recurrence of fistula symptoms (unlikely) an of other medical or surgical complications discussed.  \par All questions answered\par \par Colonoscopy and contrast study pending\par preop labs\par Doing more exercise and activity in preparation for the surgery.\par Operation scheduled for 6/27/23.

## 2023-06-12 NOTE — HISTORY OF PRESENT ILLNESS
[FreeTextEntry1] : Had GYN cystectomy and ? other procedure that led to pelvic abscess and colovesical and ? vesicocutaneous or colocutaneous (?colovesical) fistula that led to loop transverse colostomy made several  years ago.  Has since had a sleeve gastrectomy.  On Janury 26, 2023 had Expl lap and takedown of cutaneous fistula.  Did not find connection to colon and thus did not do a colon resection.\par Now to go for closure of loop transverse colostomy.   Been doing well since the last surgery.\par \par Is getting this week a colonoscopy and then, later, to get a contrast enema to verify that there are no fistulas remaining.

## 2023-06-14 ENCOUNTER — APPOINTMENT (OUTPATIENT)
Dept: COLORECTAL SURGERY | Facility: AMBULATORY SURGERY CENTER | Age: 38
End: 2023-06-14
Payer: MEDICAID

## 2023-06-14 ENCOUNTER — RESULT REVIEW (OUTPATIENT)
Age: 38
End: 2023-06-14

## 2023-06-14 PROCEDURE — 45381 COLONOSCOPY SUBMUCOUS NJX: CPT

## 2023-06-14 PROCEDURE — 44388 COLONOSCOPY THRU STOMA SPX: CPT | Mod: 59

## 2023-06-14 PROCEDURE — 45380 COLONOSCOPY AND BIOPSY: CPT

## 2023-06-19 ENCOUNTER — APPOINTMENT (OUTPATIENT)
Dept: RADIOLOGY | Facility: HOSPITAL | Age: 38
End: 2023-06-19
Payer: MEDICAID

## 2023-06-19 ENCOUNTER — RESULT REVIEW (OUTPATIENT)
Age: 38
End: 2023-06-19

## 2023-06-19 ENCOUNTER — OUTPATIENT (OUTPATIENT)
Dept: OUTPATIENT SERVICES | Facility: HOSPITAL | Age: 38
LOS: 1 days | End: 2023-06-19
Payer: MEDICAID

## 2023-06-19 DIAGNOSIS — Z98.890 OTHER SPECIFIED POSTPROCEDURAL STATES: Chronic | ICD-10-CM

## 2023-06-19 DIAGNOSIS — Z90.3 ACQUIRED ABSENCE OF STOMACH [PART OF]: Chronic | ICD-10-CM

## 2023-06-19 PROCEDURE — 74270 X-RAY XM COLON 1CNTRST STD: CPT

## 2023-06-19 PROCEDURE — 74270 X-RAY XM COLON 1CNTRST STD: CPT | Mod: 26

## 2023-07-10 ENCOUNTER — NON-APPOINTMENT (OUTPATIENT)
Age: 38
End: 2023-07-10

## 2023-07-11 ENCOUNTER — APPOINTMENT (OUTPATIENT)
Dept: COLORECTAL SURGERY | Facility: HOSPITAL | Age: 38
End: 2023-07-11

## 2023-07-11 ENCOUNTER — RESULT REVIEW (OUTPATIENT)
Age: 38
End: 2023-07-11

## 2023-07-25 ENCOUNTER — OUTPATIENT (OUTPATIENT)
Dept: OUTPATIENT SERVICES | Facility: HOSPITAL | Age: 38
LOS: 1 days | End: 2023-07-25
Payer: MEDICAID

## 2023-07-25 ENCOUNTER — APPOINTMENT (OUTPATIENT)
Dept: CT IMAGING | Facility: HOSPITAL | Age: 38
End: 2023-07-25

## 2023-07-25 DIAGNOSIS — Z98.890 OTHER SPECIFIED POSTPROCEDURAL STATES: Chronic | ICD-10-CM

## 2023-07-25 DIAGNOSIS — Z90.3 ACQUIRED ABSENCE OF STOMACH [PART OF]: Chronic | ICD-10-CM

## 2023-07-25 DIAGNOSIS — K63.2 FISTULA OF INTESTINE: ICD-10-CM

## 2023-07-25 PROCEDURE — 74176 CT ABD & PELVIS W/O CONTRAST: CPT | Mod: 26

## 2023-07-25 PROCEDURE — 74176 CT ABD & PELVIS W/O CONTRAST: CPT

## 2023-08-15 ENCOUNTER — APPOINTMENT (OUTPATIENT)
Dept: COLORECTAL SURGERY | Facility: CLINIC | Age: 38
End: 2023-08-15
Payer: MEDICAID

## 2023-08-15 VITALS
DIASTOLIC BLOOD PRESSURE: 81 MMHG | OXYGEN SATURATION: 99 % | HEART RATE: 78 BPM | TEMPERATURE: 97.7 F | SYSTOLIC BLOOD PRESSURE: 123 MMHG | HEIGHT: 63 IN | BODY MASS INDEX: 27.46 KG/M2 | WEIGHT: 155 LBS | RESPIRATION RATE: 15 BRPM

## 2023-08-15 DIAGNOSIS — K61.0 ANAL ABSCESS: ICD-10-CM

## 2023-08-15 PROCEDURE — 99215 OFFICE O/P EST HI 40 MIN: CPT | Mod: 25

## 2023-08-15 PROCEDURE — 10061 I&D ABSCESS COMP/MULTIPLE: CPT

## 2023-08-15 NOTE — PHYSICAL EXAM
[Abdomen Masses] : No abdominal masses [Abdomen Tenderness] : ~T No ~M abdominal tenderness [Normal rectal exam] : exam was normal [Excoriation] : no perianal excoriation [Fistula] : no fistulas [Wart] : no warts [Ulcer ___ cm] : no ulcers [Tender, Swollen] : nontender, non-swollen [Thrombosed] : that was not thrombosed [Skin Tags] : residual hemorrhoidal skin tags were noted [Normal Breath Sounds] : Normal breath sounds [Normal Heart Sounds] : normal heart sounds [Anxious] : anxious [de-identified] : benign, stoma is fine and appliance with stool in it.  No distension, tenderness or other [de-identified] : multiple superficial abscesses on both buttock.  4 discrete ones on left and 2 on right.  > 50% draining small amount of pus via pin point openings.  All tender (L > R), perianal area non tender and without any abscess or drainage areas.  anorectal exam WNL [de-identified] : NAD [de-identified] : buttock abscess x many (see above)

## 2023-08-15 NOTE — HISTORY OF PRESENT ILLNESS
[FreeTextEntry1] : 37F PMH GERD, Gastric sleeve and Left large ovarian abscess (2019) s/p drainage and cyst removal (benign pathology). 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 She was taken to OR on 1/26/23 for fistula take down and possible colostomy take down. Did not find connection to colon and thus did not do a colon resection. She only had ex lap and lysis of adhesions on 1/26/2023.  She was planned for colostomy closure in July. However, on x-ray barium enema on 6/21/23 showed Spiculated appearance of the distal descending colon and sigmoid colon. A subtle blind ending tract cannot be ruled out involving the proximal sigmoid. Correlation with f/u CTAP with retrograde injection of water-soluble contrast via the rectum was recommended.   CTAP was done on 7/25/23. It read, Extensive inflammatory changes in the left lower quadrant small bowel mesentery. There is a fistula between distal descending colon and sigmoid colon. Mildly dilated and thickened small bowel loop in the vicinity, grossly similar to 2022.  Patient is here today with c/o discharge from perianal area and to talk about next step.   Has some drainage from perianal area. Has some extra skin at perianal area from weight loss. She always keeps gauze between the buttocks because otherwise the skin rubs each other. The drainage started within last 7 days. Drainage is sometimes clear and sometimes like a pus. When there is pressure applied in the buttocks, that's when she has discharge. Noted some bumps on the skin as well. She tried hydrocortisone cream and Aquaphor with only temporary relief. Took a dose of Benadryl 25mg PO last night has noticed very minimal discharge and less inflamed today.   Denies any stool from anus. Has some mucus from anus when she has cramp which is rarely,  Had an episode of very faint LLQ pain last night. No pain now. Denies fever, or chills. Denies nausea or vomiting.   Ostomy functioning well. Stool are soft. Denies any leakage and bleeding. Denies pain with urination. Denies air from vagina or with urine.

## 2023-08-15 NOTE — REVIEW OF SYSTEMS
[Recent Weight Loss (___ Lbs)] : recent [unfilled] ~Ulb weight loss [Negative] : Heme/Lymph [Feeling Poorly] : not feeling poorly [Feeling Tired] : not feeling tired [Recent Weight Gain (___ Lbs)] : no recent weight gain [Nosebleeds] : no nosebleeds [Nasal Discharge] : no nasal discharge [Sore Throat] : no sore throat [Hoarseness] : no hoarseness [Chest Pain] : no chest pain [Palpitations] : no palpitations [Shortness Of Breath] : no shortness of breath [Wheezing] : no wheezing [Cough] : no cough [SOB on Exertion] : no shortness of breath during exertion [Abdominal Pain] : no abdominal pain [Vomiting] : no vomiting [Constipation] : no constipation [Diarrhea] : no diarrhea [Dysuria] : no dysuria [Incontinence] : no incontinence [Pelvic Pain] : no pelvic pain [Dysmenorrhea] : no dysmenorrhea [Vaginal Discharge] : no vaginal discharge [Abn Vaginal Bleeding] : no unexplained vaginal bleeding [Joint Swelling] : no joint swelling [Joint Stiffness] : no joint stiffness [Limb Pain] : no limb pain [Limb Swelling] : no limb swelling [Confused] : no confusion [Convulsions] : no convulsions [Dizziness] : no dizziness [Fainting] : no fainting [Suicidal] : not suicidal [Sleep Disturbances] : no sleep disturbances [Anxiety] : no anxiety [Depression] : no depression [Easy Bleeding] : no tendency for easy bleeding [Easy Bruising] : no tendency for easy bruising [FreeTextEntry2] : Intentional weight loss of about 10 lbs.

## 2023-08-15 NOTE — PROCEDURE
[FreeTextEntry1] : After discussion of pros and cons we got consent prone position, pillow under buttock, taped buttock laterally. betadine prep and sterile drape local 1/4% liodcaine with epinephrine used for each lesion which was "ringed" Multiple I & D's with scalpel x 5 (4 on the left and 1 on the right - none perianal).  Some seropus and small amount of pus from all and 1-2 ml drainage from right anterior (almost vulvar) abscess which was deeper as well. Irrigated all with H2O2 and then saline. DSD overall and taped in place.

## 2023-08-15 NOTE — ASSESSMENT
[FreeTextEntry1] : Patient has multiple bilateral subcutaneous abscesses well away from and not involving the anus  (Where the buttocks meet).  Most consistent with hidradenitis suppuritiva.  She has no hx of similar infections in pelvis, armpits or elsewhere.  Never had I and D either.  After consent, we did multiple I & D's x 5 (4 on the left and 1 on the right).  Some seropus and small amount of pus from all and 1-2 ml drainage from right anterior (almost vulvar) abscess which was deeper as well.    Keflex 500 mg bid.   oxycodone 5mg x 6 pills total Tylenol and advil otherwise recommended Tub soaks tid and after BM's RTC in 7-10 days unless does not improve or has recurrent symptoms.

## 2023-08-25 ENCOUNTER — APPOINTMENT (OUTPATIENT)
Dept: COLORECTAL SURGERY | Facility: CLINIC | Age: 38
End: 2023-08-25
Payer: MEDICAID

## 2023-08-25 VITALS
WEIGHT: 157 LBS | HEIGHT: 63 IN | BODY MASS INDEX: 27.82 KG/M2 | OXYGEN SATURATION: 98 % | SYSTOLIC BLOOD PRESSURE: 124 MMHG | DIASTOLIC BLOOD PRESSURE: 76 MMHG | TEMPERATURE: 98.2 F | RESPIRATION RATE: 15 BRPM | HEART RATE: 72 BPM

## 2023-08-25 DIAGNOSIS — L73.2 HIDRADENITIS SUPPURATIVA: ICD-10-CM

## 2023-08-25 PROCEDURE — 99024 POSTOP FOLLOW-UP VISIT: CPT

## 2023-08-25 NOTE — PHYSICAL EXAM
[de-identified] : Buttock process not perianal/anal.  No tenderness immediate perianal area.  Most Ia dn D sites are healed and few still have small bead of pus but are much improved. Has a single 1.3 cm fluctuant but not tender area L anterior  and another left para vulvar small draining area. Has pus draining (small amount only) from 3-4 other sites (tiny bead without associated induration or fluctuance).

## 2023-08-25 NOTE — HISTORY OF PRESENT ILLNESS
[FreeTextEntry1] : 37F PMH GERD, Gastric sleeve and Left large ovarian abscess (2019) s/p drainage and cyst removal (benign pathology). 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 She was taken to OR on 1/26/23 for fistula take down and possible colostomy take down. Did not find connection to colon and thus did not do a colon resection. She only had ex lap and lysis of adhesions on 1/26/2023.  She was planned for colostomy closure in July. However, on x-ray barium enema on 6/21/23 showed Spiculated appearance of the distal descending colon and sigmoid colon. A subtle blind ending tract cannot be ruled out involving the proximal sigmoid. Correlation with f/u CTAP with retrograde injection of water-soluble contrast via the rectum was recommended.   CTAP was done on 7/25/23. It read, Extensive inflammatory changes in the left lower quadrant small bowel mesentery. There is a fistula between distal descending colon and sigmoid colon. Mildly dilated and thickened small bowel loop in the vicinity, grossly similar to 2022.  Denies any stool from anus. Has some mucus from anus when she has cramp which is rarely,  Denies fever, or chills. Denies nausea or vomiting.   Ostomy functioning well. Stool are soft. Denies any leakage and bleeding. Denies pain with urination. Denies air from vagina or with urine.    She was seen last on 8/15/23 for some drainage from perianal area. On exam she was found to have multiple bilateral subcutaneous abscesses well away from and not involving the anus (Where the buttocks meet). Most consistent with hidradenitis suppuritiva. She has no hx of similar infections in pelvis, armpits or elsewhere. Never had I and D either. After consent, we did multiple I & D's x 5 (4 on the left and 1 on the right). Some seropus and small amount of pus from all and 1-2 ml drainage from right anterior (almost vulvar) abscess which was deeper as well. She was prescribed Keflex 500 mg bid for 10 days.  She is here today for follow up visit. Denies fever or chills. Discharge is significantly less. Took only one dose of oxycodone on the night of I and D. Pain is minimal. Taking antibiotic, only 2 pills left.  Doing Sitz baths 3 times a day religiously.

## 2023-08-25 NOTE — REVIEW OF SYSTEMS
[Negative] : Heme/Lymph [Skin Wound] : skin wound [Fever] : no fever [Chills] : no chills [Feeling Poorly] : not feeling poorly [Feeling Tired] : not feeling tired [Recent Weight Gain (___ Lbs)] : no recent weight gain [Recent Weight Loss (___ Lbs)] : no recent weight loss [Eyesight Problems] : no eyesight problems [Discharge From Eyes] : no purulent discharge from the eyes [Dry Eyes] : no dryness of the eyes [Eyes Itch] : no itching of the eyes [Earache] : no earache [Loss Of Hearing] : no hearing loss [Nosebleeds] : no nosebleeds [Nasal Discharge] : no nasal discharge [Sore Throat] : no sore throat [Hoarseness] : no hoarseness [Heart Rate Is Slow] : the heart rate was not slow [Heart Rate Is Fast] : the heart rate was not fast [Chest Pain] : no chest pain [Palpitations] : no palpitations [Leg Claudication] : no intermittent leg claudication [Lower Ext Edema] : no lower extremity edema [Shortness Of Breath] : no shortness of breath [Wheezing] : no wheezing [Cough] : no cough [SOB on Exertion] : no shortness of breath during exertion [Abdominal Pain] : no abdominal pain [Vomiting] : no vomiting [Constipation] : no constipation [Diarrhea] : no diarrhea [Dysuria] : no dysuria [Incontinence] : no incontinence [Pelvic Pain] : no pelvic pain [Dysmenorrhea] : no dysmenorrhea [Vaginal Discharge] : no vaginal discharge [Confused] : no confusion [Convulsions] : no convulsions [Dizziness] : no dizziness [Fainting] : no fainting [Suicidal] : not suicidal [Sleep Disturbances] : no sleep disturbances [Anxiety] : no anxiety [Depression] : no depression [Easy Bleeding] : no tendency for easy bleeding [Easy Bruising] : no tendency for easy bruising

## 2023-09-08 NOTE — HISTORY OF PRESENT ILLNESS
[FreeTextEntry1] : 37F PMH GERD, Gastric sleeve and Left large ovarian abscess (2019) s/p drainage and cyst removal (benign pathology). 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 She was taken to OR on 1/26/23 for fistula take down and possible colostomy take down. Did not find connection to colon and thus did not do a colon resection. She only had ex lap and lysis of adhesions on 1/26/2023.  She was planned for colostomy closure in July. However, on x-ray barium enema on 6/21/23 showed Spiculated appearance of the distal descending colon and sigmoid colon. A subtle blind ending tract cannot be ruled out involving the proximal sigmoid. Correlation with f/u CTAP with retrograde injection of water-soluble contrast via the rectum was recommended.   CTAP was done on 7/25/23. It read, Extensive inflammatory changes in the left lower quadrant small bowel mesentery. There is a fistula between distal descending colon and sigmoid colon. Mildly dilated and thickened small bowel loop in the vicinity, grossly similar to 2022.  Denies any stool from anus. Has some mucus from anus when she has cramp which is rarely,  Denies fever, or chills. Denies nausea or vomiting.   Ostomy functioning well. Stool are soft. Denies any leakage and bleeding. Denies pain with urination. Denies air from vagina or with urine.    On 8/15/23 she was seen for some drainage from perianal area. On exam she was found to have multiple bilateral subcutaneous abscesses well away from and not involving the anus (Where the buttocks meet). Most consistent with hidradenitis suppurative. She has no hx of similar infections in pelvis, armpits or elsewhere. Never had I and D either. After consent, we did multiple I & D's x 5 (4 on the left and 1 on the right). Some seropus and small amount of pus from all and 1-2 ml drainage from right anterior (almost vulvar) abscess which was deeper as well. She was prescribed Keflex 500 mg bid for 10 days.   On f/u visit on 8/25/23, perianal area looked much improved. She had a single 1.3 cm fluctuant but not tender area L anterior and another left para vulvar small draining area. Recommended I and D, but patient refused. One more week of Keflex was added to previous dose.   She is here today for follow up visit.

## 2023-09-25 ENCOUNTER — APPOINTMENT (OUTPATIENT)
Dept: COLORECTAL SURGERY | Facility: CLINIC | Age: 38
End: 2023-09-25

## 2023-09-25 ENCOUNTER — APPOINTMENT (OUTPATIENT)
Dept: COLORECTAL SURGERY | Facility: CLINIC | Age: 38
End: 2023-09-25
Payer: MEDICAID

## 2023-09-25 PROCEDURE — 99212 OFFICE O/P EST SF 10 MIN: CPT | Mod: 95

## 2023-10-02 ENCOUNTER — LABORATORY RESULT (OUTPATIENT)
Age: 38
End: 2023-10-02

## 2023-10-02 ENCOUNTER — APPOINTMENT (OUTPATIENT)
Dept: COLORECTAL SURGERY | Facility: CLINIC | Age: 38
End: 2023-10-02
Payer: MEDICAID

## 2023-10-02 VITALS
WEIGHT: 160 LBS | TEMPERATURE: 98 F | OXYGEN SATURATION: 100 % | SYSTOLIC BLOOD PRESSURE: 120 MMHG | RESPIRATION RATE: 15 BRPM | HEART RATE: 76 BPM | DIASTOLIC BLOOD PRESSURE: 73 MMHG | HEIGHT: 63 IN | BODY MASS INDEX: 28.35 KG/M2

## 2023-10-02 PROCEDURE — 99214 OFFICE O/P EST MOD 30 MIN: CPT

## 2023-10-03 ENCOUNTER — LABORATORY RESULT (OUTPATIENT)
Age: 38
End: 2023-10-03

## 2023-10-04 ENCOUNTER — NON-APPOINTMENT (OUTPATIENT)
Age: 38
End: 2023-10-04

## 2023-10-04 LAB
ALBUMIN SERPL ELPH-MCNC: 3.7 G/DL
ALP BLD-CCNC: 80 U/L
ALT SERPL-CCNC: 7 U/L
ANION GAP SERPL CALC-SCNC: 12 MMOL/L
APPEARANCE: ABNORMAL
AST SERPL-CCNC: 12 U/L
BASOPHILS # BLD AUTO: 0.02 K/UL
BASOPHILS NFR BLD AUTO: 0.3 %
BILIRUB SERPL-MCNC: 0.4 MG/DL
BILIRUBIN URINE: NEGATIVE
BLOOD URINE: ABNORMAL
BUN SERPL-MCNC: 9 MG/DL
CALCIUM SERPL-MCNC: 8.9 MG/DL
CHLORIDE SERPL-SCNC: 100 MMOL/L
CO2 SERPL-SCNC: 27 MMOL/L
COLOR: NORMAL
CREAT SERPL-MCNC: 0.62 MG/DL
EGFR: 118 ML/MIN/1.73M2
EOSINOPHIL # BLD AUTO: 0.21 K/UL
EOSINOPHIL NFR BLD AUTO: 3.1 %
GLUCOSE QUALITATIVE U: NEGATIVE MG/DL
GLUCOSE SERPL-MCNC: 86 MG/DL
HCG SERPL-MCNC: <1 MIU/ML
HCT VFR BLD CALC: 33.3 %
HGB BLD-MCNC: 10 G/DL
IMM GRANULOCYTES NFR BLD AUTO: 0.3 %
INR PPP: 1.07 RATIO
KETONES URINE: NEGATIVE MG/DL
LEUKOCYTE ESTERASE URINE: ABNORMAL
LYMPHOCYTES # BLD AUTO: 1.05 K/UL
LYMPHOCYTES NFR BLD AUTO: 15.6 %
MAN DIFF?: NORMAL
MCHC RBC-ENTMCNC: 23 PG
MCHC RBC-ENTMCNC: 30 GM/DL
MCV RBC AUTO: 76.6 FL
MONOCYTES # BLD AUTO: 0.52 K/UL
MONOCYTES NFR BLD AUTO: 7.7 %
NEUTROPHILS # BLD AUTO: 4.93 K/UL
NEUTROPHILS NFR BLD AUTO: 73 %
NITRITE URINE: NEGATIVE
PH URINE: 6
PLATELET # BLD AUTO: 254 K/UL
POTASSIUM SERPL-SCNC: 3.9 MMOL/L
PROT SERPL-MCNC: 7 G/DL
PROTEIN URINE: 100 MG/DL
PT BLD: 12.2 SEC
RBC # BLD: 4.35 M/UL
RBC # FLD: 15.4 %
SODIUM SERPL-SCNC: 139 MMOL/L
SPECIFIC GRAVITY URINE: 1.03
T PALLIDUM AB SER QL IA: NEGATIVE
UROBILINOGEN URINE: 1 MG/DL
WBC # FLD AUTO: 6.75 K/UL

## 2023-10-05 ENCOUNTER — NON-APPOINTMENT (OUTPATIENT)
Age: 38
End: 2023-10-05

## 2023-10-09 ENCOUNTER — TRANSCRIPTION ENCOUNTER (OUTPATIENT)
Age: 38
End: 2023-10-09

## 2023-10-09 VITALS
DIASTOLIC BLOOD PRESSURE: 73 MMHG | OXYGEN SATURATION: 98 % | TEMPERATURE: 98 F | SYSTOLIC BLOOD PRESSURE: 116 MMHG | RESPIRATION RATE: 16 BRPM | WEIGHT: 156.75 LBS | HEART RATE: 61 BPM | HEIGHT: 63 IN

## 2023-10-09 RX ORDER — FAMOTIDINE 10 MG/ML
1 INJECTION INTRAVENOUS
Qty: 0 | Refills: 0 | DISCHARGE

## 2023-10-09 RX ORDER — ALPRAZOLAM 0.25 MG
1 TABLET ORAL
Qty: 0 | Refills: 0 | DISCHARGE

## 2023-10-09 RX ORDER — IBUPROFEN 200 MG
1 TABLET ORAL
Qty: 0 | Refills: 0 | DISCHARGE

## 2023-10-09 NOTE — ASU PATIENT PROFILE, ADULT - NSICDXPASTMEDICALHX_GEN_ALL_CORE_FT
PAST MEDICAL HISTORY:  Colocutaneous fistula COLOSTOMY    GERD (gastroesophageal reflux disease)     Ovarian cyst     Tubo-ovarian abscess

## 2023-10-09 NOTE — ASU PATIENT PROFILE, ADULT - NSICDXPASTSURGICALHX_GEN_ALL_CORE_FT
PAST SURGICAL HISTORY:  H/O gastric sleeve 2021    History of arthroscopy left knee 2022;  left shoulder 2022 ;1 week apart    History of bowel diversion surgery transverse loop colostomy    S/P ovarian cystectomy

## 2023-10-10 ENCOUNTER — TRANSCRIPTION ENCOUNTER (OUTPATIENT)
Age: 38
End: 2023-10-10

## 2023-10-10 ENCOUNTER — OUTPATIENT (OUTPATIENT)
Dept: OUTPATIENT SERVICES | Facility: HOSPITAL | Age: 38
LOS: 1 days | Discharge: HOME | End: 2023-10-10
Payer: MEDICAID

## 2023-10-10 ENCOUNTER — NON-APPOINTMENT (OUTPATIENT)
Age: 38
End: 2023-10-10

## 2023-10-10 ENCOUNTER — RESULT REVIEW (OUTPATIENT)
Age: 38
End: 2023-10-10

## 2023-10-10 ENCOUNTER — APPOINTMENT (OUTPATIENT)
Age: 38
End: 2023-10-10

## 2023-10-10 DIAGNOSIS — Z98.890 OTHER SPECIFIED POSTPROCEDURAL STATES: Chronic | ICD-10-CM

## 2023-10-10 DIAGNOSIS — Z90.3 ACQUIRED ABSENCE OF STOMACH [PART OF]: Chronic | ICD-10-CM

## 2023-10-10 LAB
GLUCOSE BLDC GLUCOMTR-MCNC: 105 MG/DL — HIGH (ref 70–99)
SPECIMEN SOURCE: SIGNIFICANT CHANGE UP

## 2023-10-10 PROCEDURE — 88304 TISSUE EXAM BY PATHOLOGIST: CPT | Mod: 26

## 2023-10-10 PROCEDURE — 88305 TISSUE EXAM BY PATHOLOGIST: CPT | Mod: 26

## 2023-10-10 PROCEDURE — 46040 I&D ISCHIORCT&/PERIRCT ABSC: CPT

## 2023-10-10 RX ORDER — HEPARIN SODIUM 5000 [USP'U]/ML
5000 INJECTION INTRAVENOUS; SUBCUTANEOUS EVERY 12 HOURS
Refills: 0 | Status: DISCONTINUED | OUTPATIENT
Start: 2023-10-10 | End: 2023-10-10

## 2023-10-10 RX ORDER — KETOROLAC TROMETHAMINE 30 MG/ML
15 SYRINGE (ML) INJECTION EVERY 6 HOURS
Refills: 0 | Status: DISCONTINUED | OUTPATIENT
Start: 2023-10-10 | End: 2023-10-11

## 2023-10-10 RX ORDER — CEFAZOLIN SODIUM 1 G
VIAL (EA) INJECTION
Refills: 0 | Status: DISCONTINUED | OUTPATIENT
Start: 2023-10-10 | End: 2023-10-10

## 2023-10-10 RX ORDER — OXYCODONE HYDROCHLORIDE 5 MG/1
5 TABLET ORAL EVERY 6 HOURS
Refills: 0 | Status: DISCONTINUED | OUTPATIENT
Start: 2023-10-10 | End: 2023-10-11

## 2023-10-10 RX ORDER — HEPARIN SODIUM 5000 [USP'U]/ML
5000 INJECTION INTRAVENOUS; SUBCUTANEOUS EVERY 8 HOURS
Refills: 0 | Status: DISCONTINUED | OUTPATIENT
Start: 2023-10-10 | End: 2023-10-11

## 2023-10-10 RX ORDER — CEFAZOLIN SODIUM 1 G
2000 VIAL (EA) INJECTION EVERY 8 HOURS
Refills: 0 | Status: DISCONTINUED | OUTPATIENT
Start: 2023-10-11 | End: 2023-10-11

## 2023-10-10 RX ORDER — ALPRAZOLAM 0.25 MG
0.25 TABLET ORAL EVERY 8 HOURS
Refills: 0 | Status: DISCONTINUED | OUTPATIENT
Start: 2023-10-10 | End: 2023-10-11

## 2023-10-10 RX ORDER — ALPRAZOLAM 0.25 MG
1 TABLET ORAL
Refills: 0 | DISCHARGE

## 2023-10-10 RX ORDER — CEFAZOLIN SODIUM 1 G
2000 VIAL (EA) INJECTION ONCE
Refills: 0 | Status: DISCONTINUED | OUTPATIENT
Start: 2023-10-10 | End: 2023-10-10

## 2023-10-10 RX ORDER — ACETAMINOPHEN 500 MG
1000 TABLET ORAL EVERY 6 HOURS
Refills: 0 | Status: DISCONTINUED | OUTPATIENT
Start: 2023-10-10 | End: 2023-10-11

## 2023-10-10 RX ORDER — ONDANSETRON 8 MG/1
4 TABLET, FILM COATED ORAL EVERY 6 HOURS
Refills: 0 | Status: DISCONTINUED | OUTPATIENT
Start: 2023-10-10 | End: 2023-10-11

## 2023-10-10 RX ADMIN — Medication 0.25 MILLIGRAM(S): at 22:12

## 2023-10-10 RX ADMIN — Medication 15 MILLIGRAM(S): at 22:12

## 2023-10-10 RX ADMIN — ONDANSETRON 4 MILLIGRAM(S): 8 TABLET, FILM COATED ORAL at 19:24

## 2023-10-10 NOTE — BRIEF OPERATIVE NOTE - OPERATION/FINDINGS
Patient placed in lithotomy after induction of anesthesia and placement of LMA. On exam perianal ulceration of skin noticed. Biopsy taken of right posterior and midline perianal ulcer. Multiple bilateral buttock abscesses seen. Incision and drainage of abscesses done. Seton placed in L lateral abscess. Flexible sigmoidoscope performed and biopsies taken of sigmoid, proximal and distal rectal mucosa. Patient placed in lithotomy after induction of anesthesia and placement of LMA. On exam perianal ulceration of skin noticed. Biopsy taken of right posterior and midline perianal ulcer. Multiple bilateral buttock abscesses seen. Incision and drainage of abscesses done. Seton placed in L lateral abscess. Flexible sigmoidoscope performed and biopsies taken of sigmoid, proximal and distal rectal mucosa. Wound irrigated and small packing placed in Left posterior abscess cavity, dry dressing placed on top.

## 2023-10-10 NOTE — BRIEF OPERATIVE NOTE - SPECIMENS
right posterior perianal, posterior midline perianal, biopsy wound base posterior, posterior wound edge

## 2023-10-10 NOTE — BRIEF OPERATIVE NOTE - NSICDXBRIEFPROCEDURE_GEN_ALL_CORE_FT
PROCEDURES:  Incision and drainage, wound, buttock 10-Oct-2023 19:03:11  Nitza Odonnell  Placement, seton 10-Oct-2023 19:03:17  Nitza Odonnell  Surgical removal of lesion of perianal skin 10-Oct-2023 19:05:20  Nitza Odonnell  Flexible sigmoidoscopy 10-Oct-2023 19:05:29  Nitza Odonnell

## 2023-10-10 NOTE — BRIEF OPERATIVE NOTE - NSICDXBRIEFPOSTOP_GEN_ALL_CORE_FT
POST-OP DIAGNOSIS:  Ulcer of perianal area 10-Oct-2023 19:06:12  Nitza Odonnell  Abscess of buttock 10-Oct-2023 19:06:22  Nitza Odonnell

## 2023-10-10 NOTE — BRIEF OPERATIVE NOTE - NSICDXBRIEFPREOP_GEN_ALL_CORE_FT
PRE-OP DIAGNOSIS:  Perianal ulcer 10-Oct-2023 19:05:41  Nitza Odonnell  Abscess of buttock 10-Oct-2023 19:05:50  Nitza Odonnell

## 2023-10-10 NOTE — PRE-ANESTHESIA EVALUATION ADULT - NSANTHTOBACCOSD_GEN_ALL_CORE
Called patient.  She has had watery diarrhea for 3 weeks.  She feels it may have been related to taking lisinopril from a different .  Pharmacy has given her a week's worth of a different lot, with no change in symptoms.  OTC antidiarrheal medication has been slightly effective for bloating, diarrhea.  She has been having hand pains when urinating.  She does not recall suspect food, has not taken antibiotics recently.  No sick contacts.  Denies fever, chills, sore throat, dizziness, headache, palpitations, cough, rash, myalgia.  Advised to go to [facility] for further evaluation.    current smoker/Yes
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
## 2023-10-11 ENCOUNTER — NON-APPOINTMENT (OUTPATIENT)
Age: 38
End: 2023-10-11

## 2023-10-11 ENCOUNTER — TRANSCRIPTION ENCOUNTER (OUTPATIENT)
Age: 38
End: 2023-10-11

## 2023-10-11 VITALS
OXYGEN SATURATION: 99 % | RESPIRATION RATE: 17 BRPM | HEART RATE: 41 BPM | DIASTOLIC BLOOD PRESSURE: 81 MMHG | TEMPERATURE: 98 F | SYSTOLIC BLOOD PRESSURE: 135 MMHG

## 2023-10-11 LAB
NIGHT BLUE STAIN TISS: SIGNIFICANT CHANGE UP
SPECIMEN SOURCE: SIGNIFICANT CHANGE UP

## 2023-10-11 PROCEDURE — 86900 BLOOD TYPING SEROLOGIC ABO: CPT

## 2023-10-11 PROCEDURE — 87184 SC STD DISK METHOD PER PLATE: CPT

## 2023-10-11 PROCEDURE — 45331 SIGMOIDOSCOPY AND BIOPSY: CPT

## 2023-10-11 PROCEDURE — 87206 SMEAR FLUORESCENT/ACID STAI: CPT

## 2023-10-11 PROCEDURE — 87186 SC STD MICRODIL/AGAR DIL: CPT

## 2023-10-11 PROCEDURE — 88305 TISSUE EXAM BY PATHOLOGIST: CPT

## 2023-10-11 PROCEDURE — 87102 FUNGUS ISOLATION CULTURE: CPT

## 2023-10-11 PROCEDURE — 87116 MYCOBACTERIA CULTURE: CPT

## 2023-10-11 PROCEDURE — 88304 TISSUE EXAM BY PATHOLOGIST: CPT

## 2023-10-11 PROCEDURE — 86850 RBC ANTIBODY SCREEN: CPT

## 2023-10-11 PROCEDURE — 87070 CULTURE OTHR SPECIMN AEROBIC: CPT

## 2023-10-11 PROCEDURE — 87075 CULTR BACTERIA EXCEPT BLOOD: CPT

## 2023-10-11 PROCEDURE — 82962 GLUCOSE BLOOD TEST: CPT

## 2023-10-11 PROCEDURE — 46922 EXCISION OF ANAL LESION(S): CPT

## 2023-10-11 PROCEDURE — 86901 BLOOD TYPING SEROLOGIC RH(D): CPT

## 2023-10-11 RX ORDER — OXYCODONE HYDROCHLORIDE 5 MG/1
1 TABLET ORAL
Qty: 12 | Refills: 0
Start: 2023-10-11 | End: 2023-10-13

## 2023-10-11 RX ORDER — INFLUENZA VIRUS VACCINE 15; 15; 15; 15 UG/.5ML; UG/.5ML; UG/.5ML; UG/.5ML
0.5 SUSPENSION INTRAMUSCULAR ONCE
Refills: 0 | Status: DISCONTINUED | OUTPATIENT
Start: 2023-10-11 | End: 2023-10-11

## 2023-10-11 RX ORDER — PANTOPRAZOLE SODIUM 20 MG/1
40 TABLET, DELAYED RELEASE ORAL
Refills: 0 | Status: DISCONTINUED | OUTPATIENT
Start: 2023-10-11 | End: 2023-10-11

## 2023-10-11 RX ORDER — NALOXONE HYDROCHLORIDE 4 MG/.1ML
4 SPRAY NASAL
Qty: 1 | Refills: 0
Start: 2023-10-11 | End: 2023-10-11

## 2023-10-11 RX ORDER — ACETAMINOPHEN 500 MG
2 TABLET ORAL
Qty: 0 | Refills: 0 | DISCHARGE
Start: 2023-10-11

## 2023-10-11 RX ORDER — DOCUSATE SODIUM 100 MG
1 CAPSULE ORAL
Qty: 28 | Refills: 0
Start: 2023-10-11 | End: 2023-10-24

## 2023-10-11 RX ORDER — CEPHALEXIN 500 MG
1 CAPSULE ORAL
Qty: 16 | Refills: 0
Start: 2023-10-11 | End: 2023-10-14

## 2023-10-11 RX ORDER — HYDROMORPHONE HYDROCHLORIDE 2 MG/ML
0.25 INJECTION INTRAMUSCULAR; INTRAVENOUS; SUBCUTANEOUS ONCE
Refills: 0 | Status: DISCONTINUED | OUTPATIENT
Start: 2023-10-11 | End: 2023-10-11

## 2023-10-11 RX ADMIN — Medication 100 MILLIGRAM(S): at 00:42

## 2023-10-11 RX ADMIN — Medication 100 MILLIGRAM(S): at 09:09

## 2023-10-11 RX ADMIN — PANTOPRAZOLE SODIUM 40 MILLIGRAM(S): 20 TABLET, DELAYED RELEASE ORAL at 06:11

## 2023-10-11 RX ADMIN — OXYCODONE HYDROCHLORIDE 5 MILLIGRAM(S): 5 TABLET ORAL at 12:55

## 2023-10-11 RX ADMIN — HYDROMORPHONE HYDROCHLORIDE 0.25 MILLIGRAM(S): 2 INJECTION INTRAMUSCULAR; INTRAVENOUS; SUBCUTANEOUS at 00:34

## 2023-10-11 RX ADMIN — OXYCODONE HYDROCHLORIDE 5 MILLIGRAM(S): 5 TABLET ORAL at 06:55

## 2023-10-11 RX ADMIN — OXYCODONE HYDROCHLORIDE 5 MILLIGRAM(S): 5 TABLET ORAL at 05:55

## 2023-10-11 RX ADMIN — OXYCODONE HYDROCHLORIDE 5 MILLIGRAM(S): 5 TABLET ORAL at 11:55

## 2023-10-11 RX ADMIN — HYDROMORPHONE HYDROCHLORIDE 0.25 MILLIGRAM(S): 2 INJECTION INTRAMUSCULAR; INTRAVENOUS; SUBCUTANEOUS at 01:46

## 2023-10-11 NOTE — SBIRT NOTE ADULT - NSSBIRTSAVECONSULT_GEN_A_CORE
----- Message from Kate Khoury RN sent at 8/10/2021  2:29 PM CDT -----  Regarding: STEPHANIE Haddad will go for his INR on Tuesday 8/24/21 to [facility] in [place]. This is a 2 week recheck.      Complete

## 2023-10-11 NOTE — ASU DISCHARGE PLAN (ADULT/PEDIATRIC) - CARE PROVIDER_API CALL
Jerry Saeed)  ColonRectal Surgery  [address], [place] [address]  Phone: [phone]  Fax: [phone]  Follow Up Time: 2 weeks

## 2023-10-11 NOTE — PATIENT PROFILE ADULT - FALL HARM RISK - HARM RISK INTERVENTIONS

## 2023-10-11 NOTE — PROGRESS NOTE ADULT - ASSESSMENT
37 year old female with PMHx GERD, gastric sleeve and left large ovarian abscess (2019) s/p drainage and cyst removal (benign pathology), and multiple bilateral subcutaneous abscesses without anus involvement. S/p EUA, biopsy of perianal, I&D of bilateral buttock abscess with seton placement, and Flex sig with biospy (10/10).     23hr obs  Regular diet  Ancef while in-patient  Pain/nausea control PRN  Home meds as appropriate  Wound care consult
A/P D/c home after WOCN consult to evaluate for pyoderma gangrenosum around stoma  Sitz bathd 3-4 x/day at [facility]  Po keflex x 5 days  DPO f/u in 1-2 weeks.  Dressings needed between soaks.

## 2023-10-11 NOTE — ASU DISCHARGE PLAN (ADULT/PEDIATRIC) - BATHING
Shower only/Sitz bath (specify frequency) three times a day and after every bowel movement/Shower only/Sitz bath (specify frequency)

## 2023-10-11 NOTE — PATIENT PROFILE ADULT - FALL HARM RISK - PATIENT NEEDS ASSISTANCE
Patient contacted and confirmed reported home meter INR results and given instructions. See Ambulatory Anticoagulation Flowsheet.   Dx: PAF      Goal range: 2.0-3.0  Last INR was 2.7 on 2/21/19 per home meter. Dose maintained.  Today's INR is 2.8 per home meter.  Dose maintained as per protocol.  Recheck 1 wk per home meter.     Reviewed no noted interaction with Ceftin per micromedex and verbalized understanding. Reviewed request that pt contact [facility] with new or changes in medications particularly antibiotics and verbalized understanding.    Patient reminded to call with any missed doses or extra doses, unusual bleeding or bruising,changes in diet, medications, health or lifestyle. Pt reminded to call with any other questions or concerns regarding anticoagulation therapy.  Patient verbalized understanding of instructions.  Dr. Burrell is in office today supervising treatment. Note forwarded to physician for review    
Patient reporting INR results taken with home monitor today.  INR 2.8, goal 2.0-3.0    Patient will only be available till 11:30 AM today.  
Reviewed and agree.    
No assistance needed

## 2023-10-11 NOTE — ASU DISCHARGE PLAN (ADULT/PEDIATRIC) - NS MD DC FALL RISK RISK
For information on Fall & Injury Prevention, visit: [email or web address] OR  [email or web address] OR  https://www.cdc.gov/steadi/patient.html

## 2023-10-11 NOTE — DISCHARGE NOTE NURSING/CASE MANAGEMENT/SOCIAL WORK - PATIENT PORTAL LINK FT
You can access the FollowMyHealth Patient Portal offered by [facility] by registering at the following website: [email or web address]. By joining [employer]’s FollowMyHealth portal, you will also be able to view your health information using other applications (apps) compatible with our system.

## 2023-10-11 NOTE — ASU DISCHARGE PLAN (ADULT/PEDIATRIC) - ASU DC SPECIAL INSTRUCTIONSFT
Warning Signs:  Please call your doctor or nurse practitioner if you experience the following:  *You experience new chest pain, pressure, squeezing or tightness.  *New or worsening cough, shortness of breath, or wheeze.  *If you are vomiting and cannot keep down fluids or your medications.  *You are getting dehydrated due to continued vomiting, diarrhea, or other reasons. Signs of dehydration include dry mouth, rapid heartbeat, or feeling dizzy or faint when standing.  *You see blood or dark/black material when you vomit or have a bowel movement.  *You experience burning when you urinate, have blood in your urine, or experience a discharge.  *Your pain is not improving within 8-12 hours or is not gone within 24 hours. Call or return immediately if your pain is getting worse, changes location, or moves to your chest or back.  *You have shaking chills, or fever greater than 101.5 degrees Fahrenheit or 38 degrees Celsius.  *Any change in your symptoms, or any new symptoms that concern you. Dressing changes: change dressing as needed, and at least once daily    Warning Signs:  Please call your doctor or nurse practitioner if you experience the following:  *You experience new chest pain, pressure, squeezing or tightness.  *New or worsening cough, shortness of breath, or wheeze.  *If you are vomiting and cannot keep down fluids or your medications.  *You are getting dehydrated due to continued vomiting, diarrhea, or other reasons. Signs of dehydration include dry mouth, rapid heartbeat, or feeling dizzy or faint when standing.  *You see blood or dark/black material when you vomit or have a bowel movement.  *You experience burning when you urinate, have blood in your urine, or experience a discharge.  *Your pain is not improving within 8-12 hours or is not gone within 24 hours. Call or return immediately if your pain is getting worse, changes location, or moves to your chest or back.  *You have shaking chills, or fever greater than 101.5 degrees Fahrenheit or 38 degrees Celsius.  *Any change in your symptoms, or any new symptoms that concern you.

## 2023-10-11 NOTE — PATIENT PROFILE ADULT - FUNCTIONAL ASSESSMENT - BASIC MOBILITY 6.
4-calculated by average/Not able to assess (calculate score using [facility] averaging method)

## 2023-10-11 NOTE — ASU DISCHARGE PLAN (ADULT/PEDIATRIC) - PROCEDURE
EUA, biopsy of perianal , I&D of bilateral buttock abscess with seton placement, and Flex sig with biospy

## 2023-10-11 NOTE — DISCHARGE NOTE NURSING/CASE MANAGEMENT/SOCIAL WORK - NSDCPEFALRISK_GEN_ALL_CORE
For information on Fall & Injury Prevention, visit: [email or web address] OR  [email or web address] OR  https://www.cdc.gov/steadi/patient.html

## 2023-10-11 NOTE — ADVANCED PRACTICE NURSE CONSULT - ASSESSMENT
37 year old female with PMHx GERD, gastric sleeve and left large ovarian abscess (2019) s/p drainage and cyst removal (benign pathology), and multiple bilateral subcutaneous abscesses without anus involvement. S/p EUA, biopsy of perianal, I&D of bilateral buttock abscess with seton placement, and Flex sig with biospy (10/10). Pt with 3 small lesions at peristomal area (2 o'clock) with purulent drainage noted when old appliance removed. Aquacel AG silver hydrofiber applied over sites after area soaked with Vashe wound cleanser and 4x4s then Wayne flextend wafer placed over this. Stoma ring applied around stoma and flat 1 piece appliance used. Advised patient not to use convexity while lesions are present. Pt given extra supplies for discharge, is aware of wound care procedure.

## 2023-10-11 NOTE — PROGRESS NOTE ADULT - SUBJECTIVE AND OBJECTIVE BOX
Procedure: Incision and drainage, wound, buttock with seton placement, surgical removal of lesion of perianal skin, and flexible sigmoidoscopy  Surgeon: Dr. Saeed    S: Pt seen and examined bedside. She has no acute complaints. Tolerating her regular diet. Denies CP, SOB, DUPREE, calf tenderness or edema, nausea, emesis, or fevers.    O:  T(C): 36.6 (10-10-23 @ 21:38), Max: 36.6 (10-10-23 @ 21:38)  T(F): 97.8 (10-10-23 @ 21:38), Max: 97.8 (10-10-23 @ 21:38)  HR: 45 (10-10-23 @ 21:38) (40 - 55)  BP: 127/77 (10-10-23 @ 21:38) (123/59 - 127/77)  RR: 18 (10-10-23 @ 21:38) (14 - 22)  SpO2: 98% (10-10-23 @ 21:38) (98% - 100%)  Wt(kg): --        Gen: NAD, resting comfortably in bed  C/V: NSR  Pulm: Nonlabored breathing, no respiratory distress  Abd: soft NTND  Rectal: packing in place, area is soft and c/d/i  Extrem: WWP, no calf edema or tenderness, SCDs in place      A/P: 37 year old female with PMHx GERD, gastric sleeve and left large ovarian abscess (2019) s/p drainage and cyst removal (benign pathology), and multiple bilateral subcutaneous abscesses without anus involvement. S/p EUA, biopsy of perianal biopsy, I&D of bilateral buttock abscess with seton placement, and Flex sig with biospy (10/10).     23hr obs  Regular diet  Ancef while in-patient  Pain/nausea control PRN  Home meds as appropriate  
STATUS POST: I&D of buttock wound w/ seton placement, surgical removal of perianal lesion, flex sigmoid    POST OP DAY # 1    SUBJECTIVE: Pt seen and examined at bedside. No complaints. Denies N/V, tolerating diet. Denies CP/SOB.      MEDICATIONS  (STANDING):  acetaminophen     Tablet .. 1000 milliGRAM(s) Oral every 6 hours  heparin   Injectable 5000 Unit(s) SubCutaneous every 8 hours  influenza   Vaccine 0.5 milliLiter(s) IntraMuscular once  pantoprazole    Tablet 40 milliGRAM(s) Oral before breakfast    MEDICATIONS  (PRN):  ALPRAZolam 0.25 milliGRAM(s) Oral every 8 hours PRN for anxiety  ketorolac   Injectable 15 milliGRAM(s) IV Push every 6 hours PRN Moderate Pain (4 - 6)  ondansetron Injectable 4 milliGRAM(s) IV Push every 6 hours PRN Nausea  oxyCODONE    IR 5 milliGRAM(s) Oral every 6 hours PRN Severe Pain (7 - 10)      Vital Signs Last 24 Hrs  T(C): 36.4 (11 Oct 2023 04:44), Max: 36.8 (10 Oct 2023 18:27)  T(F): 97.5 (11 Oct 2023 04:44), Max: 98.3 (10 Oct 2023 18:27)  HR: 44 (11 Oct 2023 04:44) (40 - 64)  BP: 121/72 (11 Oct 2023 04:44) (102/54 - 130/66)  BP(mean): 88 (11 Oct 2023 04:44) (76 - 92)  RR: 16 (11 Oct 2023 04:44) (13 - 22)  SpO2: 100% (11 Oct 2023 04:44) (98% - 100%)    Parameters below as of 11 Oct 2023 04:44  Patient On (Oxygen Delivery Method): room air        PHYSICAL EXAM:  General: NAD, pt resting comfortably in bed  Pulm: No respiratory distress, nonlabored breathing, on room air  CVS: NSR, HDS  Abd: Soft, NT, ND. Colostomy in place.  Rectal: Packing in place, redressed  Extrem: WWP, no calf edema. SCDs in place                    I&O's Detail    10 Oct 2023 07:01  -  11 Oct 2023 07:00  --------------------------------------------------------  IN:  Total IN: 0 mL    OUT:    Voided (mL): 300 mL  Total OUT: 300 mL    Total NET: -300 mL          LABS:                RADIOLOGY & ADDITIONAL STUDIES:
POD 1  Feel ok.  No N/V  Voiding  Having bowel function    Vital Signs Last 24 Hrs  T(C): 36.4 (11 Oct 2023 04:44), Max: 36.8 (10 Oct 2023 18:27)  T(F): 97.5 (11 Oct 2023 04:44), Max: 98.3 (10 Oct 2023 18:27)  HR: 44 (11 Oct 2023 04:44) (40 - 64)  BP: 121/72 (11 Oct 2023 04:44) (102/54 - 130/66)  BP(mean): 88 (11 Oct 2023 04:44) (76 - 92)  RR: 16 (11 Oct 2023 04:44) (13 - 22)  SpO2: 100% (11 Oct 2023 04:44) (98% - 100%)    Parameters below as of 11 Oct 2023 04:44  Patient On (Oxygen Delivery Method): room air    abd benign  perineum dressing intact, serosanguinous d/c only    No labs

## 2023-10-12 LAB
CULTURE RESULTS: SIGNIFICANT CHANGE UP
METHOD TYPE: SIGNIFICANT CHANGE UP
ORGANISM # SPEC MICROSCOPIC CNT: SIGNIFICANT CHANGE UP
SPECIMEN SOURCE: SIGNIFICANT CHANGE UP
SPECIMEN SOURCE: SIGNIFICANT CHANGE UP

## 2023-10-18 LAB
SURGICAL PATHOLOGY STUDY: SIGNIFICANT CHANGE UP
SURGICAL PATHOLOGY STUDY: SIGNIFICANT CHANGE UP

## 2023-10-19 ENCOUNTER — APPOINTMENT (OUTPATIENT)
Dept: COLORECTAL SURGERY | Facility: CLINIC | Age: 38
End: 2023-10-19
Payer: MEDICAID

## 2023-10-19 VITALS
RESPIRATION RATE: 16 BRPM | TEMPERATURE: 97.88 F | BODY MASS INDEX: 27 KG/M2 | SYSTOLIC BLOOD PRESSURE: 109 MMHG | HEART RATE: 60 BPM | DIASTOLIC BLOOD PRESSURE: 66 MMHG | OXYGEN SATURATION: 100 % | HEIGHT: 63 IN | WEIGHT: 152.38 LBS

## 2023-10-19 DIAGNOSIS — K52.9 NONINFECTIVE GASTROENTERITIS AND COLITIS, UNSPECIFIED: ICD-10-CM

## 2023-10-19 DIAGNOSIS — Z98.890 OTHER SPECIFIED POSTPROCEDURAL STATES: ICD-10-CM

## 2023-10-19 PROCEDURE — 99024 POSTOP FOLLOW-UP VISIT: CPT

## 2023-10-24 ENCOUNTER — NON-APPOINTMENT (OUTPATIENT)
Age: 38
End: 2023-10-24

## 2023-10-27 ENCOUNTER — LABORATORY RESULT (OUTPATIENT)
Age: 38
End: 2023-10-27

## 2023-11-01 LAB
BAKER'S YEAST AB QL: 47.7 UNITS
BAKER'S YEAST IGA QL IA: 50.2 UNITS
BAKER'S YEAST IGA QN IA: POSITIVE
BAKER'S YEAST IGG QN IA: POSITIVE

## 2023-11-03 ENCOUNTER — APPOINTMENT (OUTPATIENT)
Dept: COLORECTAL SURGERY | Facility: CLINIC | Age: 38
End: 2023-11-03
Payer: MEDICAID

## 2023-11-03 VITALS
DIASTOLIC BLOOD PRESSURE: 87 MMHG | WEIGHT: 156.13 LBS | HEART RATE: 86 BPM | HEIGHT: 63 IN | OXYGEN SATURATION: 98 % | TEMPERATURE: 97.7 F | BODY MASS INDEX: 27.66 KG/M2 | SYSTOLIC BLOOD PRESSURE: 133 MMHG | RESPIRATION RATE: 17 BRPM

## 2023-11-03 PROCEDURE — 99214 OFFICE O/P EST MOD 30 MIN: CPT | Mod: 24

## 2023-11-08 ENCOUNTER — APPOINTMENT (OUTPATIENT)
Dept: GASTROENTEROLOGY | Facility: CLINIC | Age: 38
End: 2023-11-08
Payer: MEDICAID

## 2023-11-08 VITALS
BODY MASS INDEX: 27.64 KG/M2 | SYSTOLIC BLOOD PRESSURE: 112 MMHG | OXYGEN SATURATION: 98 % | DIASTOLIC BLOOD PRESSURE: 68 MMHG | HEIGHT: 63 IN | WEIGHT: 156 LBS | RESPIRATION RATE: 16 BRPM | TEMPERATURE: 98 F | HEART RATE: 80 BPM

## 2023-11-08 LAB

## 2023-11-08 PROCEDURE — 99205 OFFICE O/P NEW HI 60 MIN: CPT

## 2023-11-08 RX ORDER — ACETAMINOPHEN 325 MG/1
TABLET, FILM COATED ORAL
Refills: 0 | Status: ACTIVE | COMMUNITY

## 2023-11-08 RX ORDER — IBUPROFEN 600 MG/1
600 TABLET, FILM COATED ORAL EVERY 6 HOURS
Qty: 28 | Refills: 0 | Status: COMPLETED | COMMUNITY
Start: 2023-08-15 | End: 2023-11-08

## 2023-11-08 RX ORDER — NYSTATIN 100000 1/G
100000 POWDER TOPICAL
Qty: 1 | Refills: 2 | Status: COMPLETED | COMMUNITY
Start: 2023-02-13 | End: 2023-11-08

## 2023-11-08 RX ORDER — OXYCODONE 5 MG/1
5 TABLET ORAL EVERY 6 HOURS
Qty: 6 | Refills: 0 | Status: COMPLETED | COMMUNITY
Start: 2023-08-15 | End: 2023-11-08

## 2023-11-08 RX ORDER — METRONIDAZOLE 500 MG/1
500 TABLET ORAL
Qty: 6 | Refills: 0 | Status: COMPLETED | COMMUNITY
Start: 2023-06-13 | End: 2023-11-08

## 2023-11-08 RX ORDER — CEPHALEXIN 500 MG/1
500 CAPSULE ORAL
Qty: 14 | Refills: 0 | Status: COMPLETED | COMMUNITY
Start: 2023-08-15 | End: 2023-11-08

## 2023-11-08 RX ORDER — NEOMYCIN SULFATE 500 MG/1
500 TABLET ORAL
Qty: 6 | Refills: 0 | Status: COMPLETED | COMMUNITY
Start: 2023-06-13 | End: 2023-11-08

## 2023-11-08 NOTE — ED PROVIDER NOTE - PSYCHIATRIC, MLM
1.69 Alert and oriented to person, place, time/situation. normal mood and affect. no apparent risk to self or others.

## 2023-11-09 LAB
DEPRECATED KAPPA LC FREE/LAMBDA SER: 1.79 RATIO
HBV CORE IGG+IGM SER QL: NONREACTIVE
HBV CORE IGM SER QL: NONREACTIVE
HBV SURFACE AB SER QL: NONREACTIVE
HBV SURFACE AG SER QL: NONREACTIVE
IGA SER QL IEP: 361 MG/DL
IGD SER-MCNC: 2 MG/DL
IGG SER QL IEP: 1364 MG/DL
IGG4 SER-MCNC: 9.5 MG/DL
IGM SER QL IEP: 119 MG/DL
KAPPA LC CSF-MCNC: 2.23 MG/DL
KAPPA LC SERPL-MCNC: 3.99 MG/DL

## 2023-11-13 LAB
IGE SER-MCNC: 22 KU/L
M TB IFN-G BLD-IMP: NEGATIVE
QUANTIFERON TB PLUS MITOGEN MINUS NIL: 0.84 IU/ML
QUANTIFERON TB PLUS NIL: 0.02 IU/ML
QUANTIFERON TB PLUS TB1 MINUS NIL: 0.01 IU/ML
QUANTIFERON TB PLUS TB2 MINUS NIL: 0 IU/ML

## 2023-11-16 NOTE — CHART NOTE - NSCHARTNOTESELECT_GEN_ALL_CORE
Called and left message for patient to please return call. Patient has appt on 11/20 with [facility] at 0820. She can fast but it is not a requirement. [facility] out until Monday. She will place orders for labs the day of appt.     Carolina Moore LPN on 11/16/2023 at 4:08 PM      POST OP CHECK/Event Note

## 2023-11-25 LAB

## 2023-12-14 ENCOUNTER — APPOINTMENT (OUTPATIENT)
Dept: COLORECTAL SURGERY | Facility: CLINIC | Age: 38
End: 2023-12-14
Payer: MEDICAID

## 2023-12-14 VITALS
HEIGHT: 63 IN | HEART RATE: 87 BPM | DIASTOLIC BLOOD PRESSURE: 74 MMHG | WEIGHT: 158 LBS | BODY MASS INDEX: 28 KG/M2 | OXYGEN SATURATION: 100 % | RESPIRATION RATE: 15 BRPM | SYSTOLIC BLOOD PRESSURE: 118 MMHG | TEMPERATURE: 98.6 F

## 2023-12-14 PROCEDURE — 99214 OFFICE O/P EST MOD 30 MIN: CPT | Mod: 24

## 2023-12-14 NOTE — HISTORY OF PRESENT ILLNESS
[FreeTextEntry1] : 38 F with PMH GERD, Gastric sleeve and Left large ovarian abscess (2019) s/p drainage and cyst removal (benign pathology). 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 She was taken to OR on 1/26/23 for fistula take down and possible colostomy take down. Did not find connection to colon and thus did not do a colon resection. She only had ex lap and lysis of adhesions on 1/26/2023.  She was planned for colostomy closure in July. However, on x-ray barium enema on 6/21/23 showed Spiculated appearance of the distal descending colon and sigmoid colon. A subtle blind ending tract cannot be ruled out involving the proximal sigmoid. Correlation with f/u CTAP with retrograde injection of water-soluble contrast via the rectum was recommended.   CTAP was done on 7/25/23. It read, Extensive inflammatory changes in the left lower quadrant small bowel mesentery. There is a fistula between distal descending colon and sigmoid colon. Mildly dilated and thickened small bowel loop in the vicinity, grossly similar to 2022.  On 8/15/23 she was seen for some drainage from perianal area. On exam she was found to have multiple bilateral subcutaneous abscesses well away from and not involving the anus and had I and D done by Dr. Saeed in office.  On 10/2/23, she had new posterior long vertical midline ulceration. EUA planned.  She is now s/p EUA, biopsy of perianal, I&D of bilateral buttock abscess with seton placement, and Flex sig with biopsy on 10/10/23.   Pathology of ulcers and wounds showed benign mucosa with acute and chronic inflammation. Pathology of rectosigmoid, proximal and distal rectum biopsies showed Colonic mucosa with focal active colitis, mild architecture distortion and occasional noncaseating granuloma.   Blood test for ASCA IgA/IgG Antibodies were positive and Anti Neutrophil Cytoplasmic Antibody was negative.   Patient is here for follow up visit today.

## 2023-12-14 NOTE — ASSESSMENT
[FreeTextEntry1] : Presesnted at IBD conference and there was no consensus on the case.   Entire hx reviewed.   Idea that she may have had SB or LB Crohns all along floated.  Her course does not support that dx.  Buttock abscesses and parastomal findings noted but origin or name unclear.  Patient will see Dr. Shepherd soon and may start remicaid or other IBD meds.  From viewpoint of the buttock and perianal infections and parastomal ulceration she is notably better but the small infections in some areas persist.  Nothing warranted drainag via I and D today.  She is eating ok and maintiaining her weight.  No N/V.  Stoma functioning. Has 4 minutes of severe abdominal pain 1-3 x/day not assoicated with N/V.     She knows to call us if the buttock infections get worse.   Also describes mucus d/c from anus soon after she gets foot numbness ((0-3 x/day).  No significant  trans anal drainage.   Remain unclear on what is her main problem and how to connect, if possible, the various elements of her diseases/processes.

## 2023-12-14 NOTE — PHYSICAL EXAM
[Abdomen Masses] : No abdominal masses [Abdomen Tenderness] : ~T No ~M abdominal tenderness [Excoriation] : excoriations [Fistula] : a fistula [Wart] : no warts [Ulcer ___ cm] : a [unfilled] ~Ucm ulcer [de-identified] : stoma fine, no distension, tenderness, or masses noted.  Lower abdominal wall thickended but not tender [de-identified] : left sided buttock draining sties and pus draining small sites are better and there is much less induration.  Right side with a bit more induration.  Multiple 4-5 cm long prior I and D sites that are not closing but remain open. skin bridges as well.  Now a few tag like structures near the anus.  No perianal abscess.  No digital or anoscopy done and no anal complaints [de-identified] : see above.  Buttock elements worse than perianal.

## 2023-12-20 ENCOUNTER — APPOINTMENT (OUTPATIENT)
Dept: GASTROENTEROLOGY | Facility: CLINIC | Age: 38
End: 2023-12-20
Payer: MEDICAID

## 2023-12-20 VITALS
WEIGHT: 160 LBS | DIASTOLIC BLOOD PRESSURE: 60 MMHG | OXYGEN SATURATION: 98 % | TEMPERATURE: 97.3 F | HEART RATE: 72 BPM | BODY MASS INDEX: 28.35 KG/M2 | SYSTOLIC BLOOD PRESSURE: 93 MMHG | RESPIRATION RATE: 16 BRPM | HEIGHT: 63 IN

## 2023-12-20 DIAGNOSIS — K63.2 FISTULA OF INTESTINE: ICD-10-CM

## 2023-12-20 PROCEDURE — 99215 OFFICE O/P EST HI 40 MIN: CPT | Mod: 25

## 2023-12-22 NOTE — PHYSICAL EXAM
[Abdomen Tenderness] : non-tender [Abdomen Soft] : soft [] : no hepatosplenomegaly [de-identified] : colostomy with semi-formed brown stool, surgical scar [de-identified] : numerous perivaginal/perirectal fistulas s/p I&D the largest of which was 3-4cm, with no-minimal drainage present

## 2023-12-22 NOTE — HISTORY OF PRESENT ILLNESS
[FreeTextEntry1] : 39 yo F with PMH of GERD, Gastric sleeve and large left ovarian abscess () s/p drainage and cyst removal (benign pathology). 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 She was taken to OR on 23 for fistula take down and possible colostomy take down. A connection to the colon was not found and thus a colon resection was not done. She only had ex lap and lysis of adhesions on 2023. She presents today in person to discuss a plan for treatment as we have discussed her case at IBD conference and are confident after reviewing records with interdisciplinary team, she has severe fistulizing Crohn's disease.   She was planned for colostomy closure in July. However, on x-ray barium enema on 23 there was a spiculated appearance of the distal descending colon and sigmoid colon. A subtle blind ending tract cannot be ruled out involving the proximal sigmoid. Correlation with f/u CTAP with retrograde injection of water-soluble contrast via the rectum was recommended. CTAP was done on 23. It read, Extensive inflammatory changes in the left lower quadrant small bowel mesentery. There is a fistula between distal descending colon and sigmoid colon. Mildly dilated and thickened small bowel loop in the vicinity, grossly similar to .  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 On 8/15/23 she was seen for some drainage from perianal area. On exam she was found to have multiple bilateral subcutaneous abscesses not involving the anus and had an I&D done by Dr. Saeed in office. On 10/2/23, she developed new posterior long vertical midline ulceration. EUA planned. She is now s/p EUA, biopsy of perianal, I&D of bilateral buttock abscess with seton placement, and Flex sig with biopsy on 10/10/23.  Pathology of ulcers and wounds showed benign mucosa with acute and chronic inflammation. Pathology of rectosigmoid, proximal and distal rectum biopsies showed Colonic mucosa with focal active colitis, mild architecture distortion and occasional noncaseating granuloma.  Blood test for ASCA IgA/IgG Antibodies was found to be positive with Ab levels of around 50. Anti Neutrophil Cytoplasmic Antibody was negative.  She reports that since the last I&D the abscesses in the alma rosa-vaginal area have improved with very little drainage. She also continues to have a small fistulous connection just proximal to the ostomy which also has largely stopped draining. She reports feeling well, working full time. She denies a significant family history of IBD or other autoimmune problems. She reports semi-solid stool output in the ostomy output. Denies blood in the stool.  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 Adequate opacification of the left colon with contrast. Extensive inflammatory changes in the left lower quadrant small bowel mesentery.  There is a fistula between distal descending colon and sigmoid colon (3:91-96). Mildly dilated and thickened small bowel loop in the vicinity, grossly similar to . Appendix is normal. Sleeve gastrectomy.  Endoscopic history:  2023 - diverticulosis in sigmoid colon, diffuse mild inflammation was found in the sigmoid colon and in the descending colon secondary to diversion colitis   Pathology 2023 -  1.  Colon, at 50 cm; biopsy: -   Colonic mucosa with acute and chronic inflammation.  See note.  2.  Colon, at 48 cm; biopsy: -   Colonic mucosa with acute and chronic inflammation.  3.  Colon, at 45 cm; biopsy: -   Colonic mucosa with acute and chronic inflammation.  Note: Sections of the 3 biopsies show colonic mucosa with increased lymphoplasmacytic infiltrate in the lamina propria, neutrophilic cryptitis, neutrophils in the lamina propria, and crypt abscesses. In addition, parts 1 and 3 show well-formed granulomas.  There is no evidence of dysplasia.   2022 - diffuse mild inflammation was found in the rectum, rectosigmoid colon and in the sigmoid colon and descending colon secondary to diversion colitis, one 6mm polyp in descending colon, localized mild inflammation was found in the transverse colon

## 2023-12-22 NOTE — REVIEW OF SYSTEMS
[Negative] : Heme/Lymph [Fecal Incontinence (soiling)] : fecal incontinence [Abdominal Pain] : no abdominal pain [Vomiting] : no vomiting [Constipation] : no constipation [Diarrhea] : no diarrhea [Heartburn] : no heartburn [Melena (black stool)] : no melena [Swollen Glands] : no swollen glands

## 2023-12-22 NOTE — CONSULT LETTER
[Dear  ___] : Dear  [unfilled], [Consult Letter:] : I had the pleasure of evaluating your patient, [unfilled]. [Please see my note below.] : Please see my note below. [Consult Closing:] : Thank you very much for allowing me to participate in the care of this patient.  If you have any questions, please do not hesitate to contact me. [Sincerely,] : Sincerely, [FreeTextEntry3] : Serge Shepherd MD Professor of Medicine Chief of GI Director IBD Program [facility]

## 2023-12-22 NOTE — ASSESSMENT
[FreeTextEntry1] : 39 yo F with PMHx of GERD, Gastric sleeve and large left ovarian abscess (2019) s/p drainage and cyst removal (benign pathology). After the GYN surgery she developed a colocutaneous fistula with concern for colovesicular fistula for which she had a diverting transverse colostomy (11/2019).  At attempt at rectal contrast imaging resulted in ? dehiscence of stump and and pelvic contrast leaks through ostomy site.  After discussing her case at IBD conference, consensus that this is likely Crohn's disease was the initial process that drove the other pathology; and we have decided with Dr. Saeed to move forward with IBD therapy.   On colonoscopy performed in 6/2023 there was diffuse mild inflammation was found in the sigmoid colon and in the descending colon secondary to diversion colitis. Pathology revealed increased lymphoplasmacytic infiltrate in the lamina propria, neutrophilic cryptitis, neutrophils in the lamina propria, and crypt abscesses. In addition ASCA antibodies are positive.   Patient was referred by Dr. Saeed for evaluation for possible Crohns disease with history of colocutaneous fistula and colovesicular fistula and recently developed alma rosa-stomal and alma rosa-vaginal fistulas for which she had numerous I&Ds.     # Fistulizing Crohns disease  - personal review of outside records. - negative Hepatitis B serologies and Quantiferon  - counseled on smoking cessation and how critical it is to maximize a good outcome form treatment - plan for IFX 10mg/kg given the severity of her symptoms and disease  - discussed Infliximab with the patient and instructed her to read more about these therapies prior to the next visit. A detailed discussion of the risks and benefits of biologic therapy occurred. We detailed increased risk of infections (bacterial, fungal, viral) which we will mitigate by immunizing in appropriate fashion (HBV,zoster, flu, pneumovax); risk of malignancy with time spent on lymphoma (HSTCL in young adults) and skin cancers (need for derm annual eval) ; risk of liver injury, bone marrow suppression, CNS disorders, allergic and infusion (if IV admin) reactions, idiosyncratic skin reactions, joint problems, among other rare and unusual manifestations.  We discussed the need to notify if fevers or major illness prior to infusions, and the need to maintain follow up and obtain request labs and evaluations.  In addition, we have already discusses resources such as [facility] and the manufacturers "patients" page to obtain additional detailed information on the medication.  F/U after 2 loading doses

## 2024-01-02 ENCOUNTER — APPOINTMENT (OUTPATIENT)
Dept: COLORECTAL SURGERY | Facility: CLINIC | Age: 39
End: 2024-01-02

## 2024-01-03 LAB
ANNOTATION COMMENT IMP: NORMAL
HLA-DQ2: NEGATIVE
HLA-DQ8 QL: NEGATIVE
REF LAB TEST METHOD: NORMAL

## 2024-01-10 ENCOUNTER — OUTPATIENT (OUTPATIENT)
Dept: OUTPATIENT SERVICES | Facility: HOSPITAL | Age: 39
LOS: 1 days | End: 2024-01-10
Payer: MEDICAID

## 2024-01-10 ENCOUNTER — APPOINTMENT (OUTPATIENT)
Dept: INFUSION THERAPY | Facility: CLINIC | Age: 39
End: 2024-01-10

## 2024-01-10 VITALS
HEART RATE: 80 BPM | SYSTOLIC BLOOD PRESSURE: 127 MMHG | OXYGEN SATURATION: 99 % | HEIGHT: 63 IN | TEMPERATURE: 98 F | DIASTOLIC BLOOD PRESSURE: 73 MMHG | WEIGHT: 160.06 LBS | RESPIRATION RATE: 18 BRPM

## 2024-01-10 DIAGNOSIS — K50.10 CROHN'S DISEASE OF LARGE INTESTINE WITHOUT COMPLICATIONS: ICD-10-CM

## 2024-01-10 DIAGNOSIS — Z98.890 OTHER SPECIFIED POSTPROCEDURAL STATES: Chronic | ICD-10-CM

## 2024-01-10 DIAGNOSIS — Z90.3 ACQUIRED ABSENCE OF STOMACH [PART OF]: Chronic | ICD-10-CM

## 2024-01-10 LAB
ALBUMIN SERPL ELPH-MCNC: 3.6 G/DL — SIGNIFICANT CHANGE UP (ref 3.3–5)
ALBUMIN SERPL ELPH-MCNC: 3.6 G/DL — SIGNIFICANT CHANGE UP (ref 3.3–5)
ALP SERPL-CCNC: 93 U/L — SIGNIFICANT CHANGE UP (ref 40–120)
ALP SERPL-CCNC: 93 U/L — SIGNIFICANT CHANGE UP (ref 40–120)
ALT FLD-CCNC: 7 U/L — LOW (ref 10–45)
ALT FLD-CCNC: 7 U/L — LOW (ref 10–45)
ANION GAP SERPL CALC-SCNC: 7 MMOL/L — SIGNIFICANT CHANGE UP (ref 5–17)
ANION GAP SERPL CALC-SCNC: 7 MMOL/L — SIGNIFICANT CHANGE UP (ref 5–17)
AST SERPL-CCNC: 12 U/L — SIGNIFICANT CHANGE UP (ref 10–40)
AST SERPL-CCNC: 12 U/L — SIGNIFICANT CHANGE UP (ref 10–40)
BILIRUB SERPL-MCNC: 0.2 MG/DL — SIGNIFICANT CHANGE UP (ref 0.2–1.2)
BILIRUB SERPL-MCNC: 0.2 MG/DL — SIGNIFICANT CHANGE UP (ref 0.2–1.2)
BUN SERPL-MCNC: 7 MG/DL — SIGNIFICANT CHANGE UP (ref 7–23)
BUN SERPL-MCNC: 7 MG/DL — SIGNIFICANT CHANGE UP (ref 7–23)
CALCIUM SERPL-MCNC: 8.8 MG/DL — SIGNIFICANT CHANGE UP (ref 8.4–10.5)
CALCIUM SERPL-MCNC: 8.8 MG/DL — SIGNIFICANT CHANGE UP (ref 8.4–10.5)
CHLORIDE SERPL-SCNC: 101 MMOL/L — SIGNIFICANT CHANGE UP (ref 96–108)
CHLORIDE SERPL-SCNC: 101 MMOL/L — SIGNIFICANT CHANGE UP (ref 96–108)
CO2 SERPL-SCNC: 29 MMOL/L — SIGNIFICANT CHANGE UP (ref 22–31)
CO2 SERPL-SCNC: 29 MMOL/L — SIGNIFICANT CHANGE UP (ref 22–31)
CREAT SERPL-MCNC: 0.57 MG/DL — SIGNIFICANT CHANGE UP (ref 0.5–1.3)
CREAT SERPL-MCNC: 0.57 MG/DL — SIGNIFICANT CHANGE UP (ref 0.5–1.3)
CRP SERPL-MCNC: 10.8 MG/L — HIGH (ref 0–4)
CRP SERPL-MCNC: 10.8 MG/L — HIGH (ref 0–4)
EGFR: 120 ML/MIN/1.73M2 — SIGNIFICANT CHANGE UP
EGFR: 120 ML/MIN/1.73M2 — SIGNIFICANT CHANGE UP
GLUCOSE SERPL-MCNC: 79 MG/DL — SIGNIFICANT CHANGE UP (ref 70–99)
GLUCOSE SERPL-MCNC: 79 MG/DL — SIGNIFICANT CHANGE UP (ref 70–99)
HCT VFR BLD CALC: 35.7 % — SIGNIFICANT CHANGE UP (ref 34.5–45)
HCT VFR BLD CALC: 35.7 % — SIGNIFICANT CHANGE UP (ref 34.5–45)
HGB BLD-MCNC: 10.6 G/DL — LOW (ref 11.5–15.5)
HGB BLD-MCNC: 10.6 G/DL — LOW (ref 11.5–15.5)
MCHC RBC-ENTMCNC: 21.4 PG — LOW (ref 27–34)
MCHC RBC-ENTMCNC: 21.4 PG — LOW (ref 27–34)
MCHC RBC-ENTMCNC: 29.7 GM/DL — LOW (ref 32–36)
MCHC RBC-ENTMCNC: 29.7 GM/DL — LOW (ref 32–36)
MCV RBC AUTO: 72.1 FL — LOW (ref 80–100)
MCV RBC AUTO: 72.1 FL — LOW (ref 80–100)
NRBC # BLD: 0 /100 WBCS — SIGNIFICANT CHANGE UP (ref 0–0)
NRBC # BLD: 0 /100 WBCS — SIGNIFICANT CHANGE UP (ref 0–0)
PLATELET # BLD AUTO: 293 K/UL — SIGNIFICANT CHANGE UP (ref 150–400)
PLATELET # BLD AUTO: 293 K/UL — SIGNIFICANT CHANGE UP (ref 150–400)
POTASSIUM SERPL-MCNC: 4.1 MMOL/L — SIGNIFICANT CHANGE UP (ref 3.5–5.3)
POTASSIUM SERPL-MCNC: 4.1 MMOL/L — SIGNIFICANT CHANGE UP (ref 3.5–5.3)
POTASSIUM SERPL-SCNC: 4.1 MMOL/L — SIGNIFICANT CHANGE UP (ref 3.5–5.3)
POTASSIUM SERPL-SCNC: 4.1 MMOL/L — SIGNIFICANT CHANGE UP (ref 3.5–5.3)
PROT SERPL-MCNC: 7.5 G/DL — SIGNIFICANT CHANGE UP (ref 6–8.3)
PROT SERPL-MCNC: 7.5 G/DL — SIGNIFICANT CHANGE UP (ref 6–8.3)
RBC # BLD: 4.95 M/UL — SIGNIFICANT CHANGE UP (ref 3.8–5.2)
RBC # BLD: 4.95 M/UL — SIGNIFICANT CHANGE UP (ref 3.8–5.2)
RBC # FLD: 15.7 % — HIGH (ref 10.3–14.5)
RBC # FLD: 15.7 % — HIGH (ref 10.3–14.5)
SODIUM SERPL-SCNC: 137 MMOL/L — SIGNIFICANT CHANGE UP (ref 135–145)
SODIUM SERPL-SCNC: 137 MMOL/L — SIGNIFICANT CHANGE UP (ref 135–145)
WBC # BLD: 10.88 K/UL — HIGH (ref 3.8–10.5)
WBC # BLD: 10.88 K/UL — HIGH (ref 3.8–10.5)
WBC # FLD AUTO: 10.88 K/UL — HIGH (ref 3.8–10.5)
WBC # FLD AUTO: 10.88 K/UL — HIGH (ref 3.8–10.5)

## 2024-01-10 PROCEDURE — 96415 CHEMO IV INFUSION ADDL HR: CPT

## 2024-01-10 PROCEDURE — 96413 CHEMO IV INFUSION 1 HR: CPT

## 2024-01-10 PROCEDURE — 86140 C-REACTIVE PROTEIN: CPT

## 2024-01-10 PROCEDURE — 85027 COMPLETE CBC AUTOMATED: CPT

## 2024-01-10 PROCEDURE — 80053 COMPREHEN METABOLIC PANEL: CPT

## 2024-01-10 PROCEDURE — 36415 COLL VENOUS BLD VENIPUNCTURE: CPT

## 2024-01-10 RX ORDER — INFLIXIMAB-DYYB 120 MG/ML
700 INJECTION SUBCUTANEOUS ONCE
Refills: 0 | Status: COMPLETED | OUTPATIENT
Start: 2024-01-10 | End: 2024-01-10

## 2024-01-10 RX ADMIN — INFLIXIMAB-DYYB 125 MILLIGRAM(S): 120 INJECTION SUBCUTANEOUS at 16:48

## 2024-01-10 RX ADMIN — INFLIXIMAB-DYYB 700 MILLIGRAM(S): 120 INJECTION SUBCUTANEOUS at 18:05

## 2024-01-12 ENCOUNTER — APPOINTMENT (OUTPATIENT)
Dept: COLORECTAL SURGERY | Facility: CLINIC | Age: 39
End: 2024-01-12
Payer: MEDICAID

## 2024-01-12 VITALS
SYSTOLIC BLOOD PRESSURE: 130 MMHG | DIASTOLIC BLOOD PRESSURE: 82 MMHG | BODY MASS INDEX: 28.7 KG/M2 | OXYGEN SATURATION: 99 % | HEART RATE: 71 BPM | RESPIRATION RATE: 16 BRPM | TEMPERATURE: 98.1 F | HEIGHT: 63 IN | WEIGHT: 162 LBS

## 2024-01-12 DIAGNOSIS — L92.9 GRANULOMATOUS DISORDER OF THE SKIN AND SUBCUTANEOUS TISSUE, UNSPECIFIED: ICD-10-CM

## 2024-01-12 DIAGNOSIS — B36.9 SUPERFICIAL MYCOSIS, UNSPECIFIED: ICD-10-CM

## 2024-01-12 PROCEDURE — 99213 OFFICE O/P EST LOW 20 MIN: CPT | Mod: 25

## 2024-01-12 PROCEDURE — 17250 CHEM CAUT OF GRANLTJ TISSUE: CPT

## 2024-01-12 NOTE — PHYSICAL EXAM
[Abdomen Masses] : No abdominal masses [Abdomen Tenderness] : ~T No ~M abdominal tenderness [None] : no anal fissures seen [Stool Sample Taken] : no stool obtained on rectal exam [de-identified] : multiple healing I & D sites; no induration, no  erythema, discharge. Open wounds are clean, smaller wound are almost closed.  Skin is very moist and there is a strong fungal odor noted, no white plaques, no discharge' [de-identified] : not examined [de-identified] : not done [de-identified] : not done [de-identified] : Alert, oriented and ambulatory; looks well.  [FreeTextEntry1] : Left  upper quadrant stoma is pink, well formed.  There are small areas of hypergranulation tissue distally. there is and area of darkened skin with some open areas with swiss cheese appearance which flaps over what was an open wound proximal and lateral to the stoma.  It looks like the skin has healed beneath this flap. There is no discharge, the edges of skin are rolled, ther open areas have  smooth edges and no raw area and no bleeding.

## 2024-01-12 NOTE — ASSESSMENT
[FreeTextEntry1] : Probable fungal rash in perineal area: to continue sitz baths, dry wit hair dryer and treat with nystatin cream.  Hyper granulation tissue distal to stoma was treated silver nitrate.   The peristomal wound was left as is.  Discussed with patient that it appears that the wound has healed beneath the overlying skin and it may ultimately need to be debrided.   To continue protecting it as she has been with a piece of gauze under the stoma appliance.   To follow up in one month, sooner if any problems.

## 2024-01-12 NOTE — HISTORY OF PRESENT ILLNESS
[FreeTextEntry1] : 38 year old female here for follow up related to perianal fistulas and peristomal tunnelling.  Had first Remicade infusion on January 10,2023 and is convinces that there is already less drainage from the perianal wounds. The does complain of itching in the perineal area.   She additionally states that the peristomal wound is also draining less.   Currently using a one piece cut to fit colostomy appliance and places a peice of guaze over the peristomal wound.  Changes once a day.

## 2024-01-24 ENCOUNTER — OUTPATIENT (OUTPATIENT)
Dept: OUTPATIENT SERVICES | Facility: HOSPITAL | Age: 39
LOS: 1 days | End: 2024-01-24
Payer: MEDICAID

## 2024-01-24 ENCOUNTER — APPOINTMENT (OUTPATIENT)
Dept: INFUSION THERAPY | Facility: CLINIC | Age: 39
End: 2024-01-24

## 2024-01-24 VITALS
WEIGHT: 166.01 LBS | OXYGEN SATURATION: 96 % | TEMPERATURE: 98 F | HEIGHT: 63 IN | DIASTOLIC BLOOD PRESSURE: 62 MMHG | RESPIRATION RATE: 16 BRPM | SYSTOLIC BLOOD PRESSURE: 112 MMHG | HEART RATE: 80 BPM

## 2024-01-24 VITALS
RESPIRATION RATE: 16 BRPM | HEART RATE: 78 BPM | OXYGEN SATURATION: 97 % | DIASTOLIC BLOOD PRESSURE: 71 MMHG | SYSTOLIC BLOOD PRESSURE: 125 MMHG | TEMPERATURE: 97 F

## 2024-01-24 DIAGNOSIS — Z98.890 OTHER SPECIFIED POSTPROCEDURAL STATES: Chronic | ICD-10-CM

## 2024-01-24 DIAGNOSIS — Z90.3 ACQUIRED ABSENCE OF STOMACH [PART OF]: Chronic | ICD-10-CM

## 2024-01-24 DIAGNOSIS — K50.10 CROHN'S DISEASE OF LARGE INTESTINE WITHOUT COMPLICATIONS: ICD-10-CM

## 2024-01-24 LAB
ALBUMIN SERPL ELPH-MCNC: 3.8 G/DL — SIGNIFICANT CHANGE UP (ref 3.3–5)
ALP SERPL-CCNC: 82 U/L — SIGNIFICANT CHANGE UP (ref 40–120)
ALT FLD-CCNC: 8 U/L — LOW (ref 10–45)
ANION GAP SERPL CALC-SCNC: 7 MMOL/L — SIGNIFICANT CHANGE UP (ref 5–17)
AST SERPL-CCNC: 13 U/L — SIGNIFICANT CHANGE UP (ref 10–40)
BILIRUB SERPL-MCNC: 0.3 MG/DL — SIGNIFICANT CHANGE UP (ref 0.2–1.2)
BUN SERPL-MCNC: 10 MG/DL — SIGNIFICANT CHANGE UP (ref 7–23)
CALCIUM SERPL-MCNC: 8.6 MG/DL — SIGNIFICANT CHANGE UP (ref 8.4–10.5)
CHLORIDE SERPL-SCNC: 103 MMOL/L — SIGNIFICANT CHANGE UP (ref 96–108)
CO2 SERPL-SCNC: 28 MMOL/L — SIGNIFICANT CHANGE UP (ref 22–31)
CREAT SERPL-MCNC: 0.64 MG/DL — SIGNIFICANT CHANGE UP (ref 0.5–1.3)
CRP SERPL-MCNC: <3 MG/L — SIGNIFICANT CHANGE UP (ref 0–4)
EGFR: 117 ML/MIN/1.73M2 — SIGNIFICANT CHANGE UP
GLUCOSE SERPL-MCNC: 92 MG/DL — SIGNIFICANT CHANGE UP (ref 70–99)
HCT VFR BLD CALC: 33.3 % — LOW (ref 34.5–45)
HGB BLD-MCNC: 9.9 G/DL — LOW (ref 11.5–15.5)
MCHC RBC-ENTMCNC: 21.3 PG — LOW (ref 27–34)
MCHC RBC-ENTMCNC: 29.7 GM/DL — LOW (ref 32–36)
MCV RBC AUTO: 71.8 FL — LOW (ref 80–100)
NRBC # BLD: 0 /100 WBCS — SIGNIFICANT CHANGE UP (ref 0–0)
PLATELET # BLD AUTO: 287 K/UL — SIGNIFICANT CHANGE UP (ref 150–400)
POTASSIUM SERPL-MCNC: 4.2 MMOL/L — SIGNIFICANT CHANGE UP (ref 3.5–5.3)
POTASSIUM SERPL-SCNC: 4.2 MMOL/L — SIGNIFICANT CHANGE UP (ref 3.5–5.3)
PROT SERPL-MCNC: 7.3 G/DL — SIGNIFICANT CHANGE UP (ref 6–8.3)
RBC # BLD: 4.64 M/UL — SIGNIFICANT CHANGE UP (ref 3.8–5.2)
RBC # FLD: 15.9 % — HIGH (ref 10.3–14.5)
SODIUM SERPL-SCNC: 138 MMOL/L — SIGNIFICANT CHANGE UP (ref 135–145)
WBC # BLD: 8.76 K/UL — SIGNIFICANT CHANGE UP (ref 3.8–10.5)
WBC # FLD AUTO: 8.76 K/UL — SIGNIFICANT CHANGE UP (ref 3.8–10.5)

## 2024-01-24 PROCEDURE — 96415 CHEMO IV INFUSION ADDL HR: CPT

## 2024-01-24 PROCEDURE — 86140 C-REACTIVE PROTEIN: CPT

## 2024-01-24 PROCEDURE — 80053 COMPREHEN METABOLIC PANEL: CPT

## 2024-01-24 PROCEDURE — 96413 CHEMO IV INFUSION 1 HR: CPT

## 2024-01-24 PROCEDURE — 85027 COMPLETE CBC AUTOMATED: CPT

## 2024-01-24 PROCEDURE — 36415 COLL VENOUS BLD VENIPUNCTURE: CPT

## 2024-01-24 RX ORDER — INFLIXIMAB-DYYB 120 MG/ML
700 INJECTION SUBCUTANEOUS ONCE
Refills: 0 | Status: COMPLETED | OUTPATIENT
Start: 2024-01-24 | End: 2024-01-24

## 2024-01-24 RX ADMIN — INFLIXIMAB-DYYB 125 MILLIGRAM(S): 120 INJECTION SUBCUTANEOUS at 13:26

## 2024-01-24 RX ADMIN — INFLIXIMAB-DYYB 700 MILLIGRAM(S): 120 INJECTION SUBCUTANEOUS at 15:10

## 2024-01-24 NOTE — DISCHARGE INSTRUCTIONS: GENERAL THERAPY - DC SYMPTOM 1
Chief complaint:  Right inguinal lymphadenopathy.      Patient presents with   • Pre-Op Exam     Procedure:  Excisional Biopsy of Right Inguinal Lymph Node under general anesthesia.  Date:  03/23/2021.  Provider:  Dr Drake Connors.  [facility] and surgical clearance at Dr Connors's written request.     Pt seen with video .      HISTORY OF PRESENT ILLNESS     HPI    Other significant problems:  Patient Active Problem List    Diagnosis Date Noted   • Prediabetes 10/21/2019     Priority: Low   • Vitamin D deficiency 09/24/2018     Priority: Low   • Dermatitis 08/27/2018     Priority: Low   • Non-seasonal allergic rhinitis 02/26/2018     Priority: Low   • Family history of colon cancer 02/26/2018     Priority: Low     Neg colonoscopy 8/2018, [facility]         PAST MEDICAL, FAMILY AND SOCIAL HISTORY     Health Maintenance Due   Topic Date Due   • Pneumococcal Vaccine 0-64 (1 of 1 - PPSV23) Never done   • Shingles Vaccine (1 of 2) Never done   • Depression Screening  06/08/2021       Patient is due for shingles and last twinrix.  Deferred to next CPE.          Medications:  Current Outpatient Medications   Medication   • Ascorbic Acid (Vitamin C) 500 MG Cap   • beclomethasone (BECONASE-AQ) 42 MCG/SPRAY nasal spray   • fluticasone (Flonase) 50 MCG/ACT nasal spray   • ketoconazole (NIZORAL) 2 % cream   • mometasone (ELOCON) 0.1 % cream   • cetirizine (ZYRTEC) 10 MG tablet   • Cholecalciferol (VITAMIN D) 2000 units capsule   • triamcinolone (ARISTOCORT) 0.1 % cream   • EPINEPHrine 0.3 MG/0.3ML auto-injector   • hydrOXYzine (ATARAX) 25 MG tablet   • Omega-3 Fatty Acids (FISH OIL) 1200 MG capsule   • ipratropium (ATROVENT) 0.03 % nasal spray     No current facility-administered medications for this visit.        Allergies:  ALLERGIES:  No Known Allergies    Past Medical  History/Surgeries:  History reviewed. No pertinent past medical history.    Past Surgical History:   Procedure  Laterality Date   • Colonoscopy  2018    negative (+ family hx)[facility]   • Sinus surgery  2019    [facility]       Family History:  Family History   Problem Relation Age of Onset   • Cancer, Colon Mother    • Cancer, Breast Sister        Social History:  Social History     Tobacco Use   • Smoking status: Current Every Day Smoker     Packs/day: 0.25     Years: 30.00     Pack years: 7.50     Types: Cigarettes     Last attempt to quit: 2015     Years since quittin.0   • Smokeless tobacco: Never Used   • Tobacco comment: occasionally   Substance Use Topics   • Alcohol use: Not Currently     Alcohol/week: 1.0 standard drinks     Types: 1 Glasses of wine per week     Comment: occasionally.        REVIEW OF SYSTEMS     Review of Systems   Constitutional: Negative for appetite change and fatigue.   HENT: Negative for congestion, sinus pressure and sinus pain.    Eyes: Negative for discharge and itching.   Respiratory: Negative for cough and shortness of breath.    Cardiovascular: Negative for chest pain and palpitations.   Gastrointestinal: Negative for abdominal pain, constipation and diarrhea.   Endocrine: Negative for cold intolerance and heat intolerance.   Genitourinary: Negative for difficulty urinating, dysuria and urgency.   Musculoskeletal: Negative for arthralgias and back pain.   Skin: Positive for rash (chronic no concerns.). Negative for wound.   Allergic/Immunologic: Negative for environmental allergies and food allergies.   Neurological: Negative for dizziness, light-headedness and headaches.   Hematological: Does not bruise/bleed easily.   Psychiatric/Behavioral: Negative for dysphoric mood. The patient is not nervous/anxious.        PHYSICAL EXAM   Blood pressure 92/68, pulse 69, height 5' 4\" (1.626 m), weight 60.4 kg, SpO2 98 %.    Physical Exam  Constitutional:       Appearance: Normal appearance. He is well-developed.   HENT:      Head: Normocephalic and atraumatic.      Right Ear:  Tympanic membrane, ear canal and external ear normal.      Left Ear: Tympanic membrane, ear canal and external ear normal.      Nose: Nose normal.      Mouth/Throat:      Dentition: Normal dentition.      Pharynx: No posterior oropharyngeal erythema.   Eyes:      General: Lids are normal.      Conjunctiva/sclera: Conjunctivae normal.      Right eye: Right conjunctiva is not injected.      Left eye: Left conjunctiva is not injected.      Pupils: Pupils are equal, round, and reactive to light.   Neck:      Musculoskeletal: Normal range of motion.      Thyroid: No thyroid mass or thyromegaly.      Vascular: No carotid bruit or JVD.      Trachea: Trachea normal.   Cardiovascular:      Rate and Rhythm: Normal rate and regular rhythm.      Heart sounds: Normal heart sounds, S1 normal and S2 normal. No murmur.   Pulmonary:      Effort: Pulmonary effort is normal. No respiratory distress.      Breath sounds: Normal breath sounds. No wheezing or rales.   Abdominal:      General: Bowel sounds are normal.      Palpations: Abdomen is soft. There is no mass.      Tenderness: There is no abdominal tenderness.      Hernia: No hernia is present.   Musculoskeletal:      Comments: BL LE nontender, no edema.   Lymphadenopathy:      Cervical: No cervical adenopathy.   Skin:     General: Skin is warm and dry.      Findings: Rash (chronic atopic dermatitis) present. No abrasion.   Neurological:      Mental Status: He is alert and oriented to person, place, and time.      Cranial Nerves: No cranial nerve deficit.   Psychiatric:         Mood and Affect: Mood is not anxious or depressed.         Speech: Speech normal.         Behavior: Behavior normal.         Thought Content: Thought content normal.         Judgment: Judgment normal.        EKG NSR rate 62    ASSESSMENT/PLAN     1. Inguinal lymphadenopathy    2. Preop examination    Mitzi is cleared for surgery.      Return in about 14 weeks (around 6/21/2021), or if symptoms worsen or  fail to improve, for has appt.    MD Shantelle Wooten        Fever of 100.5F or above

## 2024-01-24 NOTE — DISCHARGE INSTRUCTIONS: GENERAL THERAPY - I ACKNOWLEDGE THAT I HAVE RECEIVED AND UNDERSTAND THE ABOVE INSTRUCTIONS AND AGREE TO BEING DISCHARGED TODAY
Monitor: The problem is stable.  Evaluation: Labs/tests ordered, see encounter summary.  Assessment/Treatment:  Continue current treatment/monitoring regimen. and Follow up in 3 months   Statement Selected

## 2024-02-20 ENCOUNTER — APPOINTMENT (OUTPATIENT)
Dept: GASTROENTEROLOGY | Facility: CLINIC | Age: 39
End: 2024-02-20
Payer: MEDICAID

## 2024-02-20 DIAGNOSIS — Z93.3 COLOSTOMY STATUS: ICD-10-CM

## 2024-02-20 DIAGNOSIS — L02.31 CUTANEOUS ABSCESS OF BUTTOCK: ICD-10-CM

## 2024-02-20 DIAGNOSIS — K63.2 FISTULA OF INTESTINE: ICD-10-CM

## 2024-02-20 DIAGNOSIS — E61.1 IRON DEFICIENCY: ICD-10-CM

## 2024-02-20 PROCEDURE — G2211 COMPLEX E/M VISIT ADD ON: CPT | Mod: NC,1L,95

## 2024-02-20 PROCEDURE — 99214 OFFICE O/P EST MOD 30 MIN: CPT

## 2024-02-20 NOTE — HISTORY OF PRESENT ILLNESS
[Home] : at home, [unfilled] , at the time of the visit. [Medical Office: ([facility])___] : at the medical office located in  [Verbal consent obtained from patient] : the patient, [unfilled] [FreeTextEntry1] : 39 yo F with PMH of GERD, Gastric sleeve and large left ovarian abscess () s/p drainage and cyst removal (benign pathology). 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 She was taken to OR on 23 for fistula take down and possible colostomy take down. A connection to the colon was not found and thus a colon resection was not done. She only had ex lap and lysis of adhesions on 2023. She presents today in person to discuss a plan for treatment as we have discussed her case at IBD conference and are confident after reviewing records with interdisciplinary team, she has severe fistulizing Crohn's disease. Now s/p 2 loading doses of IFX 10mg/kg with clinical response!  Pt reports she's had minimal to no drainage from her perianal fistulas, and the wounds around her ostomy. No rectal pain - overall feeling much better. Does have fatigue daily - of note, hgb remains 9.9, however CRP completely normalized. Some restless leg after first and second infusion, but otherwise short lived and feeling well.   HPI:  She was planned for colostomy closure in July. However, on x-ray barium enema on 23 there was a spiculated appearance of the distal descending colon and sigmoid colon. A subtle blind ending tract cannot be ruled out involving the proximal sigmoid. Correlation with f/u CTAP with retrograde injection of water-soluble contrast via the rectum was recommended. CTAP was done on 23. It read, Extensive inflammatory changes in the left lower quadrant small bowel mesentery. There is a fistula between distal descending colon and sigmoid colon. Mildly dilated and thickened small bowel loop in the vicinity, grossly similar to .  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 On 8/15/23 she was seen for some drainage from perianal area. On exam she was found to have multiple bilateral subcutaneous abscesses not involving the anus and had an I&D done by Dr. Saeed in office. On 10/2/23, she developed new posterior long vertical midline ulceration. EUA planned. She is now s/p EUA, biopsy of perianal, I&D of bilateral buttock abscess with seton placement, and Flex sig with biopsy on 10/10/23.  Pathology of ulcers and wounds showed benign mucosa with acute and chronic inflammation. Pathology of rectosigmoid, proximal and distal rectum biopsies showed Colonic mucosa with focal active colitis, mild architecture distortion and occasional noncaseating granuloma.  Blood test for ASCA IgA/IgG Antibodies was found to be positive with Ab levels of around 50. Anti Neutrophil Cytoplasmic Antibody was negative.  She reports that since the last I&D the abscesses in the alma rosa-vaginal area have improved with very little drainage. She also continues to have a small fistulous connection just proximal to the ostomy which also has largely stopped draining. She reports feeling well, working full time. She denies a significant family history of IBD or other autoimmune problems. She reports semi-solid stool output in the ostomy output. Denies blood in the stool.  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 Adequate opacification of the left colon with contrast. Extensive inflammatory changes in the left lower quadrant small bowel mesentery.  There is a fistula between distal descending colon and sigmoid colon (3:91-96). Mildly dilated and thickened small bowel loop in the vicinity, grossly similar to . Appendix is normal. Sleeve gastrectomy.  Endoscopic history:  2023 - diverticulosis in sigmoid colon, diffuse mild inflammation was found in the sigmoid colon and in the descending colon secondary to diversion colitis   Pathology 2023 -  1.  Colon, at 50 cm; biopsy: -   Colonic mucosa with acute and chronic inflammation.  See note.  2.  Colon, at 48 cm; biopsy: -   Colonic mucosa with acute and chronic inflammation.  3.  Colon, at 45 cm; biopsy: -   Colonic mucosa with acute and chronic inflammation.  Note: Sections of the 3 biopsies show colonic mucosa with increased lymphoplasmacytic infiltrate in the lamina propria, neutrophilic cryptitis, neutrophils in the lamina propria, and crypt abscesses. In addition, parts 1 and 3 show well-formed granulomas.  There is no evidence of dysplasia.   2022 - diffuse mild inflammation was found in the rectum, rectosigmoid colon and in the sigmoid colon and descending colon secondary to diversion colitis, one 6mm polyp in descending colon, localized mild inflammation was found in the transverse colon

## 2024-02-20 NOTE — CONSULT LETTER
[Dear  ___] : Dear  [unfilled], [Consult Letter:] : I had the pleasure of evaluating your patient, [unfilled]. [Please see my note below.] : Please see my note below. [Consult Closing:] : Thank you very much for allowing me to participate in the care of this patient.  If you have any questions, please do not hesitate to contact me. [Sincerely,] : Sincerely, [FreeTextEntry3] : Serge Shepherd MD Professor of Medicine Chief of GI Director IBD Program [facility]

## 2024-02-20 NOTE — REVIEW OF SYSTEMS
[Fecal Incontinence (soiling)] : fecal incontinence [Negative] : Heme/Lymph [Abdominal Pain] : no abdominal pain [Vomiting] : no vomiting [Constipation] : no constipation [Diarrhea] : no diarrhea [Heartburn] : no heartburn [Melena (black stool)] : no melena [Swollen Glands] : no swollen glands

## 2024-02-20 NOTE — REASON FOR VISIT
[Initial Evaluation] : an initial evaluation
Detail Level: Zone
Plan: Patient to use Clobetasol twice daily to the affected areas on the hand x1 week, then qd x1 week and then as needed. Patient educated on the importance of moisturizing several times daily. Patient to follow up in six weeks for re-evaluation

## 2024-02-20 NOTE — ASSESSMENT
[FreeTextEntry1] : 39 yo F with PMHx of GERD, Gastric sleeve and large left ovarian abscess (2019) s/p drainage and cyst removal (benign pathology). After the GYN surgery she developed a colocutaneous fistula with concern for colovesicular fistula for which she had a diverting transverse colostomy (11/2019).  At attempt at rectal contrast imaging resulted in ? dehiscence of stump and and pelvic contrast leaks through ostomy site.  After discussing her case at IBD conference, consensus that this is likely Crohn's disease was the initial process that drove the other pathology; and we have decided with Dr. Saeed to move forward with IBD therapy. Now s/p 2 loading doses of 10mg/kg IFX feeling much better with minimal to no perianal drainage from fistulas.   On colonoscopy performed in 6/2023 there was diffuse mild inflammation was found in the sigmoid colon and in the descending colon secondary to diversion colitis. Pathology revealed increased lymphoplasmacytic infiltrate in the lamina propria, neutrophilic cryptitis, neutrophils in the lamina propria, and crypt abscesses. In addition ASCA antibodies are positive.   Patient was referred by Dr. Seaed for evaluation for possible Crohns disease with history of colocutaneous fistula and colovesicular fistula and recently developed alma rosa-stomal and alma rosa-vaginal fistulas for which she had numerous I&Ds.    # Fistulizing Crohns disease  - personal review of outside records. - negative Hepatitis B serologies and Quantiferon  - counseled on smoking cessation and how critical it is to maximize a good outcome form treatment - cont IFX 10mg/kg given the severity of her symptoms and disease , due for third loading tomorrow then transition to q 8 weeks - check IFX antibody and trough level tomorrow  - discussed Infliximab with the patient and instructed her to read more about these therapies prior to the next visit. A detailed discussion of the risks and benefits of biologic therapy occurred. We detailed increased risk of infections (bacterial, fungal, viral) which we will mitigate by immunizing in appropriate fashion (HBV,zoster, flu, pneumovax); risk of malignancy with time spent on lymphoma (HSTCL in young adults) and skin cancers (need for derm annual eval) ; risk of liver injury, bone marrow suppression, CNS disorders, allergic and infusion (if IV admin) reactions, idiosyncratic skin reactions, joint problems, among other rare and unusual manifestations.  We discussed the need to notify if fevers or major illness prior to infusions, and the need to maintain follow up and obtain request labs and evaluations.  In addition, we have already discusses resources such as [facility] and the manufacturers "patients" page to obtain additional detailed information on the medication.  ARIANA - referral to Hematologist   F/U after 3rd loading dose

## 2024-02-21 ENCOUNTER — OUTPATIENT (OUTPATIENT)
Dept: OUTPATIENT SERVICES | Facility: HOSPITAL | Age: 39
LOS: 1 days | End: 2024-02-21
Payer: MEDICAID

## 2024-02-21 ENCOUNTER — APPOINTMENT (OUTPATIENT)
Dept: INFUSION THERAPY | Facility: CLINIC | Age: 39
End: 2024-02-21

## 2024-02-21 VITALS
OXYGEN SATURATION: 97 % | TEMPERATURE: 99 F | SYSTOLIC BLOOD PRESSURE: 143 MMHG | DIASTOLIC BLOOD PRESSURE: 83 MMHG | RESPIRATION RATE: 16 BRPM | HEART RATE: 90 BPM

## 2024-02-21 VITALS
HEIGHT: 63 IN | WEIGHT: 166.89 LBS | SYSTOLIC BLOOD PRESSURE: 143 MMHG | RESPIRATION RATE: 18 BRPM | HEART RATE: 100 BPM | TEMPERATURE: 98 F | DIASTOLIC BLOOD PRESSURE: 96 MMHG | OXYGEN SATURATION: 98 %

## 2024-02-21 DIAGNOSIS — Z98.890 OTHER SPECIFIED POSTPROCEDURAL STATES: Chronic | ICD-10-CM

## 2024-02-21 DIAGNOSIS — D45 POLYCYTHEMIA VERA: ICD-10-CM

## 2024-02-21 DIAGNOSIS — Z90.3 ACQUIRED ABSENCE OF STOMACH [PART OF]: Chronic | ICD-10-CM

## 2024-02-21 DIAGNOSIS — K50.10 CROHN'S DISEASE OF LARGE INTESTINE WITHOUT COMPLICATIONS: ICD-10-CM

## 2024-02-21 LAB
ALBUMIN SERPL ELPH-MCNC: 4.5 G/DL — SIGNIFICANT CHANGE UP (ref 3.3–5)
ALP SERPL-CCNC: 90 U/L — SIGNIFICANT CHANGE UP (ref 40–120)
ALT FLD-CCNC: 9 U/L — LOW (ref 10–45)
ANION GAP SERPL CALC-SCNC: 10 MMOL/L — SIGNIFICANT CHANGE UP (ref 5–17)
AST SERPL-CCNC: 15 U/L — SIGNIFICANT CHANGE UP (ref 10–40)
BILIRUB SERPL-MCNC: 0.5 MG/DL — SIGNIFICANT CHANGE UP (ref 0.2–1.2)
BUN SERPL-MCNC: 12 MG/DL — SIGNIFICANT CHANGE UP (ref 7–23)
CALCIUM SERPL-MCNC: 9.4 MG/DL — SIGNIFICANT CHANGE UP (ref 8.4–10.5)
CHLORIDE SERPL-SCNC: 99 MMOL/L — SIGNIFICANT CHANGE UP (ref 96–108)
CO2 SERPL-SCNC: 25 MMOL/L — SIGNIFICANT CHANGE UP (ref 22–31)
CREAT SERPL-MCNC: 0.58 MG/DL — SIGNIFICANT CHANGE UP (ref 0.5–1.3)
CRP SERPL-MCNC: <3 MG/L — SIGNIFICANT CHANGE UP (ref 0–4)
EGFR: 119 ML/MIN/1.73M2 — SIGNIFICANT CHANGE UP
GLUCOSE SERPL-MCNC: 91 MG/DL — SIGNIFICANT CHANGE UP (ref 70–99)
HCT VFR BLD CALC: 39.8 % — SIGNIFICANT CHANGE UP (ref 34.5–45)
HGB BLD-MCNC: 11.8 G/DL — SIGNIFICANT CHANGE UP (ref 11.5–15.5)
MCHC RBC-ENTMCNC: 21.3 PG — LOW (ref 27–34)
MCHC RBC-ENTMCNC: 29.6 GM/DL — LOW (ref 32–36)
MCV RBC AUTO: 71.7 FL — LOW (ref 80–100)
NRBC # BLD: 0 /100 WBCS — SIGNIFICANT CHANGE UP (ref 0–0)
PLATELET # BLD AUTO: 264 K/UL — SIGNIFICANT CHANGE UP (ref 150–400)
POTASSIUM SERPL-MCNC: 3.8 MMOL/L — SIGNIFICANT CHANGE UP (ref 3.5–5.3)
POTASSIUM SERPL-SCNC: 3.8 MMOL/L — SIGNIFICANT CHANGE UP (ref 3.5–5.3)
PROT SERPL-MCNC: 8.7 G/DL — HIGH (ref 6–8.3)
RBC # BLD: 5.55 M/UL — HIGH (ref 3.8–5.2)
RBC # FLD: 15.9 % — HIGH (ref 10.3–14.5)
SODIUM SERPL-SCNC: 134 MMOL/L — LOW (ref 135–145)
WBC # BLD: 9.47 K/UL — SIGNIFICANT CHANGE UP (ref 3.8–10.5)
WBC # FLD AUTO: 9.47 K/UL — SIGNIFICANT CHANGE UP (ref 3.8–10.5)

## 2024-02-21 PROCEDURE — 36415 COLL VENOUS BLD VENIPUNCTURE: CPT

## 2024-02-21 PROCEDURE — 96413 CHEMO IV INFUSION 1 HR: CPT

## 2024-02-21 PROCEDURE — 86140 C-REACTIVE PROTEIN: CPT

## 2024-02-21 PROCEDURE — 85027 COMPLETE CBC AUTOMATED: CPT

## 2024-02-21 PROCEDURE — 80053 COMPREHEN METABOLIC PANEL: CPT

## 2024-02-21 RX ORDER — INFLIXIMAB-DYYB 120 MG/ML
700 INJECTION SUBCUTANEOUS ONCE
Refills: 0 | Status: COMPLETED | OUTPATIENT
Start: 2024-02-21 | End: 2024-02-21

## 2024-02-21 RX ADMIN — INFLIXIMAB-DYYB 700 MILLIGRAM(S): 120 INJECTION SUBCUTANEOUS at 15:55

## 2024-02-21 RX ADMIN — INFLIXIMAB-DYYB 125 MILLIGRAM(S): 120 INJECTION SUBCUTANEOUS at 14:30

## 2024-02-23 ENCOUNTER — APPOINTMENT (OUTPATIENT)
Dept: COLORECTAL SURGERY | Facility: CLINIC | Age: 39
End: 2024-02-23

## 2024-03-01 ENCOUNTER — APPOINTMENT (OUTPATIENT)
Dept: COLORECTAL SURGERY | Facility: CLINIC | Age: 39
End: 2024-03-01
Payer: MEDICAID

## 2024-03-01 VITALS
HEIGHT: 63 IN | BODY MASS INDEX: 29.77 KG/M2 | DIASTOLIC BLOOD PRESSURE: 78 MMHG | TEMPERATURE: 98.3 F | HEART RATE: 70 BPM | OXYGEN SATURATION: 99 % | SYSTOLIC BLOOD PRESSURE: 132 MMHG | RESPIRATION RATE: 16 BRPM | WEIGHT: 168 LBS

## 2024-03-01 PROCEDURE — 99214 OFFICE O/P EST MOD 30 MIN: CPT

## 2024-03-01 NOTE — ASSESSMENT
[FreeTextEntry1] : A/P  Parastomal skin bridges and on healing areas are bit better as per patient. She knows that she will have to have some debridement done around the stoma but did not want to do it today. Buttock infections are improved on left side but on right are improved but remain productive of small amounts of pus (when pressed and squeezed). She says she has mostly no symptoms down there and doesnt want further surgical treatment in that area  Been on remicaid x 4 cycles and will get 2or 3 more cycles and then should get flex sig or colonoscopy by Dr. Shepherd (would like him to look and see what he thinks).  She asked about possible surgery to resect the diseased bowel and I reiterated my opinion that the level of scarring in her pelvis is such that an attempt to resect the bowel and the affected adnexa would be very risky and may not be feasible.  I have been recommending that she live with the stoma for this reason.  Also, the extent of her colitis has varied widely over relatively short periods of time.

## 2024-03-01 NOTE — REVIEW OF SYSTEMS
[Negative] : Heme/Lymph [Easy Bleeding] : no tendency for easy bleeding [Easy Bruising] : no tendency for easy bruising

## 2024-03-01 NOTE — PHYSICAL EXAM
[de-identified] : no masses, no tenderness, stoma has intact alliance and no blood [de-identified] : Left buttock much improved since last surgery and no pus extrudable and no drainage spots noted.  Right buttock is improved but there are about 5-6 drainage points noted when pressure is applied to the area (at rest there is not drainage evident)..  Digital not done.  Has some epitheliazed depressed areas (former ulcerations)

## 2024-03-01 NOTE — HISTORY OF PRESENT ILLNESS
[FreeTextEntry1] : 38 F with PMH GERD, Gastric sleeve and Left large ovarian abscess (2019) s/p drainage and cyst removal (benign pathology). 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 She was taken to OR on 1/26/23 for fistula take down and possible colostomy take down. Did not find connection to colon and thus did not do a colon resection. She only had ex lap and lysis of adhesions on 1/26/2023.  She was planned for colostomy closure in July. However, on x-ray barium enema on 6/21/23 showed Spiculated appearance of the distal descending colon and sigmoid colon. A subtle blind ending tract cannot be ruled out involving the proximal sigmoid. Correlation with f/u CTAP with retrograde injection of water-soluble contrast via the rectum was recommended.   CTAP was done on 7/25/23. It read, Extensive inflammatory changes in the left lower quadrant small bowel mesentery. There is a fistula between distal descending colon and sigmoid colon. Mildly dilated and thickened small bowel loop in the vicinity, grossly similar to 2022.  On 8/15/23 she was seen for some drainage from perianal area. On exam she was found to have multiple bilateral subcutaneous abscesses well away from and not involving the anus and had I and D done by Dr. Saeed in office.  On 10/2/23, she had new posterior long vertical midline ulceration. EUA planned.  She is now s/p EUA, biopsy of perianal, I&D of bilateral buttock abscess with seton placement, and Flex sig with biopsy on 10/10/23.   Pathology of ulcers and wounds showed benign mucosa with acute and chronic inflammation. Pathology of rectosigmoid, proximal and distal rectum biopsies showed Colonic mucosa with focal active colitis, mild architecture distortion and occasional noncaseating granuloma.   Blood test for ASCA IgA/IgG Antibodies were positive and Anti Neutrophil Cytoplasmic Antibody was negative.   Her case was presented at IDB conference and was and are confident after reviewing records with interdisciplinary team, she has severe fistulizing Crohn's disease. Now s/p 2 loading doses of IFX. 1st dose on 1/10/24. Had 3 infusions so far.  She now has ARIANA and she has appointment with hematologist for IV iron therapy.   She is here for follow up visit for the perianal abscess. Doing well. Drainage is minimal to none. Only the posterior area has drainage. Most of the abscess incision sites are almost closed as per patient.  Today she had menstruation and does not feel comfortable to have perianal exam.   Ostomy is functioning well. Stools is soft. Emptying about 3 times a day. Denies blood in stool. Peristomal skin is healing with new skin under the old skin flap. Denies any drainage from there.

## 2024-03-11 ENCOUNTER — OUTPATIENT (OUTPATIENT)
Dept: OUTPATIENT SERVICES | Facility: HOSPITAL | Age: 39
LOS: 1 days | Discharge: HOME | End: 2024-03-11

## 2024-03-11 DIAGNOSIS — Z90.3 ACQUIRED ABSENCE OF STOMACH [PART OF]: Chronic | ICD-10-CM

## 2024-03-11 DIAGNOSIS — Z98.890 OTHER SPECIFIED POSTPROCEDURAL STATES: Chronic | ICD-10-CM

## 2024-03-11 DIAGNOSIS — D50.9 IRON DEFICIENCY ANEMIA, UNSPECIFIED: ICD-10-CM

## 2024-03-18 ENCOUNTER — APPOINTMENT (OUTPATIENT)
Dept: HEMATOLOGY ONCOLOGY | Facility: CLINIC | Age: 39
End: 2024-03-18

## 2024-03-18 DIAGNOSIS — D50.9 IRON DEFICIENCY ANEMIA, UNSPECIFIED: ICD-10-CM

## 2024-03-22 ENCOUNTER — APPOINTMENT (OUTPATIENT)
Dept: INFUSION THERAPY | Facility: CLINIC | Age: 39
End: 2024-03-22

## 2024-03-22 ENCOUNTER — OUTPATIENT (OUTPATIENT)
Dept: OUTPATIENT SERVICES | Facility: HOSPITAL | Age: 39
LOS: 1 days | End: 2024-03-22
Payer: MEDICAID

## 2024-03-22 VITALS
TEMPERATURE: 98 F | DIASTOLIC BLOOD PRESSURE: 74 MMHG | SYSTOLIC BLOOD PRESSURE: 135 MMHG | RESPIRATION RATE: 18 BRPM | HEART RATE: 78 BPM | OXYGEN SATURATION: 97 %

## 2024-03-22 VITALS
TEMPERATURE: 99 F | SYSTOLIC BLOOD PRESSURE: 130 MMHG | OXYGEN SATURATION: 96 % | RESPIRATION RATE: 18 BRPM | HEIGHT: 63 IN | HEART RATE: 86 BPM | DIASTOLIC BLOOD PRESSURE: 79 MMHG | WEIGHT: 169.98 LBS

## 2024-03-22 DIAGNOSIS — Z98.890 OTHER SPECIFIED POSTPROCEDURAL STATES: Chronic | ICD-10-CM

## 2024-03-22 DIAGNOSIS — Z90.3 ACQUIRED ABSENCE OF STOMACH [PART OF]: Chronic | ICD-10-CM

## 2024-03-22 DIAGNOSIS — K50.10 CROHN'S DISEASE OF LARGE INTESTINE WITHOUT COMPLICATIONS: ICD-10-CM

## 2024-03-22 LAB
CRP SERPL-MCNC: 3.9 MG/L — SIGNIFICANT CHANGE UP (ref 0–4)
HCT VFR BLD CALC: 33 % — LOW (ref 34.5–45)
HGB BLD-MCNC: 9.9 G/DL — LOW (ref 11.5–15.5)
MCHC RBC-ENTMCNC: 21.2 PG — LOW (ref 27–34)
MCHC RBC-ENTMCNC: 30 GM/DL — LOW (ref 32–36)
MCV RBC AUTO: 70.5 FL — LOW (ref 80–100)
NRBC # BLD: 0 /100 WBCS — SIGNIFICANT CHANGE UP (ref 0–0)
PLATELET # BLD AUTO: 230 K/UL — SIGNIFICANT CHANGE UP (ref 150–400)
RBC # BLD: 4.68 M/UL — SIGNIFICANT CHANGE UP (ref 3.8–5.2)
RBC # FLD: 15.5 % — HIGH (ref 10.3–14.5)
WBC # BLD: 6.48 K/UL — SIGNIFICANT CHANGE UP (ref 3.8–10.5)
WBC # FLD AUTO: 6.48 K/UL — SIGNIFICANT CHANGE UP (ref 3.8–10.5)

## 2024-03-22 PROCEDURE — 96413 CHEMO IV INFUSION 1 HR: CPT

## 2024-03-22 PROCEDURE — 85027 COMPLETE CBC AUTOMATED: CPT

## 2024-03-22 PROCEDURE — 36415 COLL VENOUS BLD VENIPUNCTURE: CPT

## 2024-03-22 PROCEDURE — 82542 COL CHROMOTOGRAPHY QUAL/QUAN: CPT

## 2024-03-22 PROCEDURE — 86140 C-REACTIVE PROTEIN: CPT

## 2024-03-22 PROCEDURE — 80230 DRUG ASSAY INFLIXIMAB: CPT

## 2024-03-22 RX ORDER — INFLIXIMAB-DYYB 120 MG/ML
380 INJECTION SUBCUTANEOUS ONCE
Refills: 0 | Status: COMPLETED | OUTPATIENT
Start: 2024-03-22 | End: 2024-03-22

## 2024-03-22 RX ADMIN — INFLIXIMAB-DYYB 380 MILLIGRAM(S): 120 INJECTION SUBCUTANEOUS at 17:05

## 2024-03-22 RX ADMIN — INFLIXIMAB-DYYB 125 MILLIGRAM(S): 120 INJECTION SUBCUTANEOUS at 15:30

## 2024-03-22 NOTE — DISCHARGE INSTRUCTIONS: GENERAL THERAPY - PATIENT/SIGNIFICANT OTHER VERBALIZE(S) UNDERSTANDING OF TREATMENT PLAN AND THE POTENTIAL SIDE EFFECTS OF THE MEDICATIONS GIVEN DURING TREATMENT AND/OR MEDICATIONS USED AT HOME TO MANAGE SIDE EFFECTS
Problem: Patient Care Overview  Goal: Plan of Care/Patient Progress Review  Outcome: Therapy, progress towards functional goals is fair  (see note under  [facility] from now)       Statement Selected

## 2024-03-27 LAB
ANTIBODIES TO INFLIXIMAB (ATI) CONCENTRATION: < 3.1 U/ML — SIGNIFICANT CHANGE UP
PROMETHEUS ANSER IFX RESULT: SIGNIFICANT CHANGE UP
PROMETHEUS LABORATORY FOOTER: SIGNIFICANT CHANGE UP
SERUM INFLIXIMAB (IFX) CONCENTRATION: 11.4 UG/ML — SIGNIFICANT CHANGE UP

## 2024-04-08 NOTE — PATIENT PROFILE ADULT - NSPROPTRIGHTSUPPORTPERSON_GEN_A_NUR
Message received from pt  Hi Dr. Valreo  I hope this message finds you well. I’m writing to bring to your attention a concerning issue regarding my medication prescription.     Upon attempting to refill my prescription for oxybutynin, which I rely on to manage my overactive bladder, I discovered that it had been removed from my prescription list. After some investigation, it seems that this may have occurred inadvertently.     Could we get it back on the list? It’s been a big help with my overactive bladder, and I’d really appreciate your assistance in sorting this out.     Thanks a bunch!    Oxybutynin refilled by Dr Redd     Please advise on refill request    declines

## 2024-05-03 ENCOUNTER — APPOINTMENT (OUTPATIENT)
Dept: COLORECTAL SURGERY | Facility: CLINIC | Age: 39
End: 2024-05-03

## 2024-05-28 ENCOUNTER — TRANSCRIPTION ENCOUNTER (OUTPATIENT)
Age: 39
End: 2024-05-28

## 2024-05-29 ENCOUNTER — APPOINTMENT (OUTPATIENT)
Dept: GASTROENTEROLOGY | Facility: CLINIC | Age: 39
End: 2024-05-29

## 2024-06-07 ENCOUNTER — NON-APPOINTMENT (OUTPATIENT)
Age: 39
End: 2024-06-07

## 2024-06-07 DIAGNOSIS — K50.10 CROHN'S DISEASE OF LARGE INTESTINE W/OUT COMPLICATIONS: ICD-10-CM

## 2024-06-12 ENCOUNTER — TRANSCRIPTION ENCOUNTER (OUTPATIENT)
Age: 39
End: 2024-06-12

## 2024-06-12 ENCOUNTER — APPOINTMENT (OUTPATIENT)
Dept: GASTROENTEROLOGY | Facility: CLINIC | Age: 39
End: 2024-06-12
Payer: COMMERCIAL

## 2024-06-12 VITALS
OXYGEN SATURATION: 99 % | SYSTOLIC BLOOD PRESSURE: 120 MMHG | DIASTOLIC BLOOD PRESSURE: 70 MMHG | HEIGHT: 63 IN | BODY MASS INDEX: 29.59 KG/M2 | RESPIRATION RATE: 14 BRPM | WEIGHT: 167 LBS | TEMPERATURE: 95.8 F | HEART RATE: 84 BPM

## 2024-06-12 DIAGNOSIS — F17.200 NICOTINE DEPENDENCE, UNSPECIFIED, UNCOMPLICATED: ICD-10-CM

## 2024-06-12 LAB
ALBUMIN SERPL ELPH-MCNC: 3.8 G/DL
ALP BLD-CCNC: 89 U/L
ALT SERPL-CCNC: 9 U/L
ANION GAP SERPL CALC-SCNC: 13 MMOL/L
AST SERPL-CCNC: 15 U/L
BASOPHILS # BLD AUTO: 0.02 K/UL
BASOPHILS NFR BLD AUTO: 0.2 %
BILIRUB SERPL-MCNC: 0.3 MG/DL
BUN SERPL-MCNC: 10 MG/DL
CALCIUM SERPL-MCNC: 8.7 MG/DL
CHLORIDE SERPL-SCNC: 100 MMOL/L
CO2 SERPL-SCNC: 25 MMOL/L
CREAT SERPL-MCNC: 0.77 MG/DL
CRP SERPL-MCNC: 27 MG/L
EGFR: 102 ML/MIN/1.73M2
EOSINOPHIL # BLD AUTO: 0.22 K/UL
EOSINOPHIL NFR BLD AUTO: 2.5 %
GLUCOSE SERPL-MCNC: 100 MG/DL
HCT VFR BLD CALC: 34.3 %
HGB BLD-MCNC: 9.9 G/DL
IMM GRANULOCYTES NFR BLD AUTO: 0.5 %
LYMPHOCYTES # BLD AUTO: 1 K/UL
LYMPHOCYTES NFR BLD AUTO: 11.6 %
MAN DIFF?: NORMAL
MCHC RBC-ENTMCNC: 20.4 PG
MCHC RBC-ENTMCNC: 28.9 GM/DL
MCV RBC AUTO: 70.6 FL
MONOCYTES # BLD AUTO: 0.69 K/UL
MONOCYTES NFR BLD AUTO: 8 %
NEUTROPHILS # BLD AUTO: 6.66 K/UL
NEUTROPHILS NFR BLD AUTO: 77.2 %
PLATELET # BLD AUTO: 353 K/UL
POTASSIUM SERPL-SCNC: 4.5 MMOL/L
PROT SERPL-MCNC: 7.9 G/DL
RBC # BLD: 4.86 M/UL
RBC # FLD: 18.6 %
SODIUM SERPL-SCNC: 138 MMOL/L
WBC # FLD AUTO: 8.63 K/UL

## 2024-06-12 PROCEDURE — 99214 OFFICE O/P EST MOD 30 MIN: CPT

## 2024-06-12 PROCEDURE — G2211 COMPLEX E/M VISIT ADD ON: CPT

## 2024-06-12 RX ORDER — METRONIDAZOLE 250 MG/1
250 TABLET ORAL
Qty: 42 | Refills: 1 | Status: ACTIVE | COMMUNITY
Start: 2024-06-12 | End: 1900-01-01

## 2024-06-12 RX ORDER — NYSTATIN 100000 [USP'U]/G
100000 CREAM TOPICAL
Qty: 1 | Refills: 2 | Status: COMPLETED | COMMUNITY
Start: 2024-01-12 | End: 2024-06-12

## 2024-06-18 NOTE — CONSULT LETTER
[Dear  ___] : Dear  [unfilled], [Courtesy Letter:] : I had the pleasure of seeing your patient, [unfilled], in my office today. [Please see my note below.] : Please see my note below. [Sincerely,] : Sincerely, [FreeTextEntry3] : Serge Shepherd MD Professor of Medicine Chief of GI Director IBD Program [facility]

## 2024-06-18 NOTE — PHYSICAL EXAM
[Normal] : normal bowel sounds, non-tender, no masses, soft, no no hepato-splenomegaly [de-identified] : Coloscopy in midline of abdomen

## 2024-06-18 NOTE — ASSESSMENT
[FreeTextEntry1] : This is a 38 year old female with fistulizing Crohn's disease, gastric sleeve, and GERD who presents today with increasing fatigue. Her case was previously presented at IBD multidisciplinary conference to discuss diagnosis as well as history of multiple fistulas, at which point she was formally diagnosed with fistulizing Crohn's disease, was referred to our IBD office, and initiated IFX 10mg/kg q8 weeks 1/2024. Completed 3 loading infusions 2/2024 then subsequently had lapse in insurance. Last IFX infusion was 3/22/2024 at 5mg/kg. She has been off advanced therapy since. Patient with increasing fatigue and occasional abdominal discomfort over previous few weeks. She reports that she clearly had a clinical response to the loading doses of IFX.  #Fistulizing Crohn's disease, clinical decline #Recurrent perianal abscesses - Patient has now had re-approval of IFX through insurance. Will plan to reload patient with IFX 10mg/kg with maintenance plan for q8 week infusions - As it has been less than 6 months since previous IFX infusion, low likelihood patient will have developed antibodies to drug or will have infusion reaction. However, will need to closely monitor during infusion cycle.  - Continue perianal abscess management and evaluation through Dr. Saeed - Metronidazole 250mg orally twice daily during induction phase of IFX for perianal abscess prophylaxis - Again heavily encouraged patient for tobacco cessation.  consultation has been placed for counseling - Will plan for colonoscopy around 6-12 months after the reinitiation of IFX to evaluate for musocal healing - Will have patient meet with our in-house dietitian Marly Garcia as some of patient's symptoms seem to be dietary related. Patient to keep extensive food/symptom diary for further evaluation  Follow up after 2 IFX infusions  DO Jay Alvarado Advanced Inflammatory Bowel Disease Fellow

## 2024-06-18 NOTE — HISTORY OF PRESENT ILLNESS
[FreeTextEntry1] : This is a 38 year old female with fistulizing Crohn's disease, gastric sleeve, and GERD who presents today with increasing fatigue. Patient developed large ovarian abscess in 2019, which required drain and cyst removal, and was found to be benign. 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 Went for planned fistula and ostomy takedown 1/26/23, however only underwent lysis of adhesions as was unable to identify colonic connection. She also has history of perianal and vaginal abscesses for which she has had multiple I/Ds with her colorectal surgeon Dr. Saeed. Her case was previously presented at IBD multidisciplinary conference, at which point she was formally diagnosed with fistulizing Crohn's disease, was referred to our IBD office, and initiated IFX 10mg/kg q8 weeks 1/2024. Completed 3 loading infusions 2/2024 then subsequently had lapse in insurance. Last IFX infusion was 3/22/2024 at 5mg/kg. She has been off advanced therapy since.   6/12/2024 Patient with increasing fatigue and occasional abdominal discomfort over previous few weeks. Describes emptying her colostomy bag 2-3 times a day. No obvious blood in colostomy output. Denies nausea and vomiting. Continues to smoke cigarettes daily. Has previously noticed perianal abscess increasing in size and pain when IBD symptoms worse. Currently minimally draining according to patient, but denies fevers, chills, and night sweats at home. Denies new rash, joint pains, or other extraintestinal manifestations.

## 2024-06-24 ENCOUNTER — OUTPATIENT (OUTPATIENT)
Dept: OUTPATIENT SERVICES | Facility: HOSPITAL | Age: 39
LOS: 1 days | End: 2024-06-24
Payer: COMMERCIAL

## 2024-06-24 ENCOUNTER — APPOINTMENT (OUTPATIENT)
Dept: INFUSION THERAPY | Facility: CLINIC | Age: 39
End: 2024-06-24

## 2024-06-24 VITALS
TEMPERATURE: 97 F | SYSTOLIC BLOOD PRESSURE: 132 MMHG | DIASTOLIC BLOOD PRESSURE: 84 MMHG | HEART RATE: 778 BPM | RESPIRATION RATE: 16 BRPM | OXYGEN SATURATION: 100 %

## 2024-06-24 VITALS
WEIGHT: 162.92 LBS | HEIGHT: 63 IN | DIASTOLIC BLOOD PRESSURE: 79 MMHG | TEMPERATURE: 97 F | HEART RATE: 76 BPM | OXYGEN SATURATION: 99 % | SYSTOLIC BLOOD PRESSURE: 126 MMHG | RESPIRATION RATE: 18 BRPM

## 2024-06-24 DIAGNOSIS — Z98.890 OTHER SPECIFIED POSTPROCEDURAL STATES: Chronic | ICD-10-CM

## 2024-06-24 DIAGNOSIS — K50.10 CROHN'S DISEASE OF LARGE INTESTINE WITHOUT COMPLICATIONS: ICD-10-CM

## 2024-06-24 DIAGNOSIS — Z90.3 ACQUIRED ABSENCE OF STOMACH [PART OF]: Chronic | ICD-10-CM

## 2024-06-24 LAB
ALBUMIN SERPL ELPH-MCNC: 3.5 G/DL — SIGNIFICANT CHANGE UP (ref 3.3–5)
ALP SERPL-CCNC: 98 U/L — SIGNIFICANT CHANGE UP (ref 40–120)
ALT FLD-CCNC: 5 U/L — LOW (ref 10–45)
ANION GAP SERPL CALC-SCNC: 10 MMOL/L — SIGNIFICANT CHANGE UP (ref 5–17)
AST SERPL-CCNC: 12 U/L — SIGNIFICANT CHANGE UP (ref 10–40)
BILIRUB SERPL-MCNC: 0.2 MG/DL — SIGNIFICANT CHANGE UP (ref 0.2–1.2)
BUN SERPL-MCNC: 9 MG/DL — SIGNIFICANT CHANGE UP (ref 7–23)
CALCIUM SERPL-MCNC: 8.3 MG/DL — LOW (ref 8.4–10.5)
CHLORIDE SERPL-SCNC: 104 MMOL/L — SIGNIFICANT CHANGE UP (ref 96–108)
CO2 SERPL-SCNC: 24 MMOL/L — SIGNIFICANT CHANGE UP (ref 22–31)
CREAT SERPL-MCNC: 0.66 MG/DL — SIGNIFICANT CHANGE UP (ref 0.5–1.3)
CRP SERPL-MCNC: 5.9 MG/L — HIGH (ref 0–4)
EGFR: 116 ML/MIN/1.73M2 — SIGNIFICANT CHANGE UP
GLUCOSE SERPL-MCNC: 81 MG/DL — SIGNIFICANT CHANGE UP (ref 70–99)
HCT VFR BLD CALC: 34.8 % — SIGNIFICANT CHANGE UP (ref 34.5–45)
HGB BLD-MCNC: 10 G/DL — LOW (ref 11.5–15.5)
MCHC RBC-ENTMCNC: 20.2 PG — LOW (ref 27–34)
MCHC RBC-ENTMCNC: 28.7 GM/DL — LOW (ref 32–36)
MCV RBC AUTO: 70.2 FL — LOW (ref 80–100)
NRBC # BLD: 0 /100 WBCS — SIGNIFICANT CHANGE UP (ref 0–0)
PLATELET # BLD AUTO: 344 K/UL — SIGNIFICANT CHANGE UP (ref 150–400)
POTASSIUM SERPL-MCNC: 3.9 MMOL/L — SIGNIFICANT CHANGE UP (ref 3.5–5.3)
POTASSIUM SERPL-SCNC: 3.9 MMOL/L — SIGNIFICANT CHANGE UP (ref 3.5–5.3)
PROT SERPL-MCNC: 7.9 G/DL — SIGNIFICANT CHANGE UP (ref 6–8.3)
RBC # BLD: 4.96 M/UL — SIGNIFICANT CHANGE UP (ref 3.8–5.2)
RBC # FLD: 17.8 % — HIGH (ref 10.3–14.5)
SODIUM SERPL-SCNC: 138 MMOL/L — SIGNIFICANT CHANGE UP (ref 135–145)
WBC # BLD: 9.21 K/UL — SIGNIFICANT CHANGE UP (ref 3.8–10.5)
WBC # FLD AUTO: 9.21 K/UL — SIGNIFICANT CHANGE UP (ref 3.8–10.5)

## 2024-06-24 PROCEDURE — 96415 CHEMO IV INFUSION ADDL HR: CPT

## 2024-06-24 PROCEDURE — 80053 COMPREHEN METABOLIC PANEL: CPT

## 2024-06-24 PROCEDURE — 36415 COLL VENOUS BLD VENIPUNCTURE: CPT

## 2024-06-24 PROCEDURE — 86140 C-REACTIVE PROTEIN: CPT

## 2024-06-24 PROCEDURE — 85027 COMPLETE CBC AUTOMATED: CPT

## 2024-06-24 PROCEDURE — 96413 CHEMO IV INFUSION 1 HR: CPT

## 2024-06-24 RX ORDER — INFLIXIMAB 100 MG/1
700 INJECTION, POWDER, LYOPHILIZED, FOR SOLUTION INTRAVENOUS ONCE
Refills: 0 | Status: COMPLETED | OUTPATIENT
Start: 2024-06-24 | End: 2024-06-24

## 2024-06-24 RX ADMIN — INFLIXIMAB 700 MILLIGRAM(S): 100 INJECTION, POWDER, LYOPHILIZED, FOR SOLUTION INTRAVENOUS at 16:30

## 2024-06-24 RX ADMIN — INFLIXIMAB 125 MILLIGRAM(S): 100 INJECTION, POWDER, LYOPHILIZED, FOR SOLUTION INTRAVENOUS at 14:18

## 2024-07-05 RX ORDER — INFLIXIMAB 100 MG/1
700 INJECTION, POWDER, LYOPHILIZED, FOR SOLUTION INTRAVENOUS ONCE
Refills: 0 | Status: COMPLETED | OUTPATIENT
Start: 2024-07-08 | End: 2024-07-08

## 2024-07-08 ENCOUNTER — APPOINTMENT (OUTPATIENT)
Dept: INFUSION THERAPY | Facility: CLINIC | Age: 39
End: 2024-07-08

## 2024-07-08 ENCOUNTER — OUTPATIENT (OUTPATIENT)
Dept: OUTPATIENT SERVICES | Facility: HOSPITAL | Age: 39
LOS: 1 days | End: 2024-07-08
Payer: COMMERCIAL

## 2024-07-08 VITALS
RESPIRATION RATE: 16 BRPM | SYSTOLIC BLOOD PRESSURE: 118 MMHG | TEMPERATURE: 98 F | OXYGEN SATURATION: 100 % | DIASTOLIC BLOOD PRESSURE: 77 MMHG | HEART RATE: 61 BPM

## 2024-07-08 VITALS
HEIGHT: 63 IN | HEART RATE: 76 BPM | SYSTOLIC BLOOD PRESSURE: 117 MMHG | TEMPERATURE: 98 F | OXYGEN SATURATION: 100 % | WEIGHT: 171.08 LBS | DIASTOLIC BLOOD PRESSURE: 78 MMHG | RESPIRATION RATE: 16 BRPM

## 2024-07-08 DIAGNOSIS — K50.10 CROHN'S DISEASE OF LARGE INTESTINE WITHOUT COMPLICATIONS: ICD-10-CM

## 2024-07-08 DIAGNOSIS — Z98.890 OTHER SPECIFIED POSTPROCEDURAL STATES: Chronic | ICD-10-CM

## 2024-07-08 DIAGNOSIS — Z90.3 ACQUIRED ABSENCE OF STOMACH [PART OF]: Chronic | ICD-10-CM

## 2024-07-08 LAB
ALBUMIN SERPL ELPH-MCNC: 3.8 G/DL — SIGNIFICANT CHANGE UP (ref 3.3–5)
ALP SERPL-CCNC: 106 U/L — SIGNIFICANT CHANGE UP (ref 40–120)
ALT FLD-CCNC: 7 U/L — LOW (ref 10–45)
ANION GAP SERPL CALC-SCNC: 9 MMOL/L — SIGNIFICANT CHANGE UP (ref 5–17)
AST SERPL-CCNC: 16 U/L — SIGNIFICANT CHANGE UP (ref 10–40)
BILIRUB SERPL-MCNC: 0.3 MG/DL — SIGNIFICANT CHANGE UP (ref 0.2–1.2)
BUN SERPL-MCNC: 12 MG/DL — SIGNIFICANT CHANGE UP (ref 7–23)
CALCIUM SERPL-MCNC: 8.6 MG/DL — SIGNIFICANT CHANGE UP (ref 8.4–10.5)
CHLORIDE SERPL-SCNC: 104 MMOL/L — SIGNIFICANT CHANGE UP (ref 96–108)
CO2 SERPL-SCNC: 26 MMOL/L — SIGNIFICANT CHANGE UP (ref 22–31)
CREAT SERPL-MCNC: 0.66 MG/DL — SIGNIFICANT CHANGE UP (ref 0.5–1.3)
CRP SERPL-MCNC: 5.4 MG/L — HIGH (ref 0–4)
EGFR: 116 ML/MIN/1.73M2 — SIGNIFICANT CHANGE UP
GLUCOSE SERPL-MCNC: 59 MG/DL — LOW (ref 70–99)
HCT VFR BLD CALC: 34.5 % — SIGNIFICANT CHANGE UP (ref 34.5–45)
HGB BLD-MCNC: 9.8 G/DL — LOW (ref 11.5–15.5)
MCHC RBC-ENTMCNC: 20.1 PG — LOW (ref 27–34)
MCHC RBC-ENTMCNC: 28.4 GM/DL — LOW (ref 32–36)
MCV RBC AUTO: 70.8 FL — LOW (ref 80–100)
NRBC # BLD: 0 /100 WBCS — SIGNIFICANT CHANGE UP (ref 0–0)
PLATELET # BLD AUTO: 297 K/UL — SIGNIFICANT CHANGE UP (ref 150–400)
POTASSIUM SERPL-MCNC: 4.1 MMOL/L — SIGNIFICANT CHANGE UP (ref 3.5–5.3)
POTASSIUM SERPL-SCNC: 4.1 MMOL/L — SIGNIFICANT CHANGE UP (ref 3.5–5.3)
PROT SERPL-MCNC: 8 G/DL — SIGNIFICANT CHANGE UP (ref 6–8.3)
RBC # BLD: 4.87 M/UL — SIGNIFICANT CHANGE UP (ref 3.8–5.2)
RBC # FLD: 17.9 % — HIGH (ref 10.3–14.5)
SODIUM SERPL-SCNC: 139 MMOL/L — SIGNIFICANT CHANGE UP (ref 135–145)
WBC # BLD: 10.09 K/UL — SIGNIFICANT CHANGE UP (ref 3.8–10.5)
WBC # FLD AUTO: 10.09 K/UL — SIGNIFICANT CHANGE UP (ref 3.8–10.5)

## 2024-07-08 PROCEDURE — 36415 COLL VENOUS BLD VENIPUNCTURE: CPT

## 2024-07-08 PROCEDURE — 86140 C-REACTIVE PROTEIN: CPT

## 2024-07-08 PROCEDURE — 96415 CHEMO IV INFUSION ADDL HR: CPT

## 2024-07-08 PROCEDURE — 80053 COMPREHEN METABOLIC PANEL: CPT

## 2024-07-08 PROCEDURE — 85027 COMPLETE CBC AUTOMATED: CPT

## 2024-07-08 PROCEDURE — 96413 CHEMO IV INFUSION 1 HR: CPT

## 2024-07-08 RX ADMIN — INFLIXIMAB 125 MILLIGRAM(S): 100 INJECTION, POWDER, LYOPHILIZED, FOR SOLUTION INTRAVENOUS at 14:40

## 2024-07-08 RX ADMIN — INFLIXIMAB 700 MILLIGRAM(S): 100 INJECTION, POWDER, LYOPHILIZED, FOR SOLUTION INTRAVENOUS at 16:10

## 2024-08-05 ENCOUNTER — OUTPATIENT (OUTPATIENT)
Dept: OUTPATIENT SERVICES | Facility: HOSPITAL | Age: 39
LOS: 1 days | End: 2024-08-05

## 2024-08-05 ENCOUNTER — APPOINTMENT (OUTPATIENT)
Dept: INFUSION THERAPY | Facility: CLINIC | Age: 39
End: 2024-08-05

## 2024-08-05 DIAGNOSIS — Z98.890 OTHER SPECIFIED POSTPROCEDURAL STATES: Chronic | ICD-10-CM

## 2024-08-05 DIAGNOSIS — K50.10 CROHN'S DISEASE OF LARGE INTESTINE WITHOUT COMPLICATIONS: ICD-10-CM

## 2024-08-05 DIAGNOSIS — Z90.3 ACQUIRED ABSENCE OF STOMACH [PART OF]: Chronic | ICD-10-CM

## 2024-08-12 ENCOUNTER — APPOINTMENT (OUTPATIENT)
Dept: RHEUMATOLOGY | Facility: CLINIC | Age: 39
End: 2024-08-12
Payer: COMMERCIAL

## 2024-08-12 VITALS
SYSTOLIC BLOOD PRESSURE: 111 MMHG | TEMPERATURE: 98.1 F | HEART RATE: 81 BPM | OXYGEN SATURATION: 97 % | DIASTOLIC BLOOD PRESSURE: 69 MMHG | RESPIRATION RATE: 14 BRPM

## 2024-08-12 VITALS
SYSTOLIC BLOOD PRESSURE: 122 MMHG | OXYGEN SATURATION: 97 % | RESPIRATION RATE: 16 BRPM | TEMPERATURE: 98 F | DIASTOLIC BLOOD PRESSURE: 78 MMHG | HEART RATE: 100 BPM

## 2024-08-12 DIAGNOSIS — K50.10 CROHN'S DISEASE OF LARGE INTESTINE W/OUT COMPLICATIONS: ICD-10-CM

## 2024-08-12 PROCEDURE — 96415 CHEMO IV INFUSION ADDL HR: CPT

## 2024-08-12 PROCEDURE — 36415 COLL VENOUS BLD VENIPUNCTURE: CPT

## 2024-08-12 PROCEDURE — 96413 CHEMO IV INFUSION 1 HR: CPT

## 2024-08-12 NOTE — HISTORY OF PRESENT ILLNESS
[Denies] : Denies [No] : No [Yes] : Yes [Declined] : Declined [24g] : 24g [Start Time: ___] : Medication Start Time: [unfilled] [End Time: ___] : Medication End Time: [unfilled] [IV discontinued. Intact. No signs or symptoms of IV complications noted. Time: ___] : IV discontinued. Intact. No signs or symptoms of IV complications noted. Time: [unfilled] [Patient  instructed to seek medical attention with signs and symptoms of adverse effects] : Patient  instructed to seek medical attention with signs and symptoms of adverse effects [Patient left unit in no acute distress] : Patient left unit in no acute distress [Medications administered as ordered and tolerated well.] : Medications administered as ordered and tolerated well. [Blood drawn at time of visit] : Blood drawn at time of visit [de-identified] : Left forearm [de-identified] : 2:13pm

## 2024-08-18 ENCOUNTER — NON-APPOINTMENT (OUTPATIENT)
Age: 39
End: 2024-08-18

## 2024-08-19 ENCOUNTER — APPOINTMENT (OUTPATIENT)
Dept: GASTROENTEROLOGY | Facility: CLINIC | Age: 39
End: 2024-08-19
Payer: COMMERCIAL

## 2024-08-19 VITALS
HEIGHT: 63 IN | BODY MASS INDEX: 29.59 KG/M2 | TEMPERATURE: 97.9 F | DIASTOLIC BLOOD PRESSURE: 78 MMHG | OXYGEN SATURATION: 99 % | SYSTOLIC BLOOD PRESSURE: 110 MMHG | RESPIRATION RATE: 15 BRPM | HEART RATE: 72 BPM | WEIGHT: 167 LBS

## 2024-08-19 PROCEDURE — G2211 COMPLEX E/M VISIT ADD ON: CPT

## 2024-08-19 PROCEDURE — 99214 OFFICE O/P EST MOD 30 MIN: CPT

## 2024-08-19 RX ORDER — FOLIC ACID 1 MG/1
1 TABLET ORAL DAILY
Qty: 30 | Refills: 3 | Status: ACTIVE | COMMUNITY
Start: 2024-08-19 | End: 1900-01-01

## 2024-08-20 LAB
25(OH)D3 SERPL-MCNC: 12.4 NG/ML
FERRITIN SERPL-MCNC: 8 NG/ML
FOLATE SERPL-MCNC: 8.3 NG/ML
IRON SATN MFR SERPL: 4 %
IRON SERPL-MCNC: 17 UG/DL
TIBC SERPL-MCNC: 450 UG/DL
UIBC SERPL-MCNC: 433 UG/DL
VIT B12 SERPL-MCNC: 392 PG/ML

## 2024-08-21 NOTE — HISTORY OF PRESENT ILLNESS
[FreeTextEntry1] : Ms. FADI MCLEOD is a 38 year old female with a MHx of fistulizing Crohn's disease, gastric sleeve (4/2021), anemia who presents for follow up.  IBD-Hx: Patient developed large ovarian abscess in 2019, which required drain and cyst removal, and was found to be benign. 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 Underwent colonoscopy with diffuse mild inflammation was found in the rectum, rectosigmoid colon and in the sigmoid colon and descending colon secondary to diversion colitis, one 6mm polyp in descending colon, localized mild inflammation was found in the transverse colon - path - transverse colon - Colonic mucosa without significant histologic abnormality, sigmoid - Colonic mucosa without significant histologic abnormality, descending - inflammatory polyp.Negative for dysplasia or malignancy. She was planned fistula and ostomy takedown 1/26/23, however A connection to the colon was not found and thus a colon resection was not done, thus only underwent ex lap and lysis of adhesions on 1/26/2023. Colonoscopy 6/14/2023 - colonoscopy - diverticulosis in sigmoid colon, diffuse mild inflammation was found in the sigmoid colon and in the descending colon secondary to diversion colitis Colon, at 50 cm; biopsy: Colonic mucosa with acute and chronic inflammation. See note. Colon, at 48 cm - Colonic mucosa with acute and chronic inflammation. Colon, at 45 cm - Colonic mucosa with acute and chronic inflammation. Note: Sections of the 3 biopsies show colonic mucosa with increased lymphoplasmacytic infiltrate in the lamina propria, neutrophilic cryptitis, neutrophils in the lamina propria, and crypt abscesses. In addition, parts 1 and 3 show well-formed granulomas. There is no evidence of dysplasia.She was planned for colostomy closure in July. However, on x-ray barium enema on 6/21/23 there was a spiculated appearance of the distal descending colon and sigmoid colon. A subtle blind ending tract cannot be ruled out involving the proximal sigmoid. Correlation with f/u CTAP with retrograde injection of water-soluble contrast via the rectum was recommended, which she underwent on 7/25/23 showing extensive inflammatory changes in the left lower quadrant small bowel mesentery,  a fistula between distal descending colon and sigmoid colon, Mildly dilated and thickened small bowel loop in the vicinity, grossly similar to 2022. 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 On 8/15/23 she was seen for some drainage from perianal area. On exam she was found to have multiple bilateral subcutaneous abscesses not involving the anus and had an I&D done by Dr. Saeed in office. On 10/2/23, she developed new posterior long vertical midline ulceration. EUA planned. She is now s/p EUA, biopsy of perianal, I&D of bilateral buttock abscess with seton placement, and Flex sig with biopsy on 10/10/23. Pathology of ulcers and wounds showed benign mucosa with acute and chronic inflammation. Pathology of rectosigmoid, proximal and distal rectum biopsies showed Colonic mucosa with focal active colitis, mild architecture distortion and occasional noncaseating granuloma. On 11/8/2023 - seen by [facility] - Patient was referred by Dr. Saeed for evaluation for possible Crohns disease with history of colocutaneous fistula and colovesicular fistula and recently developed alma rosa-stomal and alma rosa-vaginal fistulas for which she had numerous I&Ds at the end of visit patient thought to have  fistulizing Crohns disease, but given complexity of case was presented to multidisciplinary IBD conference. Her case was  presented at IBD multidisciplinary conference, at which point she was formally diagnosed with fistulizing Crohn's disease. Eventually initiated IFX 10mg/kg q8 weeks 1/2024. Completed 3 loading infusions 2/2024 then subsequently had lapse in insurance with last IFX infusion was 3/22/2024.  6/12/24 - GI-office - given lapse in IFX,  reload patient with IFX 10mg/kg with maintenance plan for q8 week infusions  Interim Data: Labs 8/7/2024 - Hb 9.7, MCV low at 71.2, plts 322, CMP WNL, CRP elevated at 10, IFX-Level 0, ATI 16.1  Today - she has no completed induction dosing of IFX. She overall feels much improved however does have a couple complaints. For 72 hours post-infusion, she has restless leg syndrome, arthralgia and insomnia. These resolve but do bother her some. She will also get an occasional "flare-up" of abdominal pain where she will manage by stopping all food and drinking a liquid diet. She will also get abdominal cramping with her menstrual cycles and this cramping is difficult for her to decipher between Crohns symptoms vs menstrual pain. She also has noticed fatigue which is worse when she has her menstrual cycle. She has been trying to increase her PO iron intake. She has noticed her GERD is worse but manages with OTC PRN. Her perinal fistula drainage is basically non-existent although she does wear a pad for extra protection she has not seen any drainage since being on IFX. She never used metronidazole for fistula drainage and has not been on steroids recently. She changes her ostomy bag once per day. She continues to smoke tobacco and does use marijuana.

## 2024-08-21 NOTE — ASSESSMENT
[FreeTextEntry1] : Ms. MCLEOD is a 38 yeary/o with MHx of gastric sleeve, anemia,  fistulizing Crohn's disease, whose wife is a physician (med-peds, Dr Rios) she developed large ovarian abscess in 2019, which required drain and cyst removal, and was found to be benign. 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 Went for planned fistula and ostomy takedown 1/26/23, however only underwent lysis of adhesions as was unable to identify colon in scar. She also has history of perianal and vaginal abscesses for which she has had multiple I/Ds with her colorectal surgeon. Her case was previously presented at IBD multidisciplinary conference, at which point she was formally diagnosed with fistulizing Crohn's disease, initially started on IFX but due to insurance had lapse in infusion and re-loaded with IFX on 6/24/24 who has now completed induction.     Fistulizing Crohn's Disease: -diagnosed after multi-disciplinary conference in 2023, currently she has completed her induction dosing of IFX, and is pending first maintenance trial, however recent labs on 8/7/24 indicate ATI and no IFX levels and her symptoms of arthralgia are concerning for antibody deposition (+ATI) and thus will start MTX 25mg PO weekly with folic acid supplementation with hopes of decreasing ATI  -continue IFX 10mg/kg q8 weeks -discussed with patient that eventually will need to evaluate disease endoscopically but want to ensure on stable dosing of IFX prior to endoscopy to truly assess mucosal healing -for perianal fistula drainage - discussed with patient that if were to develop drainage can start metronidazole 250mg daily for 1 week and assess response and if there is good response take week of and restart -For MTX - Infrequently reported side effects include flu-like symptoms (nausea, vomiting, headache, fatigue, and diarrhea), mouth sores, and low white blood cell count. Less common side effects include scarring of the liver and lung inflammation. Some of the side effects can be prevented by adding folic acid (a vitamin).  Patient has no plans for pregnancy, will continue to monitor with blood work at next visit -discussed care with patient's wife, Dr Rios, with patients permission -will obtain TMPT in case patient were to require 6-MP/AZA  Microcytic Anemia -suspect patient is iron deficient given menses and decrease ability to absorb iron given gastric sleeve, thus will obtain iron-panel and refer to heme for consideration of parenteral iron  IBD HCM -encouraged patient to quit tobacco smoking   Hx of Gastric Sleeve -continue GERD mgmt PRN -obtain folic acid, B12, Vit-D  RTC: 1 month  MD Jay Hunt IBD Fellow [facility]   Discussed with Dr Shepherd

## 2024-08-21 NOTE — PHYSICAL EXAM
[Abdomen Tenderness] : non-tender [Normal] : oriented to person, place, and time [de-identified] : stoma is pink and ostomoy bag with brown stool and skin around ostomy is c/d/i

## 2024-08-21 NOTE — PHYSICAL EXAM
[Abdomen Tenderness] : non-tender [Normal] : oriented to person, place, and time [de-identified] : stoma is pink and ostomoy bag with brown stool and skin around ostomy is c/d/i

## 2024-08-21 NOTE — ASSESSMENT
[FreeTextEntry1] : Ms. MCLEOD is a 38 yeary/o with MHx of gastric sleeve, anemia,  fistulizing Crohn's disease, whose wife is a physician (med-peds, Dr Rios) she developed large ovarian abscess in 2019, which required drain and cyst removal, and was found to be benign. 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 Went for planned fistula and ostomy takedown 1/26/23, however only underwent lysis of adhesions as was unable to identify colon in scar. She also has history of perianal and vaginal abscesses for which she has had multiple I/Ds with her colorectal surgeon. Her case was previously presented at IBD multidisciplinary conference, at which point she was formally diagnosed with fistulizing Crohn's disease, initially started on IFX but due to insurance had lapse in infusion and re-loaded with IFX on 6/24/24 who has now completed induction.     Fistulizing Crohn's Disease: -diagnosed after multi-disciplinary conference in 2023, currently she has completed her induction dosing of IFX, and is pending first maintenance trial, however recent labs on 8/7/24 indicate ATI and no IFX levels and her symptoms of arthralgia are concerning for antibody deposition (+ATI) and thus will start MTX 25mg PO weekly with folic acid supplementation with hopes of decreasing ATI  -continue IFX 10mg/kg q8 weeks -discussed with patient that eventually will need to evaluate disease endoscopically but want to ensure on stable dosing of IFX prior to endoscopy to truly assess mucosal healing -for perianal fistula drainage - discussed with patient that if were to develop drainage can start metronidazole 250mg daily for 1 week and assess response and if there is good response take week of and restart -For MTX - Infrequently reported side effects include flu-like symptoms (nausea, vomiting, headache, fatigue, and diarrhea), mouth sores, and low white blood cell count. Less common side effects include scarring of the liver and lung inflammation. Some of the side effects can be prevented by adding folic acid (a vitamin).  Patient has no plans for pregnancy, will continue to monitor with blood work at next visit -discussed care with patient's wife, Dr Rios, with patients permission -will obtain TMPT in case patient were to require 6-MP/AZA  Microcytic Anemia -suspect patient is iron deficient given menses and decrease ability to absorb iron given gastric sleeve, thus will obtain iron-panel and refer to heme for consideration of parenteral iron  IBD HCM -encouraged patient to quit tobacco smoking   Hx of Gastric Sleeve -continue GERD mgmt PRN -obtain folic acid, B12, Vit-D  RTC: 1 month  MD Jay Hunt IBD Fellow [facility]   Discussed with Dr Shepherd

## 2024-08-21 NOTE — PHYSICAL EXAM
[Abdomen Tenderness] : non-tender [Normal] : oriented to person, place, and time [de-identified] : stoma is pink and ostomoy bag with brown stool and skin around ostomy is c/d/i

## 2024-08-21 NOTE — ASSESSMENT
[FreeTextEntry1] : Ms. MCLEOD is a 38 yeary/o with MHx of gastric sleeve, anemia,  fistulizing Crohn's disease, whose wife is a physician (med-peds, Dr Rios) she developed large ovarian abscess in 2019, which required drain and cyst removal, and was found to be benign. 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 Went for planned fistula and ostomy takedown 1/26/23, however only underwent lysis of adhesions as was unable to identify colon in scar. She also has history of perianal and vaginal abscesses for which she has had multiple I/Ds with her colorectal surgeon. Her case was previously presented at IBD multidisciplinary conference, at which point she was formally diagnosed with fistulizing Crohn's disease, initially started on IFX but due to insurance had lapse in infusion and re-loaded with IFX on 6/24/24 who has now completed induction.     Fistulizing Crohn's Disease: -diagnosed after multi-disciplinary conference in 2023, currently she has completed her induction dosing of IFX, and is pending first maintenance trial, however recent labs on 8/7/24 indicate ATI and no IFX levels and her symptoms of arthralgia are concerning for antibody deposition (+ATI) and thus will start MTX 25mg PO weekly with folic acid supplementation with hopes of decreasing ATI  -continue IFX 10mg/kg q8 weeks -discussed with patient that eventually will need to evaluate disease endoscopically but want to ensure on stable dosing of IFX prior to endoscopy to truly assess mucosal healing -for perianal fistula drainage - discussed with patient that if were to develop drainage can start metronidazole 250mg daily for 1 week and assess response and if there is good response take week of and restart -For MTX - Infrequently reported side effects include flu-like symptoms (nausea, vomiting, headache, fatigue, and diarrhea), mouth sores, and low white blood cell count. Less common side effects include scarring of the liver and lung inflammation. Some of the side effects can be prevented by adding folic acid (a vitamin).  Patient has no plans for pregnancy, will continue to monitor with blood work at next visit -discussed care with patient's wife, Dr Rios, with patients permission -will obtain TMPT in case patient were to require 6-MP/AZA  Microcytic Anemia -suspect patient is iron deficient given menses and decrease ability to absorb iron given gastric sleeve, thus will obtain iron-panel and refer to heme for consideration of parenteral iron  IBD HCM -encouraged patient to quit tobacco smoking   Hx of Gastric Sleeve -continue GERD mgmt PRN -obtain folic acid, B12, Vit-D  RTC: 1 month  MD Jay Hunt IBD Fellow [facility]   Discussed with Dr Shepherd

## 2024-08-22 RX ORDER — ERGOCALCIFEROL 1.25 MG/1
1.25 MG CAPSULE, LIQUID FILLED ORAL
Qty: 13 | Refills: 3 | Status: ACTIVE | COMMUNITY
Start: 2024-08-22 | End: 1900-01-01

## 2024-08-22 RX ORDER — MECOBALAMIN 1000 MCG
1000 TABLET,DISINTEGRATING SUBLINGUAL DAILY
Qty: 30 | Refills: 3 | Status: ACTIVE | COMMUNITY
Start: 2024-08-22 | End: 1900-01-01

## 2024-08-29 RX ORDER — METHOTREXATE 2.5 MG/1
2.5 TABLET ORAL
Qty: 43 | Refills: 3 | Status: ACTIVE | COMMUNITY
Start: 2024-08-19 | End: 1900-01-01

## 2024-10-09 ENCOUNTER — APPOINTMENT (OUTPATIENT)
Dept: RHEUMATOLOGY | Facility: CLINIC | Age: 39
End: 2024-10-09
Payer: COMMERCIAL

## 2024-10-09 VITALS
HEART RATE: 92 BPM | TEMPERATURE: 97.7 F | OXYGEN SATURATION: 99 % | RESPIRATION RATE: 15 BRPM | SYSTOLIC BLOOD PRESSURE: 127 MMHG | DIASTOLIC BLOOD PRESSURE: 79 MMHG

## 2024-10-09 DIAGNOSIS — K50.10 CROHN'S DISEASE OF LARGE INTESTINE W/OUT COMPLICATIONS: ICD-10-CM

## 2024-10-09 PROCEDURE — 96415 CHEMO IV INFUSION ADDL HR: CPT

## 2024-10-09 PROCEDURE — 96413 CHEMO IV INFUSION 1 HR: CPT

## 2024-10-09 PROCEDURE — 36415 COLL VENOUS BLD VENIPUNCTURE: CPT

## 2024-10-10 LAB
ALBUMIN SERPL ELPH-MCNC: 3.9 G/DL
ALP BLD-CCNC: 93 U/L
ALT SERPL-CCNC: 5 U/L
ANION GAP SERPL CALC-SCNC: 12 MMOL/L
AST SERPL-CCNC: 13 U/L
BASOPHILS # BLD AUTO: 0.02 K/UL
BASOPHILS NFR BLD AUTO: 0.2 %
BILIRUB SERPL-MCNC: 0.3 MG/DL
BUN SERPL-MCNC: 7 MG/DL
CALCIUM SERPL-MCNC: 9.3 MG/DL
CHLORIDE SERPL-SCNC: 101 MMOL/L
CO2 SERPL-SCNC: 26 MMOL/L
CREAT SERPL-MCNC: 0.61 MG/DL
CRP SERPL-MCNC: 11 MG/L
EGFR: 118 ML/MIN/1.73M2
EOSINOPHIL # BLD AUTO: 0.25 K/UL
EOSINOPHIL NFR BLD AUTO: 2.6 %
GLUCOSE SERPL-MCNC: 97 MG/DL
HCT VFR BLD CALC: 36.6 %
HGB BLD-MCNC: 10.4 G/DL
IMM GRANULOCYTES NFR BLD AUTO: 0.7 %
LYMPHOCYTES # BLD AUTO: 0.95 K/UL
LYMPHOCYTES NFR BLD AUTO: 10 %
MAN DIFF?: NORMAL
MCHC RBC-ENTMCNC: 20.6 PG
MCHC RBC-ENTMCNC: 28.4 GM/DL
MCV RBC AUTO: 72.3 FL
MONOCYTES # BLD AUTO: 0.6 K/UL
MONOCYTES NFR BLD AUTO: 6.3 %
NEUTROPHILS # BLD AUTO: 7.61 K/UL
NEUTROPHILS NFR BLD AUTO: 80.2 %
PLATELET # BLD AUTO: 455 K/UL
POTASSIUM SERPL-SCNC: 4.8 MMOL/L
PROT SERPL-MCNC: 8 G/DL
RBC # BLD: 5.06 M/UL
RBC # FLD: 20 %
SODIUM SERPL-SCNC: 139 MMOL/L
WBC # FLD AUTO: 9.5 K/UL

## 2024-10-15 LAB
ANTIBODIES TO INFLIXIMAB (ATI) CONCENRTRATION: 15.4 U/ML
PROMETHEUS ANSER IFX: NORMAL
PROMETHEUS LABORATORY FOOTER: NORMAL
SERUM INFLIXIMAB (IFX) CONCENTRATION: < 1 UG/ML

## 2024-11-06 ENCOUNTER — OUTPATIENT (OUTPATIENT)
Dept: OUTPATIENT SERVICES | Facility: HOSPITAL | Age: 39
LOS: 1 days | Discharge: HOME | End: 2024-11-06

## 2024-11-06 DIAGNOSIS — Z98.890 OTHER SPECIFIED POSTPROCEDURAL STATES: Chronic | ICD-10-CM

## 2024-11-06 DIAGNOSIS — D50.9 IRON DEFICIENCY ANEMIA, UNSPECIFIED: ICD-10-CM

## 2024-11-06 DIAGNOSIS — Z90.3 ACQUIRED ABSENCE OF STOMACH [PART OF]: Chronic | ICD-10-CM

## 2024-11-11 ENCOUNTER — APPOINTMENT (OUTPATIENT)
Dept: GASTROENTEROLOGY | Facility: CLINIC | Age: 39
End: 2024-11-11

## 2024-11-13 ENCOUNTER — NON-APPOINTMENT (OUTPATIENT)
Age: 39
End: 2024-11-13

## 2024-11-14 ENCOUNTER — APPOINTMENT (OUTPATIENT)
Dept: HEMATOLOGY ONCOLOGY | Facility: CLINIC | Age: 39
End: 2024-11-14
Payer: COMMERCIAL

## 2024-11-14 ENCOUNTER — RESULT REVIEW (OUTPATIENT)
Age: 39
End: 2024-11-14

## 2024-11-14 VITALS
SYSTOLIC BLOOD PRESSURE: 146 MMHG | HEIGHT: 63 IN | DIASTOLIC BLOOD PRESSURE: 81 MMHG | HEART RATE: 96 BPM | BODY MASS INDEX: 30.68 KG/M2 | OXYGEN SATURATION: 100 % | TEMPERATURE: 97.6 F | WEIGHT: 173.17 LBS

## 2024-11-14 DIAGNOSIS — Z98.84 BARIATRIC SURGERY STATUS: ICD-10-CM

## 2024-11-14 DIAGNOSIS — D50.9 IRON DEFICIENCY ANEMIA, UNSPECIFIED: ICD-10-CM

## 2024-11-14 DIAGNOSIS — K50.10 CROHN'S DISEASE OF LARGE INTESTINE W/OUT COMPLICATIONS: ICD-10-CM

## 2024-11-14 LAB
BASOPHILS # BLD AUTO: 0.1 K/UL — SIGNIFICANT CHANGE UP (ref 0–0.2)
BASOPHILS NFR BLD AUTO: 0.7 % — SIGNIFICANT CHANGE UP (ref 0–2)
EOSINOPHIL # BLD AUTO: 0.1 K/UL — SIGNIFICANT CHANGE UP (ref 0–0.5)
EOSINOPHIL NFR BLD AUTO: 1 % — SIGNIFICANT CHANGE UP (ref 0–6)
HCT VFR BLD CALC: 35.1 % — SIGNIFICANT CHANGE UP (ref 34.5–45)
HGB BLD-MCNC: 10.7 G/DL — LOW (ref 11.5–15.5)
LYMPHOCYTES # BLD AUTO: 1 K/UL — SIGNIFICANT CHANGE UP (ref 1–3.3)
LYMPHOCYTES # BLD AUTO: 9.9 % — LOW (ref 13–44)
MCHC RBC-ENTMCNC: 21.2 PG — LOW (ref 27–34)
MCHC RBC-ENTMCNC: 30.4 G/DL — LOW (ref 32–36)
MCV RBC AUTO: 69.6 FL — LOW (ref 80–100)
MONOCYTES # BLD AUTO: 0.4 K/UL — SIGNIFICANT CHANGE UP (ref 0–0.9)
MONOCYTES NFR BLD AUTO: 4.2 % — SIGNIFICANT CHANGE UP (ref 2–14)
NEUTROPHILS # BLD AUTO: 8.6 K/UL — HIGH (ref 1.8–7.4)
NEUTROPHILS NFR BLD AUTO: 84.2 % — HIGH (ref 43–77)
PLATELET # BLD AUTO: 388 K/UL — SIGNIFICANT CHANGE UP (ref 150–400)
RBC # BLD: 5.04 M/UL — SIGNIFICANT CHANGE UP (ref 3.8–5.2)
RBC # FLD: 17 % — HIGH (ref 10.3–14.5)
WBC # BLD: 10.2 K/UL — SIGNIFICANT CHANGE UP (ref 3.8–10.5)
WBC # FLD AUTO: 10.2 K/UL — SIGNIFICANT CHANGE UP (ref 3.8–10.5)

## 2024-11-14 PROCEDURE — 99203 OFFICE O/P NEW LOW 30 MIN: CPT

## 2024-11-15 LAB
ALBUMIN SERPL ELPH-MCNC: 4 G/DL
ALP BLD-CCNC: 83 U/L
ALT SERPL-CCNC: 8 U/L
ANION GAP SERPL CALC-SCNC: 15 MMOL/L
AST SERPL-CCNC: 13 U/L
BILIRUB SERPL-MCNC: 0.4 MG/DL
BUN SERPL-MCNC: 5 MG/DL
CALCIUM SERPL-MCNC: 8.8 MG/DL
CHLORIDE SERPL-SCNC: 100 MMOL/L
CO2 SERPL-SCNC: 23 MMOL/L
CREAT SERPL-MCNC: 0.56 MG/DL
EGFR: 120 ML/MIN/1.73M2
FERRITIN SERPL-MCNC: 6 NG/ML
FOLATE SERPL-MCNC: 14.8 NG/ML
GLUCOSE SERPL-MCNC: 86 MG/DL
IRON SATN MFR SERPL: 3 %
IRON SERPL-MCNC: 13 UG/DL
POTASSIUM SERPL-SCNC: 4.2 MMOL/L
PROT SERPL-MCNC: 8.3 G/DL
RBC # BLD: 4.78 M/UL — SIGNIFICANT CHANGE UP (ref 3.8–5.2)
RETICS #: 64.1 K/UL — SIGNIFICANT CHANGE UP (ref 25–125)
RETICS/RBC NFR: 1.3 % — SIGNIFICANT CHANGE UP (ref 0.5–2.5)
SODIUM SERPL-SCNC: 138 MMOL/L
TIBC SERPL-MCNC: 448 UG/DL
UIBC SERPL-MCNC: 435 UG/DL
VIT B12 SERPL-MCNC: 423 PG/ML

## 2024-12-02 ENCOUNTER — APPOINTMENT (OUTPATIENT)
Age: 39
End: 2024-12-02

## 2024-12-09 ENCOUNTER — APPOINTMENT (OUTPATIENT)
Age: 39
End: 2024-12-09

## 2024-12-12 ENCOUNTER — APPOINTMENT (OUTPATIENT)
Dept: RHEUMATOLOGY | Facility: CLINIC | Age: 39
End: 2024-12-12
Payer: COMMERCIAL

## 2024-12-12 VITALS
OXYGEN SATURATION: 100 % | HEART RATE: 80 BPM | DIASTOLIC BLOOD PRESSURE: 80 MMHG | SYSTOLIC BLOOD PRESSURE: 146 MMHG | RESPIRATION RATE: 15 BRPM

## 2024-12-12 VITALS
SYSTOLIC BLOOD PRESSURE: 121 MMHG | RESPIRATION RATE: 15 BRPM | DIASTOLIC BLOOD PRESSURE: 80 MMHG | OXYGEN SATURATION: 100 % | HEART RATE: 83 BPM | TEMPERATURE: 98.2 F

## 2024-12-12 DIAGNOSIS — K50.10 CROHN'S DISEASE OF LARGE INTESTINE W/OUT COMPLICATIONS: ICD-10-CM

## 2024-12-12 PROCEDURE — 36415 COLL VENOUS BLD VENIPUNCTURE: CPT

## 2024-12-12 PROCEDURE — 96413 CHEMO IV INFUSION 1 HR: CPT

## 2024-12-12 PROCEDURE — 96415 CHEMO IV INFUSION ADDL HR: CPT

## 2024-12-13 LAB
ALBUMIN SERPL ELPH-MCNC: 3.8 G/DL
ALP BLD-CCNC: 86 U/L
ALT SERPL-CCNC: 6 U/L
ANION GAP SERPL CALC-SCNC: 13 MMOL/L
AST SERPL-CCNC: 26 U/L
BASOPHILS # BLD AUTO: 0.03 K/UL
BASOPHILS NFR BLD AUTO: 0.3 %
BILIRUB SERPL-MCNC: 0.2 MG/DL
BUN SERPL-MCNC: 7 MG/DL
CALCIUM SERPL-MCNC: 9.1 MG/DL
CHLORIDE SERPL-SCNC: 102 MMOL/L
CO2 SERPL-SCNC: 25 MMOL/L
CREAT SERPL-MCNC: 0.7 MG/DL
CRP SERPL-MCNC: 23 MG/L
EGFR: 113 ML/MIN/1.73M2
EOSINOPHIL # BLD AUTO: 0.28 K/UL
EOSINOPHIL NFR BLD AUTO: 3.1 %
GLUCOSE SERPL-MCNC: 76 MG/DL
HCT VFR BLD CALC: 34 %
HGB BLD-MCNC: 9.5 G/DL
IMM GRANULOCYTES NFR BLD AUTO: 0.6 %
LYMPHOCYTES # BLD AUTO: 1 K/UL
LYMPHOCYTES NFR BLD AUTO: 11.1 %
MAN DIFF?: NORMAL
MCHC RBC-ENTMCNC: 20.3 PG
MCHC RBC-ENTMCNC: 27.9 G/DL
MCV RBC AUTO: 72.6 FL
MONOCYTES # BLD AUTO: 0.7 K/UL
MONOCYTES NFR BLD AUTO: 7.8 %
NEUTROPHILS # BLD AUTO: 6.97 K/UL
NEUTROPHILS NFR BLD AUTO: 77.1 %
PLATELET # BLD AUTO: 373 K/UL
POTASSIUM SERPL-SCNC: 4.8 MMOL/L
PROT SERPL-MCNC: 8.1 G/DL
RBC # BLD: 4.68 M/UL
RBC # FLD: 18.8 %
SODIUM SERPL-SCNC: 140 MMOL/L
WBC # FLD AUTO: 9.03 K/UL

## 2024-12-16 ENCOUNTER — APPOINTMENT (OUTPATIENT)
Age: 39
End: 2024-12-16

## 2025-01-02 DIAGNOSIS — Z51.81 ENCOUNTER FOR THERAPEUTIC DRUG LVL MONITORING: ICD-10-CM

## 2025-01-03 ENCOUNTER — TRANSCRIPTION ENCOUNTER (OUTPATIENT)
Age: 40
End: 2025-01-03

## 2025-01-06 ENCOUNTER — OUTPATIENT (OUTPATIENT)
Dept: OUTPATIENT SERVICES | Facility: HOSPITAL | Age: 40
LOS: 1 days | Discharge: HOME | End: 2025-01-06

## 2025-01-06 DIAGNOSIS — Z98.890 OTHER SPECIFIED POSTPROCEDURAL STATES: Chronic | ICD-10-CM

## 2025-01-06 DIAGNOSIS — D50.9 IRON DEFICIENCY ANEMIA, UNSPECIFIED: ICD-10-CM

## 2025-01-06 DIAGNOSIS — Z90.3 ACQUIRED ABSENCE OF STOMACH [PART OF]: Chronic | ICD-10-CM

## 2025-01-07 ENCOUNTER — APPOINTMENT (OUTPATIENT)
Dept: HEMATOLOGY ONCOLOGY | Facility: CLINIC | Age: 40
End: 2025-01-07

## 2025-01-21 ENCOUNTER — NON-APPOINTMENT (OUTPATIENT)
Age: 40
End: 2025-01-21

## 2025-01-29 ENCOUNTER — LABORATORY RESULT (OUTPATIENT)
Age: 40
End: 2025-01-29

## 2025-01-29 ENCOUNTER — APPOINTMENT (OUTPATIENT)
Dept: GASTROENTEROLOGY | Facility: CLINIC | Age: 40
End: 2025-01-29
Payer: COMMERCIAL

## 2025-01-29 VITALS
HEART RATE: 95 BPM | HEIGHT: 63 IN | WEIGHT: 165 LBS | TEMPERATURE: 98 F | DIASTOLIC BLOOD PRESSURE: 74 MMHG | BODY MASS INDEX: 29.23 KG/M2 | SYSTOLIC BLOOD PRESSURE: 136 MMHG | OXYGEN SATURATION: 98 %

## 2025-01-29 DIAGNOSIS — K21.9 GASTRO-ESOPHAGEAL REFLUX DISEASE W/OUT ESOPHAGITIS: ICD-10-CM

## 2025-01-29 PROCEDURE — 99215 OFFICE O/P EST HI 40 MIN: CPT

## 2025-01-29 RX ORDER — OMEPRAZOLE 40 MG/1
40 CAPSULE, DELAYED RELEASE ORAL DAILY
Qty: 30 | Refills: 3 | Status: ACTIVE | COMMUNITY
Start: 2025-01-29 | End: 1900-01-01

## 2025-01-29 RX ORDER — SERTRALINE HYDROCHLORIDE 25 MG/1
TABLET, FILM COATED ORAL
Refills: 0 | Status: ACTIVE | COMMUNITY

## 2025-01-30 ENCOUNTER — TRANSCRIPTION ENCOUNTER (OUTPATIENT)
Age: 40
End: 2025-01-30

## 2025-01-30 LAB
25(OH)D3 SERPL-MCNC: 8.7 NG/ML
FOLATE SERPL-MCNC: >20 NG/ML
PHOSPHATE SERPL-MCNC: 3.8 MG/DL
VIT B12 SERPL-MCNC: 480 PG/ML

## 2025-02-02 LAB — ZINC SERPL-MCNC: 45 UG/DL

## 2025-02-04 LAB — COPPER SERPL-MCNC: 187 UG/DL

## 2025-02-06 ENCOUNTER — NON-APPOINTMENT (OUTPATIENT)
Age: 40
End: 2025-02-06

## 2025-02-13 ENCOUNTER — APPOINTMENT (OUTPATIENT)
Dept: RHEUMATOLOGY | Facility: CLINIC | Age: 40
End: 2025-02-13

## 2025-02-13 VITALS
RESPIRATION RATE: 15 BRPM | OXYGEN SATURATION: 99 % | DIASTOLIC BLOOD PRESSURE: 86 MMHG | TEMPERATURE: 98.2 F | HEART RATE: 91 BPM | SYSTOLIC BLOOD PRESSURE: 130 MMHG

## 2025-02-13 DIAGNOSIS — K50.10 CROHN'S DISEASE OF LARGE INTESTINE W/OUT COMPLICATIONS: ICD-10-CM

## 2025-02-13 PROCEDURE — 96415 CHEMO IV INFUSION ADDL HR: CPT

## 2025-02-13 PROCEDURE — 96413 CHEMO IV INFUSION 1 HR: CPT

## 2025-02-13 PROCEDURE — 36415 COLL VENOUS BLD VENIPUNCTURE: CPT

## 2025-02-14 LAB
ALBUMIN SERPL ELPH-MCNC: 3.6 G/DL
ALP BLD-CCNC: 96 U/L
ALT SERPL-CCNC: 9 U/L
ANION GAP SERPL CALC-SCNC: 12 MMOL/L
AST SERPL-CCNC: 13 U/L
BASOPHILS # BLD AUTO: 0.03 K/UL
BASOPHILS NFR BLD AUTO: 0.3 %
BILIRUB SERPL-MCNC: 0.2 MG/DL
BUN SERPL-MCNC: 5 MG/DL
CALCIUM SERPL-MCNC: 9.2 MG/DL
CHLORIDE SERPL-SCNC: 97 MMOL/L
CO2 SERPL-SCNC: 27 MMOL/L
CREAT SERPL-MCNC: 0.59 MG/DL
CRP SERPL-MCNC: 31 MG/L
EGFR: 118 ML/MIN/1.73M2
EOSINOPHIL # BLD AUTO: 0.39 K/UL
EOSINOPHIL NFR BLD AUTO: 4.1 %
GLUCOSE SERPL-MCNC: 92 MG/DL
HCT VFR BLD CALC: 43.2 %
HGB BLD-MCNC: 13.2 G/DL
IMM GRANULOCYTES NFR BLD AUTO: 0.2 %
LYMPHOCYTES # BLD AUTO: 1.02 K/UL
LYMPHOCYTES NFR BLD AUTO: 10.6 %
MAN DIFF?: NORMAL
MCHC RBC-ENTMCNC: 25.6 PG
MCHC RBC-ENTMCNC: 30.6 G/DL
MCV RBC AUTO: 83.9 FL
MONOCYTES # BLD AUTO: 0.59 K/UL
MONOCYTES NFR BLD AUTO: 6.1 %
NEUTROPHILS # BLD AUTO: 7.56 K/UL
NEUTROPHILS NFR BLD AUTO: 78.7 %
PLATELET # BLD AUTO: 378 K/UL
POTASSIUM SERPL-SCNC: 4.2 MMOL/L
PROT SERPL-MCNC: 7.8 G/DL
RBC # BLD: 5.15 M/UL
RBC # FLD: 18.6 %
SODIUM SERPL-SCNC: 137 MMOL/L
WBC # FLD AUTO: 9.61 K/UL

## 2025-03-03 NOTE — REASON FOR VISIT
[Post Operative Visit] : a post operative visit for [S/P Bariatric Surgery] : s/p bariatric surgery Home

## 2025-03-04 ENCOUNTER — NON-APPOINTMENT (OUTPATIENT)
Age: 40
End: 2025-03-04

## 2025-03-07 ENCOUNTER — APPOINTMENT (OUTPATIENT)
Dept: GASTROENTEROLOGY | Facility: CLINIC | Age: 40
End: 2025-03-07

## 2025-03-17 ENCOUNTER — RX RENEWAL (OUTPATIENT)
Age: 40
End: 2025-03-17

## 2025-04-14 ENCOUNTER — APPOINTMENT (OUTPATIENT)
Dept: RHEUMATOLOGY | Facility: CLINIC | Age: 40
End: 2025-04-14
Payer: COMMERCIAL

## 2025-04-14 VITALS
OXYGEN SATURATION: 98 % | SYSTOLIC BLOOD PRESSURE: 131 MMHG | DIASTOLIC BLOOD PRESSURE: 77 MMHG | TEMPERATURE: 98.3 F | HEART RATE: 73 BPM | RESPIRATION RATE: 15 BRPM

## 2025-04-14 VITALS
OXYGEN SATURATION: 99 % | DIASTOLIC BLOOD PRESSURE: 70 MMHG | RESPIRATION RATE: 15 BRPM | HEART RATE: 81 BPM | SYSTOLIC BLOOD PRESSURE: 132 MMHG | TEMPERATURE: 98.2 F

## 2025-04-14 DIAGNOSIS — K50.10 CROHN'S DISEASE OF LARGE INTESTINE W/OUT COMPLICATIONS: ICD-10-CM

## 2025-04-14 PROCEDURE — 36415 COLL VENOUS BLD VENIPUNCTURE: CPT

## 2025-04-14 PROCEDURE — 96413 CHEMO IV INFUSION 1 HR: CPT

## 2025-04-14 PROCEDURE — 96415 CHEMO IV INFUSION ADDL HR: CPT

## 2025-04-14 RX ORDER — INFLIXIMAB-DYYB 100 MG/10ML
100 INJECTION, POWDER, LYOPHILIZED, FOR SOLUTION INTRAVENOUS
Qty: 0 | Refills: 0 | Status: COMPLETED | OUTPATIENT
Start: 2025-04-10

## 2025-04-15 LAB
ALBUMIN SERPL ELPH-MCNC: 3.9 G/DL
ALP BLD-CCNC: 93 U/L
ALT SERPL-CCNC: 9 U/L
ANION GAP SERPL CALC-SCNC: 10 MMOL/L
AST SERPL-CCNC: 13 U/L
BASOPHILS # BLD AUTO: 0.04 K/UL
BASOPHILS NFR BLD AUTO: 0.3 %
BILIRUB SERPL-MCNC: 0.3 MG/DL
BUN SERPL-MCNC: 12 MG/DL
CALCIUM SERPL-MCNC: 9.4 MG/DL
CHLORIDE SERPL-SCNC: 99 MMOL/L
CO2 SERPL-SCNC: 29 MMOL/L
CREAT SERPL-MCNC: 0.79 MG/DL
CRP SERPL-MCNC: 35 MG/L
EGFRCR SERPLBLD CKD-EPI 2021: 98 ML/MIN/1.73M2
EOSINOPHIL # BLD AUTO: 0.47 K/UL
EOSINOPHIL NFR BLD AUTO: 3.7 %
GLUCOSE SERPL-MCNC: 96 MG/DL
HCT VFR BLD CALC: 41.6 %
HGB BLD-MCNC: 12.5 G/DL
IMM GRANULOCYTES NFR BLD AUTO: 0.5 %
LYMPHOCYTES # BLD AUTO: 1.23 K/UL
LYMPHOCYTES NFR BLD AUTO: 9.6 %
MAN DIFF?: NORMAL
MCHC RBC-ENTMCNC: 26.9 PG
MCHC RBC-ENTMCNC: 30 G/DL
MCV RBC AUTO: 89.5 FL
MONOCYTES # BLD AUTO: 0.82 K/UL
MONOCYTES NFR BLD AUTO: 6.4 %
NEUTROPHILS # BLD AUTO: 10.22 K/UL
NEUTROPHILS NFR BLD AUTO: 79.5 %
PLATELET # BLD AUTO: 436 K/UL
POTASSIUM SERPL-SCNC: 5.2 MMOL/L
PROT SERPL-MCNC: 7.8 G/DL
RBC # BLD: 4.65 M/UL
RBC # FLD: 14.1 %
SODIUM SERPL-SCNC: 138 MMOL/L
WBC # FLD AUTO: 12.85 K/UL

## 2025-04-25 ENCOUNTER — TRANSCRIPTION ENCOUNTER (OUTPATIENT)
Age: 40
End: 2025-04-25

## 2025-04-29 ENCOUNTER — APPOINTMENT (OUTPATIENT)
Dept: GASTROENTEROLOGY | Facility: HOSPITAL | Age: 40
End: 2025-04-29

## 2025-06-16 ENCOUNTER — APPOINTMENT (OUTPATIENT)
Dept: RHEUMATOLOGY | Facility: CLINIC | Age: 40
End: 2025-06-16

## 2025-06-17 ENCOUNTER — APPOINTMENT (OUTPATIENT)
Dept: GASTROENTEROLOGY | Facility: CLINIC | Age: 40
End: 2025-06-17

## 2025-06-26 ENCOUNTER — RX RENEWAL (OUTPATIENT)
Age: 40
End: 2025-06-26

## 2025-09-06 ENCOUNTER — RX RENEWAL (OUTPATIENT)
Age: 40
End: 2025-09-06

## (undated) DEVICE — Device

## (undated) DEVICE — TUBING RANGER FLUID IRRIGATION SET DISP

## (undated) DEVICE — LONE STAR RETRACTOR SQUARE 14.1CMX14.1CM DISP

## (undated) DEVICE — DRAPE TOWEL BLUE 17" X 24"

## (undated) DEVICE — PREP CHLORAPREP HI-LITE ORANGE 26ML

## (undated) DEVICE — LIGASURE MARYLAND 37CM

## (undated) DEVICE — KIT ENDO PROCEDURE CUST W/VLV

## (undated) DEVICE — SUT CHROMIC 3-0 27" SH

## (undated) DEVICE — PACK CYSTO

## (undated) DEVICE — STAPLER SKIN PROXIMATE

## (undated) DEVICE — WARMING BLANKET UPPER ADULT

## (undated) DEVICE — SUT VICRYL 2-0 18" TIES

## (undated) DEVICE — GLV 7.5 PROTEXIS (WHITE)

## (undated) DEVICE — LUBRICATING JELLY ONESHOT 1.25OZ

## (undated) DEVICE — DRSG 4 X 8

## (undated) DEVICE — FOLEY TRAY 16FR 5CC LF UMETER CLOSED

## (undated) DEVICE — GLV 8 PROTEXIS (WHITE)

## (undated) DEVICE — SPONGE ENDO PEANUT 5MM

## (undated) DEVICE — SUT PROLENE 2-0 36" SH

## (undated) DEVICE — SUCTION YANKAUER NO CONTROL VENT

## (undated) DEVICE — GLV 9 PROTEXIS (WHITE)

## (undated) DEVICE — PACK GENERAL MINOR

## (undated) DEVICE — SLV COMPRESSION KNEE MED

## (undated) DEVICE — SYR LUER LOK 30CC

## (undated) DEVICE — SUT VICRYL 3-0 27" SH

## (undated) DEVICE — STAPLER COVIDIEN ENDO GIA SHORT HANDLE

## (undated) DEVICE — VESSEL LOOP MAXI-BLUE 0.120" X 16"

## (undated) DEVICE — POOLE SUCTION TIP

## (undated) DEVICE — TAPE SILK 3"

## (undated) DEVICE — DRSG TEGADERM 4X4.75"

## (undated) DEVICE — DRAPE 1/2 SHEET 40X57"

## (undated) DEVICE — DRAPE 3/4 SHEET 52X76"

## (undated) DEVICE — BLADE SURGICAL #15 CARBON

## (undated) DEVICE — NDL HYPO SAFE 25G X 1.5" (ORANGE)

## (undated) DEVICE — PREP BETADINE SPONGE STICKS

## (undated) DEVICE — SOL IRR BAG NS 0.9% 3000ML

## (undated) DEVICE — LIGASURE IMPACT